# Patient Record
Sex: FEMALE | Race: WHITE
[De-identification: names, ages, dates, MRNs, and addresses within clinical notes are randomized per-mention and may not be internally consistent; named-entity substitution may affect disease eponyms.]

---

## 2020-01-08 ENCOUNTER — HOSPITAL ENCOUNTER (EMERGENCY)
Dept: HOSPITAL 46 - ED | Age: 45
Discharge: HOME | End: 2020-01-08
Payer: MEDICARE

## 2020-01-08 VITALS — WEIGHT: 230.01 LBS | BODY MASS INDEX: 38.32 KG/M2 | HEIGHT: 65 IN

## 2020-01-08 DIAGNOSIS — S29.011A: Primary | ICD-10-CM
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
## 2020-01-08 DIAGNOSIS — Z79.899: ICD-10-CM

## 2020-01-09 NOTE — EKG
[facility]
                                    [address]
                                  [place], [place]  [address]
_________________________________________________________________________________________
                                                                 Signed   
 
 
Normal sinus rhythm
Nonspecific ST and T wave abnormality
Abnormal ECG
No previous ECGs available
Confirmed by DEJUAN LLANES MD ([id number]) on 1/9/2020 7:12:01 AM
 
 
 
 
 
 
 
 
 
 
 
 
 
 
 
 
 
 
 
 
 
 
 
 
 
 
 
 
 
 
 
 
 
 
 
 
 
 
 
 
    Electronically Signed By: DEJUAN LLANES MD  01/09/20 0712
_________________________________________________________________________________________
PATIENT NAME:     RASHI PEREZ                     
MEDICAL RECORD #: [id number]                     Electrocardiogram             
          ACCT #: [id number]  
DATE OF BIRTH:   [birth year]                                       
PHYSICIAN:   DEJUAN LLANES MD                   REPORT #: [id number]
REPORT IS CONFIDENTIAL AND NOT TO BE RELEASED WITHOUT AUTHORIZATION

## 2020-09-28 ENCOUNTER — HOSPITAL ENCOUNTER (EMERGENCY)
Dept: HOSPITAL 46 - ED | Age: 45
Discharge: HOME | End: 2020-09-28
Payer: MEDICARE

## 2020-09-28 VITALS — HEIGHT: 65 IN | BODY MASS INDEX: 38.32 KG/M2 | WEIGHT: 230.01 LBS

## 2020-09-28 DIAGNOSIS — F17.200: ICD-10-CM

## 2020-09-28 DIAGNOSIS — E66.9: ICD-10-CM

## 2020-09-28 DIAGNOSIS — Z79.899: ICD-10-CM

## 2020-09-28 DIAGNOSIS — R00.2: Primary | ICD-10-CM

## 2020-09-28 NOTE — XMS
Encounter Summary
  Created on: 2020
 
 Marie Fermin
 External Reference #: [id number]
 : [birth year]
 Sex: Female
 
 Demographics
 
 
+-----------------------+------------------------+
| Address               | [address]          |
|                       | [place]  [address]      |
+-----------------------+------------------------+
| Home Phone            | [phone]        |
+-----------------------+------------------------+
| Preferred Language    | Unknown                |
+-----------------------+------------------------+
| Marital Status        |                 |
+-----------------------+------------------------+
| Judaism Affiliation | Unknown                |
+-----------------------+------------------------+
| Race                  | White                  |
+-----------------------+------------------------+
| Ethnic Group          | Not  or  |
+-----------------------+------------------------+
 
 
 Author
 
 
+--------------+--------------------------------------------+
| Author       | [facility]  |
|              | and [place]                                |
+--------------+--------------------------------------------+
| Organization | [facility]  |
|              | and [place]                                |
+--------------+--------------------------------------------+
| Address      | Unknown                                    |
+--------------+--------------------------------------------+
| Phone        | Unavailable                                |
+--------------+--------------------------------------------+
 
 
 
 Support
 
 
+-----------------+--------------+---------+-----------------+
| Name            | Relationship | Address | Phone           |
+-----------------+--------------+---------+-----------------+
| Rodríguez Fermin | ECON         | Unknown | [phone] |
+-----------------+--------------+---------+-----------------+
 
 
 
 Care Team Providers
 
 
 
+-----------------------+------+-------------+
| Care Team Member Name | Role | Phone       |
+-----------------------+------+-------------+
| No, Physician         | PCP  | Unavailable |
+-----------------------+------+-------------+
 
 
 
 Reason for Referral
 Evaluate & Treat (Routine)
 
+----------+--------------+-------------+--------------+--------------+---------------+
| Status   | Reason       | Specialty   | Diagnoses /  | Referred By  | Referred To   |
|          |              |             | Procedures   | Contact      | Contact       |
+----------+--------------+-------------+--------------+--------------+---------------+
| Canceled |   Specialty  | Obstetrics  |   Diagnoses  |              |   Celi,   |
|          | Services     | and         |  Uterine     | [facility] | Emmanuel ESPINOSA MD  |
|          | Required     | Gynecology  | leiomyoma,   | , Braxton DURAN MD |  [address]  |
|          |              |             | unspecified  |   [address]  |   [address]     |
|          |              |             | location     | [address]  [place]   | [place]     |
|          |              |             |              | [place]    | [address]    |
|          |              |             |              | [address]        | Phone:        |
|          |              |             |              | Phone:       | [phone]  |
|          |              |             |              | [phone] |  Fax:         |
|          |              |             |              |   Fax:       | [phone]  |
|          |              |             |              | [phone] |               |
+----------+--------------+-------------+--------------+--------------+---------------+
 
 
 
 
 Reason for Visit
 
 
+---------+--------+----------+
| Reason  | Onset  | Comments |
|         | Date   |          |
+---------+--------+----------+
| Results | / |          |
|         |    |          |
+---------+--------+----------+
 
 
 
 Encounter Details
 
 
+--------+-----------+----------------------+----------------------+-------------+
| Date   | Type      | Department           | Care Team            | Description |
+--------+-----------+----------------------+----------------------+-------------+
| / | Telephone |   [facility]   |   Braxton Trujillo | Results     |
|    |           | CARE  [address] |  MD [address]  [address]   |             |
|        |           |   [address] [place]    | [address]  [place] |             |
|        |           | [address]           |  [address]  [phone] |             |
|        |           | [phone]         |   [phone] (Fax) |             |
+--------+-----------+----------------------+----------------------+-------------+
 
 
 
 
 Social History
 
 
+--------------------+-------+-----------+--------+------+
| Tobacco Use        | Types | Packs/Day | Years  | Date |
|                    |       |           | Used   |      |
+--------------------+-------+-----------+--------+------+
| Current Every Day  |       | 1         |        |      |
| Smoker             |       |           |        |      |
+--------------------+-------+-----------+--------+------+
 
 
 
+---------------------+---+---+---+
| Smokeless Tobacco:  |   |   |   |
| Never Used          |   |   |   |
+---------------------+---+---+---+
 
 
 
+-------------+-------------+---------+----------+
| Alcohol Use | Drinks/Week | oz/Week | Comments |
+-------------+-------------+---------+----------+
| Not Asked   |             |         |          |
+-------------+-------------+---------+----------+
 
 
 
+------------------+---------------+
| Sex Assigned at  | Date Recorded |
| Birth            |               |
+------------------+---------------+
| Not on file      |               |
+------------------+---------------+
 documented as of this encounter
 
 Miscellaneous Notes
 Addendum Note - Karina Verdugo Cert MA - 2016 11:12 AM PDT Addended by: KARINA VERDUGO
 on: 2016 11:12
 
   Modules accepted: Orders
 
   Electronically signed by Lakeisha Keith MA at 2016 11:12 AM PDTTelephone Karina Domínguez Cert MA - 2016 10:58 AM PDTPatient called back and was notified o
f note and understood the results and does accept the referral to [facility] and [facility] wait for them to call her.
 Referral will be placed.
 Electronically signed by Lakeisha Keith MA at 2016 11:00 AM PDTTelephone Karina Streeter Cert MA - 2016 10:54 AM PDTCalled patient and left message for her 
to call back.
 Electronically signed by Lakeisha Keith MA at 2016 10:55 AM PDTTelephone Braxton Hansen MD - 2016  8:38 PM PDTNotify Marie,
 Recent MRI of her back reveals deterioration of a disc in the small of her back with associ
ated arthritic changes.  This is the likely source of her discomfort.  An incidental finding
 was a large fibroid tumor on her uterus which may be causing some of her symptoms as well. 
 If she agrees, referral to the women's clinic will be made unless she has an established gy
necologist who she prefers to see elsewhere.
 She should proceed with physical therapy for her back pain.
 ERSElectronically signed by Braxton Trujillo MD at 2016  8:41 PM PDTTelephone Braxton Johnson MD - 2016  8:38 PM PDT----- Message from Gasper Escobar
 [facility] sent at 2016 14:25 PDT -----
 This has not been addressed by Dr. Jeff yet. Please adviseElectronically signed by Braxton salas MD at 2016  8:38 PM PDTdocumented in this encounter
 
 Plan of Treatment
 
 
+----------------------+-------------+--------+----------------------+---------------------+
| Name                 | Type        | Priori | Associated Diagnoses | Order Schedule      |
|                      |             | ty     |                      |                     |
+----------------------+-------------+--------+----------------------+---------------------+
| ** [facility]       | Outpatient  | Routin |   Uterine leiomyoma, | Ordered: 2016 |
| Clinic [facility]      | Referral    | e      |  unspecified         |                     |
| [facility] -  |             |        | location             |                     |
| AMB Referral         |             |        |                      |                     |
+----------------------+-------------+--------+----------------------+---------------------+
 documented as of this encounter
 
 Visit Diagnoses
 
 
+-----------------------------------------------------+
| Diagnosis                                           |
+-----------------------------------------------------+
|   Uterine leiomyoma, unspecified location - Primary |
+-----------------------------------------------------+
 documented in this encounter

## 2020-09-28 NOTE — XMS
Encounter Summary
  Created on: 2020
 
 Marie Fermin
 External Reference #: [id number]
 : [birth year]
 Sex: Female
 
 Demographics
 
 
+-----------------------+------------------------+
| Address               | [address]          |
|                       | [place]  [address]      |
+-----------------------+------------------------+
| Home Phone            | [phone]        |
+-----------------------+------------------------+
| Preferred Language    | Unknown                |
+-----------------------+------------------------+
| Marital Status        |                 |
+-----------------------+------------------------+
| Pentecostalism Affiliation | Unknown                |
+-----------------------+------------------------+
| Race                  | White                  |
+-----------------------+------------------------+
| Ethnic Group          | Not  or  |
+-----------------------+------------------------+
 
 
 Author
 
 
+--------------+--------------------------------------------+
| Author       | [facility]  |
|              | and [place]                                |
+--------------+--------------------------------------------+
| Organization | [facility]  |
|              | and [place]                                |
+--------------+--------------------------------------------+
| Address      | Unknown                                    |
+--------------+--------------------------------------------+
| Phone        | Unavailable                                |
+--------------+--------------------------------------------+
 
 
 
 Support
 
 
+-----------------+--------------+---------+-----------------+
| Name            | Relationship | Address | Phone           |
+-----------------+--------------+---------+-----------------+
| Rodríguez Fermin | ECON         | Unknown | [phone] |
+-----------------+--------------+---------+-----------------+
 
 
 
 Care Team Providers
 
 
 
+-----------------------+------+-------------+
| Care Team Member Name | Role | Phone       |
+-----------------------+------+-------------+
| No, Physician         | PCP  | Unavailable |
+-----------------------+------+-------------+
 
 
 
 Reason for Visit
 
 
+--------------+--------+----------+
| Reason       | Onset  | Comments |
|              | Date   |          |
+--------------+--------+----------+
| Imaging Only | / |          |
|              |    |          |
+--------------+--------+----------+
 
 
 
 Encounter Details
 
 
+--------+-----------+----------------------+---------------------+--------------+
| Date   | Type      | Department           | Care Team           | Description  |
+--------+-----------+----------------------+---------------------+--------------+
| / | Telephone |   [facility]   |   Yary Jeff,  | Imaging Only |
|    |           | CARE  [address] | MD  Need updated    |              |
|        |           |   [place]    | address             |              |
|        |           | [address]           |                     |              |
|        |           | [phone]         |                     |              |
+--------+-----------+----------------------+---------------------+--------------+
 
 
 
 Social History
 
 
+--------------------+-------+-----------+--------+------+
| Tobacco Use        | Types | Packs/Day | Years  | Date |
|                    |       |           | Used   |      |
+--------------------+-------+-----------+--------+------+
| Current Every Day  |       | 1         |        |      |
| Smoker             |       |           |        |      |
+--------------------+-------+-----------+--------+------+
 
 
 
+---------------------+---+---+---+
| Smokeless Tobacco:  |   |   |   |
| Never Used          |   |   |   |
+---------------------+---+---+---+
 
 
 
+-------------+-------------+---------+----------+
| Alcohol Use | Drinks/Week | oz/Week | Comments |
 
+-------------+-------------+---------+----------+
| Not Asked   |             |         |          |
+-------------+-------------+---------+----------+
 
 
 
+------------------+---------------+
| Sex Assigned at  | Date Recorded |
| Birth            |               |
+------------------+---------------+
| Not on file      |               |
+------------------+---------------+
 documented as of this encounter
 
 Miscellaneous Notes
 Telephone Encounter - Karina Enriquez Cert MA - 2016  3:12 PM PDTPatient called back a
nd did  medication and will have imaging next week.
 Electronically signed by Lakeisha Keith MA at 2016  3:13 PM PDTTeleKarina Humphreys Cert MA - 2016  1:42 PM PDTCalled patient and left message for her 
to call back to let us know if she received message about medication ready for .
 Electronically signed by Lakeisha Keith MA at 2016  1:44 PM PDTTelephone Karina Streeter Cert MA - 2016 11:29 AM PDTCALLED PATIENT TO LET HER KNOW THAT WE H
AVE HER SCRIPIT HERE TO  SO SHE CAN COMPLETE HER MRI. CALLED PATIENT AND LEFT MESSAGE
 FOR HER TO CALL BACK.
  DID LEAVE MESSAGE FOR PATIENT THAT HER SCRIPT IS READY FOR .
 Electronically signed by Lakeisha Keith MA at 2016 11:35 AM PDTdocumented in this
 encounter
 
 Plan of Treatment
 Not on filedocumented as of this encounter
 
 Visit Diagnoses
 Not on filedocumented in this encounter

## 2020-09-28 NOTE — XMS
Encounter Summary
  Created on: 2020
 
 Marie Fermin
 External Reference #: [id number]
 : [birth year]
 Sex: Female
 
 Demographics
 
 
+-----------------------+------------------------+
| Address               | [address]          |
|                       | [place]  [address]      |
+-----------------------+------------------------+
| Home Phone            | [phone]        |
+-----------------------+------------------------+
| Preferred Language    | Unknown                |
+-----------------------+------------------------+
| Marital Status        |                 |
+-----------------------+------------------------+
| Yarsani Affiliation | Unknown                |
+-----------------------+------------------------+
| Race                  | White                  |
+-----------------------+------------------------+
| Ethnic Group          | Not  or  |
+-----------------------+------------------------+
 
 
 Author
 
 
+--------------+--------------------------------------------+
| Author       | [facility]  |
|              | and [place]                                |
+--------------+--------------------------------------------+
| Organization | [facility]  |
|              | and [place]                                |
+--------------+--------------------------------------------+
| Address      | Unknown                                    |
+--------------+--------------------------------------------+
| Phone        | Unavailable                                |
+--------------+--------------------------------------------+
 
 
 
 Support
 
 
+-----------------+--------------+---------+-----------------+
| Name            | Relationship | Address | Phone           |
+-----------------+--------------+---------+-----------------+
| Rodríguez Fermin | ECON         | Unknown | [phone] |
+-----------------+--------------+---------+-----------------+
 
 
 
 Care Team Providers
 
 
 
+-----------------------+------+-----------------+
| Care Team Member Name | Role | Phone           |
+-----------------------+------+-----------------+
| ROLDAN Horta NP   | PCP  | [phone] |
+-----------------------+------+-----------------+
 
 
 
 Encounter Details
 
 
+--------+----------+---------------------+----------------------+-------------+
| Date   | Type     | Department          | Care Team            | Description |
+--------+----------+---------------------+----------------------+-------------+
| 10/18/ | Abstract |   [facility]         |   Provider,          |             |
| 2019   |          | PHYSIATRY  [address]    | [address] |             |
|        |          | [address]   |  [address]        |             |
|        |          | [place]     | [place] [address]     |             |
|        |          | [address]          |                      |             |
|        |          | [phone]        |                      |             |
+--------+----------+---------------------+----------------------+-------------+
 
 
 
 Social History
 
 
+--------------------+-------+-----------+--------+------+
| Tobacco Use        | Types | Packs/Day | Years  | Date |
|                    |       |           | Used   |      |
+--------------------+-------+-----------+--------+------+
| Current Every Day  |       | 1         |        |      |
| Smoker             |       |           |        |      |
+--------------------+-------+-----------+--------+------+
 
 
 
+---------------------+---+---+---+
| Smokeless Tobacco:  |   |   |   |
| Never Used          |   |   |   |
+---------------------+---+---+---+
 
 
 
+-------------+-------------+---------+----------+
| Alcohol Use | Drinks/Week | oz/Week | Comments |
+-------------+-------------+---------+----------+
| Not Asked   |             |         |          |
+-------------+-------------+---------+----------+
 
 
 
+------------------+---------------+
| Sex Assigned at  | Date Recorded |
| Birth            |               |
+------------------+---------------+
| Not on file      |               |
+------------------+---------------+
 
 documented as of this encounter
 
 Plan of Treatment
 Not on filedocumented as of this encounter
 
 Visit Diagnoses
 Not on filedocumented in this encounter

## 2020-09-28 NOTE — XMS
Encounter Summary
  Created on: 2020
 
 Marie Fermin
 External Reference #: [id number]
 : [birth year]
 Sex: Female
 
 Demographics
 
 
+-----------------------+------------------------+
| Address               | [address]          |
|                       | [place]  [address]      |
+-----------------------+------------------------+
| Home Phone            | [phone]        |
+-----------------------+------------------------+
| Preferred Language    | Unknown                |
+-----------------------+------------------------+
| Marital Status        |                 |
+-----------------------+------------------------+
| Oriental orthodox Affiliation | Unknown                |
+-----------------------+------------------------+
| Race                  | White                  |
+-----------------------+------------------------+
| Ethnic Group          | Not  or  |
+-----------------------+------------------------+
 
 
 Author
 
 
+--------------+--------------------------------------------+
| Author       | [facility]  |
|              | and [place]                                |
+--------------+--------------------------------------------+
| Organization | [facility]  |
|              | and [place]                                |
+--------------+--------------------------------------------+
| Address      | Unknown                                    |
+--------------+--------------------------------------------+
| Phone        | Unavailable                                |
+--------------+--------------------------------------------+
 
 
 
 Support
 
 
+-----------------+--------------+---------+-----------------+
| Name            | Relationship | Address | Phone           |
+-----------------+--------------+---------+-----------------+
| Rodríguez Fermin | ECON         | Unknown | [phone] |
+-----------------+--------------+---------+-----------------+
 
 
 
 Care Team Providers
 
 
 
+-----------------------+------+-----------------+
| Care Team Member Name | Role | Phone           |
+-----------------------+------+-----------------+
| ROLDAN Horta NP   | PCP  | [phone] |
+-----------------------+------+-----------------+
 
 
 
 Reason for Visit
 
 
+-------------------+----------+
| Reason            | Comments |
+-------------------+----------+
| Medication Refill |          |
+-------------------+----------+
 
 
 
 Encounter Details
 
 
+--------+--------+----------------------+----------------------+-------------------+
| Date   | Type   | Department           | Care Team            | Description       |
+--------+--------+----------------------+----------------------+-------------------+
| / | Refill |   [facility]   |   Segundo Botello    | Medication Refill |
| 2016   |        | CARE  [address] | Godwin Samayoa MD      |                   |
|        |        |   [place]    | [address]       |                   |
|        |        | [address]           | [place]      |                   |
|        |        | [phone]         | [address]  [phone]  |                   |
|        |        |                      |  [phone] (Fax)  |                   |
+--------+--------+----------------------+----------------------+-------------------+
 
 
 
 Social History
 
 
+--------------------+-------+-----------+--------+------+
| Tobacco Use        | Types | Packs/Day | Years  | Date |
|                    |       |           | Used   |      |
+--------------------+-------+-----------+--------+------+
| Current Every Day  |       | 1         |        |      |
| Smoker             |       |           |        |      |
+--------------------+-------+-----------+--------+------+
 
 
 
+---------------------+---+---+---+
| Smokeless Tobacco:  |   |   |   |
| Never Used          |   |   |   |
+---------------------+---+---+---+
 
 
 
+-------------+-------------+---------+----------+
| Alcohol Use | Drinks/Week | oz/Week | Comments |
+-------------+-------------+---------+----------+
 
| Not Asked   |             |         |          |
+-------------+-------------+---------+----------+
 
 
 
+------------------+---------------+
| Sex Assigned at  | Date Recorded |
| Birth            |               |
+------------------+---------------+
| Not on file      |               |
+------------------+---------------+
 documented as of this encounter
 
 Plan of Treatment
 Not on filedocumented as of this encounter
 
 Visit Diagnoses
 Not on filedocumented in this encounter

## 2020-09-28 NOTE — XMS
Encounter Summary
  Created on: 2020
 
 Marie Fermin
 External Reference #: [id number]
 : [birth year]
 Sex: Female
 
 Demographics
 
 
+-----------------------+------------------------+
| Address               | [address]          |
|                       | [place]  [address]      |
+-----------------------+------------------------+
| Home Phone            | [phone]        |
+-----------------------+------------------------+
| Preferred Language    | Unknown                |
+-----------------------+------------------------+
| Marital Status        |                 |
+-----------------------+------------------------+
| Jew Affiliation | Unknown                |
+-----------------------+------------------------+
| Race                  | White                  |
+-----------------------+------------------------+
| Ethnic Group          | Not  or  |
+-----------------------+------------------------+
 
 
 Author
 
 
+--------------+--------------------------------------------+
| Author       | [facility]  |
|              | and [place]                                |
+--------------+--------------------------------------------+
| Organization | [facility]  |
|              | and [place]                                |
+--------------+--------------------------------------------+
| Address      | Unknown                                    |
+--------------+--------------------------------------------+
| Phone        | Unavailable                                |
+--------------+--------------------------------------------+
 
 
 
 Support
 
 
+-----------------+--------------+---------+-----------------+
| Name            | Relationship | Address | Phone           |
+-----------------+--------------+---------+-----------------+
| Rodríguez Fermin | ECON         | Unknown | [phone] |
+-----------------+--------------+---------+-----------------+
 
 
 
 Care Team Providers
 
 
 
+-----------------------+------+-------------+
| Care Team Member Name | Role | Phone       |
+-----------------------+------+-------------+
| No, Physician         | PCP  | Unavailable |
+-----------------------+------+-------------+
 
 
 
 Reason for Visit
 
 
+-------------+--------------------------------+
| Reason      | Comments                       |
+-------------+--------------------------------+
| Pharyngitis | Feels like lump in throat. RM4 |
+-------------+--------------------------------+
 
 
 
 Encounter Details
 
 
+--------+---------+----------------------+----------------------+----------------------+
| Date   | Type    | Department           | Care Team            | Description          |
+--------+---------+----------------------+----------------------+----------------------+
| / | Office  |   [facility]   |   Johnathan Belle MD | Influenza-like       |
|    | Visit   | CARE  [address] |   [address]     | illness (Primary Dx) |
|        |         |   [place]    | [facility] [place]      |                      |
|        |         | [address]           | [address]  [phone]  |                      |
|        |         | [phone]         |  [phone] (Fax)  |                      |
+--------+---------+----------------------+----------------------+----------------------+
 
 
 
 Social History
 
 
+--------------------+-------+-----------+--------+------+
| Tobacco Use        | Types | Packs/Day | Years  | Date |
|                    |       |           | Used   |      |
+--------------------+-------+-----------+--------+------+
| Current Every Day  |       |           |        |      |
| Smoker             |       |           |        |      |
+--------------------+-------+-----------+--------+------+
 
 
 
+---------------------+---+---+---+
| Smokeless Tobacco:  |   |   |   |
| Never Used          |   |   |   |
+---------------------+---+---+---+
 
 
 
+-------------+-------------+---------+----------+
| Alcohol Use | Drinks/Week | oz/Week | Comments |
+-------------+-------------+---------+----------+
| Not Asked   |             |         |          |
 
+-------------+-------------+---------+----------+
 
 
 
+------------------+---------------+
| Sex Assigned at  | Date Recorded |
| Birth            |               |
+------------------+---------------+
| Not on file      |               |
+------------------+---------------+
 documented as of this encounter
 
 Last Filed Vital Signs
 
 
+-------------------+---------------------+----------------------+----------+
| Vital Sign        | Reading             | Time Taken           | Comments |
+-------------------+---------------------+----------------------+----------+
| Blood Pressure    | 108/70              | 2014  9:10 AM  |          |
|                   |                     | PST                  |          |
+-------------------+---------------------+----------------------+----------+
| Pulse             | 100                 | 2014  9:10 AM  |          |
|                   |                     | PST                  |          |
+-------------------+---------------------+----------------------+----------+
| Temperature       | 37.4   C (99.3   F) | 2014  9:10 AM  |          |
|                   |                     | PST                  |          |
+-------------------+---------------------+----------------------+----------+
| Respiratory Rate  | 16                  | 2014  9:10 AM  |          |
|                   |                     | PST                  |          |
+-------------------+---------------------+----------------------+----------+
| Oxygen Saturation | 99%                 | 2014  9:10 AM  |          |
|                   |                     | PST                  |          |
+-------------------+---------------------+----------------------+----------+
| Inhaled Oxygen    | -                   | -                    |          |
| Concentration     |                     |                      |          |
+-------------------+---------------------+----------------------+----------+
| Weight            | 103.9 kg (229 lb)   | 2014  9:10 AM  |          |
|                   |                     | PST                  |          |
+-------------------+---------------------+----------------------+----------+
| Height            | 165.1 cm (5' 5")    | 2014  9:10 AM  |          |
|                   |                     | PST                  |          |
+-------------------+---------------------+----------------------+----------+
| Body Mass Index   | 38.11               | 2014  9:10 AM  |          |
|                   |                     | PST                  |          |
+-------------------+---------------------+----------------------+----------+
 documented in this encounter
 
 Patient Instructions
 Patient Instructions Johnathan Belle MD - 2014 10:09 AM PST Take acetaminophen or i
buprofen for fever and discomfort
  Drink plenty of fluids, warm liquids
  Take decongestant for congestion
 . Antihistamines may be helpful for congestion and sneezing
  Take expectorant or cough
  Use saline nasal drops as needed for nasal congestion
  Use cool mist vaporizer to keep air moist, especially at night.
  If throat is sore, gargle several times a day with warm salt water.  Use frozen cough dr
ops for additional relief
  Avoid smoking and exposure to second-hand smoke
 . Take medications as prescribed 
 
 F/u with office if no improvement or if symptoms worsen 
 Electronically signed by Johnathan Belle MD at 2014  7:04 PM PST
 documented in this encounter
 
 Progress Notes
 Johnathan Belle MD - 2014  9:25 AM PSTFormatting of this note might be different from
 the original.
 Subjective: 
  
 Patient ID: Marie Fermin is a 38 y.o. female.  Past 24 hours has had rapid onset of sore 
throat, pain at the base of her neck anteriorly, and mild exertional dyspnea.  She has some 
headache.  Mild myalgia.  She does have a cough productive of scant clear mucus, with modera
te frequency.  No flu shot this year.
 
 HPI
 Patient's medications, allergies, past medical, surgical, social and family histories were 
reviewed and updated as appropriate.
 
 Review of Systems 
 Constitutional: Positive for fever (subjective), appetite change and fatigue. 
 Respiratory: Positive for cough. Negative for wheezing.  
 Musculoskeletal: Positive for myalgias. 
 All other systems reviewed and are negative.
 
 Objective: 
 Physical Exam 
 Constitutional: She appears well-developed and well-nourished. No distress. 
 HENT: 
 Right Ear: External ear normal. 
 Left Ear: External ear normal. 
 Nose: Mucosal edema and rhinorrhea present. Right sinus exhibits no maxillary sinus tendern
ess and no frontal sinus tenderness. Left sinus exhibits no maxillary sinus tenderness and n
o frontal sinus tenderness. 
 Mouth/Throat: Posterior oropharyngeal erythema present. 
 Cardiovascular: Normal rate and regular rhythm.  
 Pulmonary/Chest: Effort normal and breath sounds normal. 
 Lymphadenopathy: 
   She has no cervical adenopathy. 
 
 Rapid flu test is negative
 Assessment: 
 
 Influenza like illness
 
 Plan: 
 
 She prefers no antiviral medication.  Counseled RE management of respiratory symptoms of co
ugh and no specific Rx is given.
 
 Electronically signed by Johnathan Belle MD at 2014  7:05 PM PSTdocumented in this enc
ounter
 
 Plan of Treatment
 Not on filedocumented as of this encounter
 
 Procedures
 
 
+--------------------+--------+------------+----------------------+----------------------+
| Procedure Name     | Priori | Date/Time  | Associated Diagnosis | Comments             |
 
|                    | ty     |            |                      |                      |
+--------------------+--------+------------+----------------------+----------------------+
| POCT INFLUENZA A/B | Routin | 2014 |   Influenza-like     |   Results for this   |
|                    | e      |            | illness              | procedure are in the |
|                    |        |            |                      |  results section.    |
+--------------------+--------+------------+----------------------+----------------------+
 documented in this encounter
 
 Results
 POCT Influenza A/B (2014)
 
+-------------+------------+-----------+------------+--------------+
| Component   | Value      | Ref Range | Performed  | Pathologist  |
|             |            |           | At         | Signature    |
+-------------+------------+-----------+------------+--------------+
| Rapid       | Negative   | Negative  |            |              |
| Influenza A |            |           |            |              |
|  Ag         |            |           |            |              |
+-------------+------------+-----------+------------+--------------+
| Rapid       | Negative   | Negative  |            |              |
| Influenza B |            |           |            |              |
|  Ag         |            |           |            |              |
+-------------+------------+-----------+------------+--------------+
| Internal QC | Acceptable |           |            |              |
+-------------+------------+-----------+------------+--------------+
 
 
 
+----------+
| Specimen |
+----------+
|          |
+----------+
 documented in this encounter
 
 Visit Diagnoses
 
 
+-------------------------------------------------------------------------------------+
| Diagnosis                                                                           |
+-------------------------------------------------------------------------------------+
|   Influenza-like illness - Primary  Influenza with other respiratory manifestations |
+-------------------------------------------------------------------------------------+
 documented in this encounter

## 2020-09-28 NOTE — XMS
Encounter Summary
  Created on: 2020
 
 Rashi Fermin
 External Reference #: [id number]
 : [birth year]
 Sex: Female
 
 Demographics
 
 
+-----------------------+------------------------+
| Address               | [address]          |
|                       | [place]  [address]      |
+-----------------------+------------------------+
| Home Phone            | [phone]        |
+-----------------------+------------------------+
| Preferred Language    | Unknown                |
+-----------------------+------------------------+
| Marital Status        |                 |
+-----------------------+------------------------+
| Buddhism Affiliation | Unknown                |
+-----------------------+------------------------+
| Race                  | White                  |
+-----------------------+------------------------+
| Ethnic Group          | Not  or  |
+-----------------------+------------------------+
 
 
 Author
 
 
+--------------+--------------------------------------------+
| Author       | [facility]  |
|              | and [place]                                |
+--------------+--------------------------------------------+
| Organization | [facility]  |
|              | and [place]                                |
+--------------+--------------------------------------------+
| Address      | Unknown                                    |
+--------------+--------------------------------------------+
| Phone        | Unavailable                                |
+--------------+--------------------------------------------+
 
 
 
 Support
 
 
+-----------------+--------------+---------+-----------------+
| Name            | Relationship | Address | Phone           |
+-----------------+--------------+---------+-----------------+
| Rodríguez Fermin | ECON         | Unknown | [phone] |
+-----------------+--------------+---------+-----------------+
 
 
 
 Care Team Providers
 
 
 
+-----------------------+------+-------------+
| Care Team Member Name | Role | Phone       |
+-----------------------+------+-------------+
| No, Physician         | PCP  | Unavailable |
+-----------------------+------+-------------+
 
 
 
 Encounter Details
 
 
+--------+-----------+---------------------+--------------------+-------------+
| Date   | Type      | Department          | Care Team          | Description |
+--------+-----------+---------------------+--------------------+-------------+
| 08/15/ | Hospital  |   [facility]    |   Conversion       |             |
| 2016   | Encounter | [facility]     | Transaction,       |             |
|        |           | CLINIC  [address]   | Provider Unknown   |             |
|        |           | [place]  [place], [place]  |        |             |
|        |           | [phone]          |  (Fax) |             |
|        |           | [phone]        |                    |             |
+--------+-----------+---------------------+--------------------+-------------+
 
 
 
 Social History
 
 
+--------------------+-------+-----------+--------+------+
| Tobacco Use        | Types | Packs/Day | Years  | Date |
|                    |       |           | Used   |      |
+--------------------+-------+-----------+--------+------+
| Current Every Day  |       | 1         |        |      |
| Smoker             |       |           |        |      |
+--------------------+-------+-----------+--------+------+
 
 
 
+---------------------+---+---+---+
| Smokeless Tobacco:  |   |   |   |
| Never Used          |   |   |   |
+---------------------+---+---+---+
 
 
 
+-------------+-------------+---------+----------+
| Alcohol Use | Drinks/Week | oz/Week | Comments |
+-------------+-------------+---------+----------+
| Not Asked   |             |         |          |
+-------------+-------------+---------+----------+
 
 
 
+------------------+---------------+
| Sex Assigned at  | Date Recorded |
| Birth            |               |
+------------------+---------------+
| Not on file      |               |
+------------------+---------------+
 
 documented as of this encounter
 
 Last Filed Vital Signs
 
 
+-------------------+----------------------+----------------------+----------+
| Vital Sign        | Reading              | Time Taken           | Comments |
+-------------------+----------------------+----------------------+----------+
| Blood Pressure    | -                    | -                    |          |
+-------------------+----------------------+----------------------+----------+
| Pulse             | 80                   | 08/15/2016  2:57 PM  |          |
|                   |                      | PDT                  |          |
+-------------------+----------------------+----------------------+----------+
| Temperature       | -                    | -                    |          |
+-------------------+----------------------+----------------------+----------+
| Respiratory Rate  | -                    | -                    |          |
+-------------------+----------------------+----------------------+----------+
| Oxygen Saturation | -                    | -                    |          |
+-------------------+----------------------+----------------------+----------+
| Inhaled Oxygen    | -                    | -                    |          |
| Concentration     |                      |                      |          |
+-------------------+----------------------+----------------------+----------+
| Weight            | 104.6 kg (230 lb 9.6 | 08/15/2016  2:57 PM  |          |
|                   |  oz)                 | PDT                  |          |
+-------------------+----------------------+----------------------+----------+
| Height            | 166.4 cm (5' 5.5")   | 08/15/2016  2:57 PM  |          |
|                   |                      | PDT                  |          |
+-------------------+----------------------+----------------------+----------+
| Body Mass Index   | 37.79                | 08/15/2016  2:57 PM  |          |
|                   |                      | PDT                  |          |
+-------------------+----------------------+----------------------+----------+
 documented in this encounter
 
 Medications at Time of Discharge
 
 
+----------------------+----------------------+-----------+---------+----------+-----------+
| Medication           | Sig                  | Dispensed | Refills | Start    | End Date  |
|                      |                      |           |         | Date     |           |
+----------------------+----------------------+-----------+---------+----------+-----------+
|   ibuprofen (ADVIL,  | Take 400 mg by mouth |           | 0       |          |           |
| MOTRIN) 200 mg       |  every 6 hours as    |           |         |          |           |
| tablet               | needed for Pain.     |           |         |          |           |
+----------------------+----------------------+-----------+---------+----------+-----------+
|   albuterol 2.5 mg/3 | Use one vial in your |   60 vial | 0       | 20 | 10/08/201 |
|  mL nebulizer        |  nebulizer every 4   |           |         | 15       | 6         |
| solutionIndications: | hours as needed for  |           |         |          |           |
|  Asthma, mild        | wheezing             |           |         |          |           |
| intermittent,        |                      |           |         |          |           |
| uncomplicated        |                      |           |         |          |           |
+----------------------+----------------------+-----------+---------+----------+-----------+
|   diazepam (VALIUM)  | Take 1 tablet by     |   1       | 0       | 20 | 10/08/201 |
| 10 MG tablet         | mouth as needed (1   | tablet    |         | 16       | 6         |
|                      | hour before MRI) for |           |         |          |           |
|                      |  up to 1 dose.       |           |         |          |           |
+----------------------+----------------------+-----------+---------+----------+-----------+
|   meloxicam (MOBIC)  | Take 1 tablet by     |   30      | 0       | 20 | 10/08/201 |
| 15 mg                | mouth Daily as       | tablet    |         | 16       | 6         |
| tabletIndications:   | needed for Pain.     |           |         |          |           |
| Right-sided low back |                      |           |         |          |           |
 
|  pain with           |                      |           |         |          |           |
| right-sided          |                      |           |         |          |           |
| sciatica,            |                      |           |         |          |           |
| unspecified          |                      |           |         |          |           |
| chronicity           |                      |           |         |          |           |
+----------------------+----------------------+-----------+---------+----------+-----------+
|   methylPREDNISolone | follow package       |   21      | 0       | 20 | 10/08/201 |
|  (MEDROL DOSEPAK) 4  | directions           | tablet    |         | 16       | 6         |
| mg tablet            |                      |           |         |          |           |
+----------------------+----------------------+-----------+---------+----------+-----------+
|                      | Take  by mouth.      |           | 0       |          | 10/08/201 |
| Hxmmryexd-BND-MD-APA |                      |           |         |          | 6         |
| P (DIMETAPP          |                      |           |         |          |           |
| MULTISYMPTOM         |                      |           |         |          |           |
| COLD/FLU PO)         |                      |           |         |          |           |
+----------------------+----------------------+-----------+---------+----------+-----------+
|   PROAIR  (90 | inhale 2 puffs by    |   8.5 g   | 3       | 20 | 10/08/201 |
|  BASE) MCG/ACT       | mouth every 4 hours  |           |         | 16       | 6         |
| inhaler              | if needed for        |           |         |          |           |
|                      | wheezing             |           |         |          |           |
+----------------------+----------------------+-----------+---------+----------+-----------+
|   traMADol (ULTRAM)  | One tablet orally    |   30      | 0       | 20 | 10/08/201 |
| 50 mg                | every 4 hours as     | tablet    |         | 16       | 6         |
| tabletIndications:   | needed for pain      |           |         |          |           |
| Low back pain,       |                      |           |         |          |           |
| unspecified back     |                      |           |         |          |           |
| pain laterality,     |                      |           |         |          |           |
| unspecified          |                      |           |         |          |           |
| chronicity, with     |                      |           |         |          |           |
| sciatica presence    |                      |           |         |          |           |
| unspecified          |                      |           |         |          |           |
+----------------------+----------------------+-----------+---------+----------+-----------+
 documented as of this encounter
 
 Plan of Treatment
 Not on filedocumented as of this encounter
 
 Procedures
 
 
+--------------------+--------+-------------+----------------------+----------------------+
| Procedure Name     | Priori | Date/Time   | Associated Diagnosis | Comments             |
|                    | ty     |             |                      |                      |
+--------------------+--------+-------------+----------------------+----------------------+
| URINALYSIS WITH    | Routin | 08/15/2016  |                      |   Results for this   |
| MICROSCOPIC IF     | e      |  3:58 PM    |                      | procedure are in the |
| INDICATED          |        | PDT         |                      |  results section.    |
+--------------------+--------+-------------+----------------------+----------------------+
| XR CHEST 2 VIEWS   | Routin | 08/15/2016  |                      |   Results for this   |
|                    | e      |  3:40 PM    |                      | procedure are in the |
|                    |        | PDT         |                      |  results section.    |
+--------------------+--------+-------------+----------------------+----------------------+
| TYPE AND SCREEN    | Routin | 08/15/2016  |                      |   Results for this   |
|                    | e      |  3:14 PM    |                      | procedure are in the |
|                    |        | PDT         |                      |  results section.    |
+--------------------+--------+-------------+----------------------+----------------------+
| EXTERNAL LAB: CBC  | Routin | 08/15/2016  |                      |   Results for this   |
|                    | e      |  3:12 PM    |                      | procedure are in the |
|                    |        | PDT         |                      |  results section.    |
+--------------------+--------+-------------+----------------------+----------------------+
 
| PREGNANCY, SERUM,  | Routin | 08/15/2016  |                      |   Results for this   |
| QUAL               | e      |  3:12 PM    |                      | procedure are in the |
|                    |        | PDT         |                      |  results section.    |
+--------------------+--------+-------------+----------------------+----------------------+
 documented in this encounter
 
 Results
 Urinalysis with Microscopic if Indicated (08/15/2016  3:58 PM PDT)
 
+-------------+--------------------------+---------------+------------+--------------+
| Component   | Value                    | Ref Range     | Performed  | Pathologist  |
|             |                          |               | At         | Signature    |
+-------------+--------------------------+---------------+------------+--------------+
| Color       | YELLOWComment: Testing   |               | EXTERNAL   |              |
|             | performed at [facility], [address] |               | LAB        |              |
|             |  [address],        |               |            |              |
|             | [place]  [id number]     |               |            |              |
+-------------+--------------------------+---------------+------------+--------------+
| Clarity,    | CLEARComment: Testing    |               | EXTERNAL   |              |
| Urine       | performed at [facility], [address] |               | LAB        |              |
|             |  [address],        |               |            |              |
|             | [place]  [id number]     |               |            |              |
+-------------+--------------------------+---------------+------------+--------------+
| Specific    | 1.009Comment: Testing    | 1.002 - 1.030 | EXTERNAL   |              |
| [place],    | performed at [facility], [address] |               | LAB        |              |
| Urine       |  [address],        |               |            |              |
|             | [place]  [id number]     |               |            |              |
+-------------+--------------------------+---------------+------------+--------------+
| Leukocyte   | NEGATIVEComment:         |               | EXTERNAL   |              |
| Esterase,   | Testing performed at     |               | LAB        |              |
| Urine       | [facility], [address]   |               |            |              |
|             | [place]      |               |            |              |
|             |   [id number]                  |               |            |              |
+-------------+--------------------------+---------------+------------+--------------+
| Nitrite,    | NEGATIVEComment: Testing |               | EXTERNAL   |              |
| Urine       |  performed at [facility], [address]  |               | LAB        |              |
|             | [address],       |               |            |              |
|             | [place]  [id number]     |               |            |              |
+-------------+--------------------------+---------------+------------+--------------+
| Urobilinoge | 0.2Comment: Testing      | mg/dL         | EXTERNAL   |              |
| n, Urine    | performed at [facility], [address] |               | LAB        |              |
|             |  [address],        |               |            |              |
|             | [place]  [id number]     |               |            |              |
+-------------+--------------------------+---------------+------------+--------------+
| Protein,    | NEGATIVEComment: Testing | mg/dL         | EXTERNAL   |              |
| Urine       |  performed at [facility], [address]  |               | LAB        |              |
|             | [address],       |               |            |              |
|             | [place]  [address]     |               |            |              |
+-------------+--------------------------+---------------+------------+--------------+
| pH, Urine   | 6.0Comment: Testing      | 5.0 - 8.0     | EXTERNAL   |              |
|             | performed at [facility], [address] |               | LAB        |              |
|             |  [address],        |               |            |              |
|             | [place]  [id number]     |               |            |              |
+-------------+--------------------------+---------------+------------+--------------+
| Blood,      | NEGATIVEComment: Testing |               | EXTERNAL   |              |
| Urine       |  performed at [facility], [address]  |               | LAB        |              |
|             | [address],       |               |            |              |
|             | [place]  [address]     |               |            |              |
+-------------+--------------------------+---------------+------------+--------------+
| Ketones     | NEGATIVEComment: Testing | mg/dL         | EXTERNAL   |              |
 
|             |  performed at [facility], [address]  |               | LAB        |              |
|             | [address],       |               |            |              |
|             | [place]  [id number]     |               |            |              |
+-------------+--------------------------+---------------+------------+--------------+
| Bilirubin,  | NEGATIVEComment: Testing |               | EXTERNAL   |              |
| Urine       |  performed at [facility], [address]  |               | LAB        |              |
|             | [address],       |               |            |              |
|             | [place]  [address]     |               |            |              |
+-------------+--------------------------+---------------+------------+--------------+
| Glucose,    | NEGATIVEComment: Testing | mg/dL         | EXTERNAL   |              |
| Urine       |  performed at [facility], [address]  |               | LAB        |              |
|             | [address],       |               |            |              |
|             | [place]  [address]     |               |            |              |
+-------------+--------------------------+---------------+------------+--------------+
 
 
 
+-----------------+
| Specimen        |
+-----------------+
| Urine specimen  |
| (specimen)      |
+-----------------+
 
 
 
+----------------+---------+--------------------+--------------+
| Performing     | Address | City/State/Zipcode | Phone Number |
| Organization   |         |                    |              |
+----------------+---------+--------------------+--------------+
|   EXTERNAL LAB |         |                    |              |
+----------------+---------+--------------------+--------------+
 XR Chest 2 Vws (08/15/2016  3:40 PM PDT)
 
+----------+
| Specimen |
+----------+
|          |
+----------+
 
 
 
+--------------------------------------------------------------------+--------------+
| Impressions                                                        | Performed At |
+--------------------------------------------------------------------+--------------+
|   1.   No acute cardiopulmonary process noted.     Electronically  |              |
| signed by Lon Montez MD on 8/15/2016 3:54 PM                      |              |
+--------------------------------------------------------------------+--------------+
 
 
 
+----------------------------------------------------------------------+--------------+
| Narrative                                                            | Performed At |
+----------------------------------------------------------------------+--------------+
|   RASHI FERMIN  1975  41 years  XR CHEST 2 VIEW FRONTAL AND   |              |
| LATERAL  8/15/2016 3:40 PM     INDICATION: Preadmission. History of  |              |
| smoking.     TECHNIQUE: 2 views     COMPARISON: None     FINDINGS:   |              |
|   Lungs appear clear. Cardiomediastinal silhouette appears           |              |
| unremarkable. Osseous structures appear unremarkable. No pleural     |              |
| effusion seen.                                                       |              |
 
+----------------------------------------------------------------------+--------------+
 
 
 
+-------------------------------------------------------------------------------------------
---------------------------------------------------------+
| Procedure Note                                                                            
                                                         |
+-------------------------------------------------------------------------------------------
---------------------------------------------------------+
|   Charlie Briceño Conversion - 08/15/2019  7:12 PM PDT  RASHI FERMIN5[birth year][id number][birth year][id number] yearsXR CHEST  
                                                         |
|  2 VIEW FRONTAL AND LATERAL8/15/2016 3:40 PM INDICATION: Preadmission. History of         
                                                         |
| smoking. TECHNIQUE: 2 views COMPARISON: None FINDINGS:  Lungs appear clear.               
                                                         |
| Cardiomediastinal silhouette appears unremarkable. Osseous structures appear              
                                                         |
| unremarkable. No pleural effusion seen. IMPRESSION: 1.  No acute cardiopulmonary process  
                                                         |
|  noted. Electronically signed by Lon Montez MD on 8/15/2016 3:54 PM                      
                                                         |
|INDICATION: Preadmission. History of smoking.                                              
                                                         |
|TECHNIQUE: 2 views                                                                         
                                                         |
|COMPARISON: None                                                                           
                                                         |
|FINDINGS:  Lungs appear clear. Cardiomediastinal silhouette appears unremarkable. Osseous s
tructures appear unremarkable. No pleural effusion seen. |
|                                                                                           
                                                         |
|IMPRESSION:                                                                                
                                                         |
|1.  No acute cardiopulmonary process noted.                                                
                                                         |
|Electronically signed by Lon Montez MD on 8/15/2016 3:54 PM                               
                                                         |
+-------------------------------------------------------------------------------------------
---------------------------------------------------------+
 Type and Screen (08/15/2016  3:14 PM PDT)
 
+-----------+------------------------+-----------+------------+--------------+
| Component | Value                  | Ref Range | Performed  | Pathologist  |
|           |                        |           | At         | Signature    |
+-----------+------------------------+-----------+------------+--------------+
| ABO Rh    | O POSITIVE             |           | EXTERNAL   |              |
|           |                        |           | LAB        |              |
+-----------+------------------------+-----------+------------+--------------+
| ABO Rh    | Testing performed at   |           | EXTERNAL   |              |
|           | [facility];[address]          |           | LAB        |              |
|           | Blvd;[place] [address] |           |            |              |
 
+-----------+------------------------+-----------+------------+--------------+
| Antibody  | NEGATIVE               |           | EXTERNAL   |              |
| Screen    |                        |           | LAB        |              |
+-----------+------------------------+-----------+------------+--------------+
| Antibody  | Testing performed at   |           | EXTERNAL   |              |
| Screen    | [facility];[address]          |           | LAB        |              |
|           | Blvd;[place] [address] |           |            |              |
+-----------+------------------------+-----------+------------+--------------+
 
 
 
+-----------------+
| Specimen        |
+-----------------+
| Blood specimen  |
| (specimen)      |
+-----------------+
 
 
 
+----------------+---------+--------------------+--------------+
| Performing     | Address | City/State/Zipcode | Phone Number |
| Organization   |         |                    |              |
+----------------+---------+--------------------+--------------+
|   EXTERNAL LAB |         |                    |              |
+----------------+---------+--------------------+--------------+
 External Lab: CBC (08/15/2016  3:12 PM PDT)
 
+-------------+--------------------------+-----------------+------------+--------------+
| Component   | Value                    | Ref Range       | Performed  | Pathologist  |
|             |                          |                 | At         | Signature    |
+-------------+--------------------------+-----------------+------------+--------------+
| WBC         | 8.81Comment: Testing     | 3.80 - 11.00    | EXTERNAL   |              |
|             | performed at [facility], [address] | K/uL            | LAB        |              |
|             |  [address],        |                 |            |              |
|             | [place]  [address]     |                 |            |              |
+-------------+--------------------------+-----------------+------------+--------------+
| Non-Fetal   | 4.76Comment: Testing     | 3.70 - 5.10     | EXTERNAL   |              |
| Red Blood   | performed at [facility], [address] | M/uL            | LAB        |              |
| Cells       |  [address],        |                 |            |              |
| Counted     | [place]  [id number]     |                 |            |              |
+-------------+--------------------------+-----------------+------------+--------------+
| Hemoglobin  | 14.3Comment: Testing     | 11.3 - 15.5     | EXTERNAL   |              |
|             | performed at [facility], [address] | g/dL            | LAB        |              |
|             |  [address],        |                 |            |              |
|             | [place]  [address]     |                 |            |              |
+-------------+--------------------------+-----------------+------------+--------------+
| Hematocrit, | 42.8Comment: Testing     | 34.0 - 46.0 %   | EXTERNAL   |              |
|  POC        | performed at [facility], [address] |                 | LAB        |              |
|             |  [address],        |                 |            |              |
|             | [place]  [address]     |                 |            |              |
+-------------+--------------------------+-----------------+------------+--------------+
| MCV         | 89.9Comment: Testing     | 80.0 - 100.0 fl | EXTERNAL   |              |
|             | performed at [facility], [address] |                 | LAB        |              |
|             |  [address],        |                 |            |              |
|             | [place]  [address]     |                 |            |              |
+-------------+--------------------------+-----------------+------------+--------------+
| MCH         | 30.0Comment: Testing     | 27.0 - 34.0 pg  | EXTERNAL   |              |
|             | performed at [facility], [address] |                 | LAB        |              |
|             |  [address],        |                 |            |              |
 
|             | [place]  [address]     |                 |            |              |
+-------------+--------------------------+-----------------+------------+--------------+
| MCHC        | 33.3Comment: Testing     | 32.0 - 35.5     | EXTERNAL   |              |
|             | performed at [facility], [address] | g/dL            | LAB        |              |
|             |  [address],        |                 |            |              |
|             | [place]  [address]     |                 |            |              |
+-------------+--------------------------+-----------------+------------+--------------+
| RDW-CV      | 41.1Comment: Testing     | 37 - 53 fl      | EXTERNAL   |              |
|             | performed at [facility], [address] |                 | LAB        |              |
|             |  [address],        |                 |            |              |
|             | [place]  [address]     |                 |            |              |
+-------------+--------------------------+-----------------+------------+--------------+
| Platelet    | 280Comment: Testing      | 150 - 400 K/uL  | EXTERNAL   |              |
| Count       | performed at [facility], [address] |                 | LAB        |              |
| Plasma      |  [address],        |                 |            |              |
|             | [place]  [address]     |                 |            |              |
+-------------+--------------------------+-----------------+------------+--------------+
| MPV         | 8.3Comment: Testing      | fl              | EXTERNAL   |              |
|             | performed at [facility], [address] |                 | LAB        |              |
|             |  [address],        |                 |            |              |
|             | [place]  [address]     |                 |            |              |
+-------------+--------------------------+-----------------+------------+--------------+
| Differentia | AUTOMATEDComment:        |                 | EXTERNAL   |              |
| l Type      | Testing performed at     |                 | LAB        |              |
|             | [facility], [address] [facility]   |                 |            |              |
|             | [place]      |                 |            |              |
|             | [address]                    |                 |            |              |
+-------------+--------------------------+-----------------+------------+--------------+
| % Segmented | 60.11Comment: Testing    | %               | EXTERNAL   |              |
|             | performed at [facility], [address] |                 | LAB        |              |
| Neutrophils |  [place] [place],        |                 |            |              |
|             | [place]  [address]     |                 |            |              |
+-------------+--------------------------+-----------------+------------+--------------+
| %           | 30.55Comment: Testing    | %               | EXTERNAL   |              |
| Lymphocytes | performed at [facility], [address] |                 | LAB        |              |
|             |  [address],        |                 |            |              |
|             | [place]  [address]     |                 |            |              |
+-------------+--------------------------+-----------------+------------+--------------+
| % Monocytes | 7.96Comment: Testing     | %               | EXTERNAL   |              |
|             | performed at [facility], [address] |                 | LAB        |              |
|             |  [address],        |                 |            |              |
|             | [place]  [address]     |                 |            |              |
+-------------+--------------------------+-----------------+------------+--------------+
| %           | 0.99Comment: Testing     | %               | EXTERNAL   |              |
| Eosinophils | performed at [facility], [address] |                 | LAB        |              |
|             |  [address],        |                 |            |              |
|             | [place]  [address]     |                 |            |              |
+-------------+--------------------------+-----------------+------------+--------------+
| % Basophils | 0.39Comment: Testing     | %               | EXTERNAL   |              |
|             | performed at [facility], [address] |                 | LAB        |              |
|             |  [address],        |                 |            |              |
|             | [place]  [address]     |                 |            |              |
+-------------+--------------------------+-----------------+------------+--------------+
| Absolute    | 5.30Comment: Testing     | 1.90 - 7.40     | EXTERNAL   |              |
| Segmented   | performed at [facility], [address] | K/uL            | LAB        |              |
| Neutrophils |  [address],        |                 |            |              |
|             | [place]  [address]     |                 |            |              |
+-------------+--------------------------+-----------------+------------+--------------+
| Absolute    | 2.69Comment: Testing     | 1.00 - 3.90     | EXTERNAL   |              |
| Lymphocytes | performed at [facility], [address] | K/uL            | LAB        |              |
 
|             |  [address],        |                 |            |              |
|             | [place]  [address]     |                 |            |              |
+-------------+--------------------------+-----------------+------------+--------------+
| Absolute    | 0.70Comment: Testing     | 0.00 - 0.80     | EXTERNAL   |              |
| Monocytes   | performed at [facility], [address] | K/uL            | LAB        |              |
|             |  [address],        |                 |            |              |
|             | [place]  [address]     |                 |            |              |
+-------------+--------------------------+-----------------+------------+--------------+
| Absolute    | 0.09Comment: Testing     | 0.00 - 0.50     | EXTERNAL   |              |
| Eosinophils | performed at [facility], [address] | K/uL            | LAB        |              |
|             |  [address],        |                 |            |              |
|             | [place]  [address]     |                 |            |              |
+-------------+--------------------------+-----------------+------------+--------------+
| Absolute    | 0.03Comment: Testing     | 0.00 - 0.10     | EXTERNAL   |              |
| Basophils   | performed at [facility], [address] | K/uL            | LAB        |              |
|             |  [address],        |                 |            |              |
|             | [place]  [address]     |                 |            |              |
+-------------+--------------------------+-----------------+------------+--------------+
 
 
 
+-----------------+
| Specimen        |
+-----------------+
| Blood specimen  |
| (specimen)      |
+-----------------+
 
 
 
+----------------+---------+--------------------+--------------+
| Performing     | Address | City/State/Zipcode | Phone Number |
| Organization   |         |                    |              |
+----------------+---------+--------------------+--------------+
|   EXTERNAL LAB |         |                    |              |
+----------------+---------+--------------------+--------------+
 Pregnancy, Serum, Qual (08/15/2016  3:12 PM PDT)
 
+-------------+--------------------------+-----------+------------+--------------+
| Component   | Value                    | Ref Range | Performed  | Pathologist  |
|             |                          |           | At         | Signature    |
+-------------+--------------------------+-----------+------------+--------------+
| HCG         | NEGATIVEComment: Testing |           | EXTERNAL   |              |
| QUALITATIVE |  performed at [facility], [address]  |           | LAB        |              |
|             | [address],       |           |            |              |
|             | [place]  [address]     |           |            |              |
+-------------+--------------------------+-----------+------------+--------------+
 
 
 
+-----------------+
| Specimen        |
+-----------------+
| Blood specimen  |
| (specimen)      |
+-----------------+
 
 
 
+----------------+---------+--------------------+--------------+
 
| Performing     | Address | City/State/Zipcode | Phone Number |
| Organization   |         |                    |              |
+----------------+---------+--------------------+--------------+
|   EXTERNAL LAB |         |                    |              |
+----------------+---------+--------------------+--------------+
 documented in this encounter
 
 Visit Diagnoses
 Not on filedocumented in this encounter

## 2020-09-28 NOTE — XMS
Encounter Summary
  Created on: 2020
 
 Marie Fermin
 External Reference #: [id number]
 : [birth year]
 Sex: Female
 
 Demographics
 
 
+-----------------------+------------------------+
| Address               | [address]          |
|                       | [place]  [address]      |
+-----------------------+------------------------+
| Home Phone            | [phone]        |
+-----------------------+------------------------+
| Preferred Language    | Unknown                |
+-----------------------+------------------------+
| Marital Status        |                 |
+-----------------------+------------------------+
| Gnosticism Affiliation | Unknown                |
+-----------------------+------------------------+
| Race                  | White                  |
+-----------------------+------------------------+
| Ethnic Group          | Not  or  |
+-----------------------+------------------------+
 
 
 Author
 
 
+--------------+--------------------------------------------+
| Author       | [facility]  |
|              | and [place]                                |
+--------------+--------------------------------------------+
| Organization | [facility]  |
|              | and [place]                                |
+--------------+--------------------------------------------+
| Address      | Unknown                                    |
+--------------+--------------------------------------------+
| Phone        | Unavailable                                |
+--------------+--------------------------------------------+
 
 
 
 Support
 
 
+-----------------+--------------+---------+-----------------+
| Name            | Relationship | Address | Phone           |
+-----------------+--------------+---------+-----------------+
| Rodríguez Fermin | ECON         | Unknown | [phone] |
+-----------------+--------------+---------+-----------------+
 
 
 
 Care Team Providers
 
 
 
+-----------------------+------+-------------+
| Care Team Member Name | Role | Phone       |
+-----------------------+------+-------------+
 PCP  | Unavailable |
+-----------------------+------+-------------+
 
 
 
 Encounter Details
 
 
+--------+-----------+----------------------+-----------+-------------+
| Date   | Type      | Department           | Care Team | Description |
+--------+-----------+----------------------+-----------+-------------+
| / | Hospital  |   [facility] |           |             |
|    | Encounter |  [facility] EMER[facility]Y   |           |             |
|        |           | [address] |           |             |
|        |           |   [place]    |           |             |
|        |           | [id number]           |           |             |
|        |           | [phone]         |           |             |
+--------+-----------+----------------------+-----------+-------------+
 
 
 
 Social History
 
 
+----------------+-------+-----------+--------+------+
| Tobacco Use    | Types | Packs/Day | Years  | Date |
|                |       |           | Used   |      |
+----------------+-------+-----------+--------+------+
| Never Assessed |       |           |        |      |
+----------------+-------+-----------+--------+------+
 
 
 
+------------------+---------------+
| Sex Assigned at  | Date Recorded |
| Birth            |               |
+------------------+---------------+
| Not on file      |               |
+------------------+---------------+
 documented as of this encounter
 
 Plan of Treatment
 Not on filedocumented as of this encounter
 
 Visit Diagnoses
 Not on filedocumented in this encounter

## 2020-09-28 NOTE — XMS
Encounter Summary
  Created on: 2020
 
 Marie Fermin
 External Reference #: [id number]
 : [birth year]
 Sex: Female
 
 Demographics
 
 
+-----------------------+------------------------+
| Address               | [address]          |
|                       | [place]  [address]      |
+-----------------------+------------------------+
| Home Phone            | [phone]        |
+-----------------------+------------------------+
| Preferred Language    | Unknown                |
+-----------------------+------------------------+
| Marital Status        |                 |
+-----------------------+------------------------+
| Restorationism Affiliation | Unknown                |
+-----------------------+------------------------+
| Race                  | White                  |
+-----------------------+------------------------+
| Ethnic Group          | Not  or  |
+-----------------------+------------------------+
 
 
 Author
 
 
+--------------+--------------------------------------------+
| Author       | [facility]  |
|              | and [place]                                |
+--------------+--------------------------------------------+
| Organization | [facility]  |
|              | and [place]                                |
+--------------+--------------------------------------------+
| Address      | Unknown                                    |
+--------------+--------------------------------------------+
| Phone        | Unavailable                                |
+--------------+--------------------------------------------+
 
 
 
 Support
 
 
+-----------------+--------------+---------+-----------------+
| Name            | Relationship | Address | Phone           |
+-----------------+--------------+---------+-----------------+
| Rodríguez Fermin | ECON         | Unknown | [phone] |
+-----------------+--------------+---------+-----------------+
 
 
 
 Care Team Providers
 
 
 
+-----------------------+------+-------------+
| Care Team Member Name | Role | Phone       |
+-----------------------+------+-------------+
 PCP  | Unavailable |
+-----------------------+------+-------------+
 
 
 
 Encounter Details
 
 
+--------+-----------+----------------------+----------------------+-------------+
| Date   | Type      | Department           | Care Team            | Description |
+--------+-----------+----------------------+----------------------+-------------+
| / | [facility]  |   [facility] |   [place],       |             |
|    | Encounter |  [facility] EMERGENCY   | Geraldo VILLEGAS MD  [address]  |             |
|        |           | CENTER  [address] | [facility]     |             |
|        |           |   [place]    | [place] [address] |             |
|        |           | [address]           |   [phone]       |             |
|        |           | [phone]         | [phone] (Fax)   |             |
+--------+-----------+----------------------+----------------------+-------------+
 
 
 
 Social History
 
 
+----------------+-------+-----------+--------+------+
| Tobacco Use    | Types | Packs/Day | Years  | Date |
|                |       |           | Used   |      |
+----------------+-------+-----------+--------+------+
| Never Assessed |       |           |        |      |
+----------------+-------+-----------+--------+------+
 
 
 
+------------------+---------------+
| Sex Assigned at  | Date Recorded |
| Birth            |               |
+------------------+---------------+
| Not on file      |               |
+------------------+---------------+
 documented as of this encounter
 
 Plan of Treatment
 Not on filedocumented as of this encounter
 
 Visit Diagnoses
 Not on filedocumented in this encounter

## 2020-09-28 NOTE — XMS
Encounter Summary
  Created on: 2020
 
 Marie Fermin
 External Reference #: [id number]
 : [birth year]
 Sex: Female
 
 Demographics
 
 
+-----------------------+------------------------+
| Address               | [address]          |
|                       | [place]  [address]      |
+-----------------------+------------------------+
| Home Phone            | [phone]        |
+-----------------------+------------------------+
| Preferred Language    | Unknown                |
+-----------------------+------------------------+
| Marital Status        |                 |
+-----------------------+------------------------+
| Alevism Affiliation | Unknown                |
+-----------------------+------------------------+
| Race                  | White                  |
+-----------------------+------------------------+
| Ethnic Group          | Not  or  |
+-----------------------+------------------------+
 
 
 Author
 
 
+--------------+--------------------------------------------+
| Author       | [facility]  |
|              | and [place]                                |
+--------------+--------------------------------------------+
| Organization | [facility]  |
|              | and [place]                                |
+--------------+--------------------------------------------+
| Address      | Unknown                                    |
+--------------+--------------------------------------------+
| Phone        | Unavailable                                |
+--------------+--------------------------------------------+
 
 
 
 Support
 
 
+-----------------+--------------+---------+-----------------+
| Name            | Relationship | Address | Phone           |
+-----------------+--------------+---------+-----------------+
| Rodríguez Fermin | ECON         | Unknown | [phone] |
+-----------------+--------------+---------+-----------------+
 
 
 
 Care Team Providers
 
 
 
+-----------------------+------+-------------+
| Care Team Member Name | Role | Phone       |
+-----------------------+------+-------------+
| No, Physician         | PCP  | Unavailable |
+-----------------------+------+-------------+
 
 
 
 Reason for Visit
 
 
+-----------+-------------------------+
| Reason    | Comments                |
+-----------+-------------------------+
| Back Pain | Rm5 back/shoulder/neck  |
+-----------+-------------------------+
 
 
 
 Encounter Details
 
 
+--------+---------+----------------------+----------------------+----------------------+
| Date   | Type    | Department           | Care Team            | Description          |
+--------+---------+----------------------+----------------------+----------------------+
| / | Office  |   [facility]   |   Johnathan Belle MD | Muscle strain of     |
|    | Visit   | CARE  [address] |   [address]     | chest wall, initial  |
|        |         |   [place], [place]    | [place]      | encounter (Primary   |
|        |         | [address]           | [address]  [phone]  | Dx); Civilian        |
|        |         | [phone]         |  [phone] (Fax)  | activity done for    |
|        |         |                      |                      | income or pay; Place |
|        |         |                      |                      |  of occurrence,      |
|        |         |                      |                      | industrial places    |
|        |         |                      |                      | and premises;        |
|        |         |                      |                      | Overexertion from    |
|        |         |                      |                      | sudden strenuous     |
|        |         |                      |                      | movement, initial    |
|        |         |                      |                      | encounter            |
+--------+---------+----------------------+----------------------+----------------------+
 
 
 
 Social History
 
 
+--------------------+-------+-----------+--------+------+
| Tobacco Use        | Types | Packs/Day | Years  | Date |
|                    |       |           | Used   |      |
+--------------------+-------+-----------+--------+------+
| Current Every Day  |       | 1         |        |      |
| Smoker             |       |           |        |      |
+--------------------+-------+-----------+--------+------+
 
 
 
+---------------------+---+---+---+
| Smokeless Tobacco:  |   |   |   |
| Never Used          |   |   |   |
 
+---------------------+---+---+---+
 
 
 
+-------------+-------------+---------+----------+
| Alcohol Use | Drinks/Week | oz/Week | Comments |
+-------------+-------------+---------+----------+
| Not Asked   |             |         |          |
+-------------+-------------+---------+----------+
 
 
 
+------------------+---------------+
| Sex Assigned at  | Date Recorded |
| Birth            |               |
+------------------+---------------+
| Not on file      |               |
+------------------+---------------+
 documented as of this encounter
 
 Last Filed Vital Signs
 
 
+-------------------+---------------------+----------------------+----------+
| Vital Sign        | Reading             | Time Taken           | Comments |
+-------------------+---------------------+----------------------+----------+
| Blood Pressure    | 108/62              | 2014 12:19 PM  |          |
|                   |                     | PDT                  |          |
+-------------------+---------------------+----------------------+----------+
| Pulse             | 75                  | 2014 12:19 PM  |          |
|                   |                     | PDT                  |          |
+-------------------+---------------------+----------------------+----------+
| Temperature       | 36.9   C (98.4   F) | 2014 12:19 PM  |          |
|                   |                     | PDT                  |          |
+-------------------+---------------------+----------------------+----------+
| Respiratory Rate  | 18                  | 2014 12:19 PM  |          |
|                   |                     | PDT                  |          |
+-------------------+---------------------+----------------------+----------+
| Oxygen Saturation | 98%                 | 2014 12:19 PM  |          |
|                   |                     | PDT                  |          |
+-------------------+---------------------+----------------------+----------+
| Inhaled Oxygen    | -                   | -                    |          |
| Concentration     |                     |                      |          |
+-------------------+---------------------+----------------------+----------+
| Weight            | 104.8 kg (231 lb)   | 2014 12:19 PM  |          |
|                   |                     | PDT                  |          |
+-------------------+---------------------+----------------------+----------+
| Height            | 165.1 cm (5' 5")    | 2014 12:19 PM  |          |
|                   |                     | PDT                  |          |
+-------------------+---------------------+----------------------+----------+
| Body Mass Index   | 38.44               | 2014 12:19 PM  |          |
|                   |                     | PDT                  |          |
+-------------------+---------------------+----------------------+----------+
 documented in this encounter
 
 Patient Instructions
 Patient Instructions Johnathan Belle MD - 2014 12:40 PM PDT
 Chest Strain
 
 A strain of the chest is due to stretching and tearing of the muscle fibers between the rib
 
s. This may occur as a result of severe coughing, strenuous lifting or twisting injuries of 
the upper back.
 This usually causes increased pain with movement or deep breathing. This may take a few day
s to a few weeks to heal.
 Home Care:
  Rest. Avoid heavy lifting or strenuous exertion. Avoid any activity that causes pain.
  If you have a severe cough, use a cough syrup such as Robitussin DM (containing dextrome
thorphan) unless another cough medicine was prescribed.
  You may use acetaminophen (Tylenol) or ibuprofen (Motrin, Advil) to control pain, unless
 another medicine was prescribed. [ NOTE: If you have chronic liver or kidney disease or facundo
r had a stomach ulcer or GI bleeding, talk with your doctor before using these medicines.]
 Follow Up
 with your doctor as directed.
 Get Prompt Medical Attention
 if any of the following occur:
  A change in the type of pain: if it feels different, becomes more severe, lasts longer, 
or begins to spread into your shoulder, arm, neck, jaw or back
  Shortness of breath or increased pain with breathing
  Cough with dark colored sputum (phlegm) or blood
  Weakness, dizziness, or fainting
  Fever of 100.4F (38C) or higher, or as directed by your healthcare provider
  0220-4104 [facility], [address] [place] [address]. All rights reserve
d. This information is not intended as a substitute for professional medical care. Always fo
llow your healthcare professional's instructions.
 Electronically signed by Johnathan Belle MD at 2014 12:40 PM PDT
 documented in this encounter
 
 Progress Notes
 Johnathan Belle MD - 2014 12:32 PM PDTFormatting of this note might be different from
 the original.
 Subjective: 
  
 Patient ID: Marie Fermin is a 39 y.o. female.  48 hours ago, while at work, she walked
 into a freezer and slipped on some ice on the floor.  She slipped backwards but did not act
ually fall, and did not actually strike anything.  Rather, she rapidly extended her arms and
 raised her neck.  Later in the day, she began to have pain high in the back, posterior and 
anterior shoulders.  Her pain was worse yesterday which included the posterior aspect of her
 neck bilaterally.  There is no radiation of pain to her arms, no paresthesia or weakness.  
There was no actual impact.  She complains of pain and stiffness over the upper pectoral reg
ions, trapezius regions bilaterally, and the posterior neck, where rotation of the neck is p
ainful.
  HPI
 Patient's medications, allergies, past medical, surgical, social and family histories were 
reviewed and updated as appropriate.
 
 Review of Systems  Negative otherwise
 
 Objective: 
 Physical Exam 
 Constitutional: She appears well-developed and well-nourished. No distress. 
 Neck: 
 
      Posterior cervical muscles are tender and tight.  Rotation of the neck is full if done
 slowly, and there is some reduction in flexion of the neck due to pain and spasm. 
 Musculoskeletal: 
      Arms:
      Areas of muscle pain and tenderness to palpation as illustrated.   strength is nor
mal and symmetrical.  Upper extremity deep tendon reflexes are brisk and symmetrical.  No we
akness. 
 
 
 Assessment: 
 
 Chest wall strain, anterior and posterior
 Posterior cervical strain
 
 Plan: 
 
 Patient is returned to modified duty, with reduction in lifting to less than 5 pounds, no o
verhead work, no response to emergency.
 Nabumetone 500 mg twice a day.  Methocarbamol 500 mg at bedtime x7 days.  Local ice.  Iesha hairston in occupational medicine in 5 days.
 
 Electronically signed by Johnathan Belle MD at 2014  4:31 PM PDTdocumented in this enc
ounter
 
 Miscellaneous Notes
 Plan of Care - ONBASE SCAN [facility] - 2014 12:00 AM PDTElectronically signed by ROLDAN Rabago at 10/01/2014  2:44 PM PDTPlan of Care - ONBASE SCAN [facility] - 2014 12:00 AM PDTElect
ronically signed by Anoop Rabago at 10/01/2014  2:44 PM PDTPlan of Care - ONBASE SCAN [facility] -
 2014 12:00 AM PDTElectronically signed by Anoop Rabago at 10/01/2014  2:44 PM PDTdocu
mented in this encounter
 
 Plan of Treatment
 Not on filedocumented as of this encounter
 
 Visit Diagnoses
 
 
+------------------------------------------------------------------+
| Diagnosis                                                        |
+------------------------------------------------------------------+
|   Muscle strain of chest wall, initial encounter - Primary       |
+------------------------------------------------------------------+
|   Civilian activity done for income or pay                       |
+------------------------------------------------------------------+
|   Place of occurrence, industrial places and premises            |
+------------------------------------------------------------------+
|   Overexertion from sudden strenuous movement, initial encounter |
+------------------------------------------------------------------+
 documented in this encounter

## 2020-09-28 NOTE — XMS
Clinical Summary
  Created on: 2020
 
 Marie Fermin
 External Reference #: [id number]
 : [birth year]
 Sex: Female
 
 Demographics
 
 
+-----------------------+------------------------+
| Address               | [address]          |
|                       | [place]  [address]      |
+-----------------------+------------------------+
| Home Phone            | [phone]        |
+-----------------------+------------------------+
| Preferred Language    | Unknown                |
+-----------------------+------------------------+
| Marital Status        |                 |
+-----------------------+------------------------+
| Congregation Affiliation | Unknown                |
+-----------------------+------------------------+
| Race                  | White                  |
+-----------------------+------------------------+
| Ethnic Group          | Not  or  |
+-----------------------+------------------------+
 
 
 Author
 
 
+--------------+--------------------------------------------+
| Author       | [facility]  |
|              | and [place]                                |
+--------------+--------------------------------------------+
| Organization | [facility]  |
|              | and [place]                                |
+--------------+--------------------------------------------+
| Address      | Unknown                                    |
+--------------+--------------------------------------------+
| Phone        | Unavailable                                |
+--------------+--------------------------------------------+
 
 
 
 Support
 
 
+-----------------+--------------+---------+-----------------+
| Name            | Relationship | Address | Phone           |
+-----------------+--------------+---------+-----------------+
| Rodríguez Fermin | ECON         | Unknown | [phone] |
+-----------------+--------------+---------+-----------------+
 
 
 
 Care Team Providers
 
 
 
+-----------------------+------+-----------------+
| Care Team Member Name | Role | Phone           |
+-----------------------+------+-----------------+
| ROLDAN Horta NP   | PCP  | [phone] |
+-----------------------+------+-----------------+
 
 
 
 Allergies
 
 
+----------------+----------------------+----------+----------+----------------------+
| Active Allergy | Reactions            | Severity | Noted    | Comments             |
|                |                      |          | Date     |                      |
+----------------+----------------------+----------+----------+----------------------+
| Aspirin        | Other (See Comments) | Medium   | 20 |   Patient reports    |
|                |                      |          | 16       | history of bleeding  |
|                |                      |          |          | ulcer at age 5       |
+----------------+----------------------+----------+----------+----------------------+
| Food           | Swelling             | Medium   | 11/12/20 |   Mangos cause       |
|                |                      |          | 14       | swelling  Walnuts    |
|                |                      |          |          | causeTongue swelling |
|                |                      |          |          |    Mangos cause      |
|                |                      |          |          | swelling             |
+----------------+----------------------+----------+----------+----------------------+
| Peanuts        | Swelling             | Medium   | //20 |   Walnuts            |
|                |                      |          | 14       | causeTongue swelling |
|                |                      |          |          |                      |
+----------------+----------------------+----------+----------+----------------------+
 
 
 
 Medications
 
 
+----------------------+----------------------+-----------+---------+------+------+-------+
| Medication           | Sig                  | Dispensed | Refills | Star | End  | Statu |
|                      |                      |           |         | t    | Date | s     |
|                      |                      |           |         | Date |      |       |
+----------------------+----------------------+-----------+---------+------+------+-------+
|   ibuprofen (ADVIL,  | Take 400 mg by mouth |           | 0       |      |      | Activ |
| MOTRIN) 200 mg       |  every 6 hours as    |           |         |      |      | e     |
| tablet               | needed for Pain.     |           |         |      |      |       |
+----------------------+----------------------+-----------+---------+------+------+-------+
|   fenofibrate 160 mg | Take 160 mg by mouth |           | 0       |      |      | Activ |
|  tablet              |  Daily.              |           |         |      |      | e     |
+----------------------+----------------------+-----------+---------+------+------+-------+
 
 
 
 Active Problems
 
 
+-------------------+------------+
| Problem           | Noted Date |
+-------------------+------------+
| Backache          | 2016 |
+-------------------+------------+
 
| Pelvic pain       | 2016 |
+-------------------+------------+
| Uterine leiomyoma | 2016 |
+-------------------+------------+
 
 
 
+--------------------------+
|   Overview:   Overview:  |
| 17 cm fundal             |
+--------------------------+
 
 
 
+-----------+------------+
| Knee pain | 1998 |
+-----------+------------+
 
 
 
 Family History
 
 
+---------------------+-----------+----------+----------+
| Medical History     | Relation  | Name     | Comments |
+---------------------+-----------+----------+----------+
| Asthma              | Daughter  | Sheyanne |          |
+---------------------+-----------+----------+----------+
| Arthritis           | Father    | Maykel      |          |
+---------------------+-----------+----------+----------+
| COPD                | Father    | Maykel      |          |
+---------------------+-----------+----------+----------+
| Depression          | Father    | Maykel      |          |
+---------------------+-----------+----------+----------+
| Diabetes            | Father    | Maykel      |          |
+---------------------+-----------+----------+----------+
| High blood pressure | Father    | Maykel      |          |
+---------------------+-----------+----------+----------+
| High cholesterol    | Father    | Maykel      |          |
+---------------------+-----------+----------+----------+
| Cancer              | Maternal  | Mayra     |          |
|                     | Aunt      |          |          |
+---------------------+-----------+----------+----------+
| Cancer              | Maternal  | Amber     |          |
|                     | Aunt      |          |          |
+---------------------+-----------+----------+----------+
| Stroke              | Maternal  | Damion   |          |
|                     | Grandfath |          |          |
|                     | er        |          |          |
+---------------------+-----------+----------+----------+
| Diabetes            | Maternal  | Nancy    |          |
|                     | Grandmoth |          |          |
|                     | er        |          |          |
+---------------------+-----------+----------+----------+
| Arthritis           | Mother    | Jeovany    |          |
+---------------------+-----------+----------+----------+
| Asthma              | Mother    | Jeovany    |          |
+---------------------+-----------+----------+----------+
| COPD                | Mother    | Jeovany    |          |
+---------------------+-----------+----------+----------+
 
| Depression          | Mother    | Jeovany    |          |
+---------------------+-----------+----------+----------+
| Diabetes            | Mother    | Jeovany    |          |
+---------------------+-----------+----------+----------+
| High blood pressure | Mother    | Jeovany    |          |
+---------------------+-----------+----------+----------+
| High cholesterol    | Mother    | Jeovany    |          |
+---------------------+-----------+----------+----------+
| Stroke              | Mother    | Jeovany    | CVA      |
+---------------------+-----------+----------+----------+
| Diabetes            | Paternal  | Balaji   |          |
|                     | Grandfath |          |          |
|                     | er        |          |          |
+---------------------+-----------+----------+----------+
| Stroke              | Paternal  | DeWillow |          |
|                     | Grandmoth |          |          |
|                     | er        |          |          |
+---------------------+-----------+----------+----------+
| Asthma              | Sister    | Yary  |          |
+---------------------+-----------+----------+----------+
| Depression          | Sister    | Valerie     |          |
+---------------------+-----------+----------+----------+
| High blood pressure | Sister    | Valerie     |          |
+---------------------+-----------+----------+----------+
| High blood pressure | Sister    | Serena    |          |
+---------------------+-----------+----------+----------+
 
 
 
+----------------------+----------+--------+----------+
| Relation             | Name     | Status | Comments |
+----------------------+----------+--------+----------+
| Daughter             | Sheyanne | Alive  |          |
+----------------------+----------+--------+----------+
| Father               | Maykel      |        |          |
+----------------------+----------+--------+----------+
| Maternal Aunt        | Amyra     |        |          |
+----------------------+----------+--------+----------+
| Maternal Aunt        | Amber     |        |          |
+----------------------+----------+--------+----------+
| Maternal Grandfather | Damion   |        |          |
+----------------------+----------+--------+----------+
| Maternal Grandmother | Nancy    |        |          |
+----------------------+----------+--------+----------+
| Mother               | Jeovany    |        |          |
+----------------------+----------+--------+----------+
| Paternal Grandfather | Balaji   |        |          |
+----------------------+----------+--------+----------+
| Paternal Grandmother | DeWillow |        |          |
+----------------------+----------+--------+----------+
| Sister               | Yary  |        |          |
+----------------------+----------+--------+----------+
| Sister               | Valerie     |        |          |
+----------------------+----------+--------+----------+
| Sister               | Serena    |        |          |
+----------------------+----------+--------+----------+
 
 
 
 Social History
 
 
 
+--------------------+------------+-----------+--------+---------------+
| Tobacco Use        | Types      | Packs/Day | Years  | Date          |
|                    |            |           | Used   |               |
+--------------------+------------+-----------+--------+---------------+
| Current Every Day  | Cigarettes | 1         | 15     | Started:  |
| Smoker             |            |           |        |               |
+--------------------+------------+-----------+--------+---------------+
 
 
 
+---------------------+---+---+---+
| Smokeless Tobacco:  |   |   |   |
| Never Used          |   |   |   |
+---------------------+---+---+---+
 
 
 
+-------------+-------------+---------+----------+
| Alcohol Use | Drinks/Week | oz/Week | Comments |
+-------------+-------------+---------+----------+
| Never       |             |         |          |
+-------------+-------------+---------+----------+
 
 
 
+---------------------------------------------+-----------+---------------+
| Alcohol Habits                              | Answer    | Date Recorded |
+---------------------------------------------+-----------+---------------+
| How often do you have a drink containing    | Never     | 10/24/2019    |
| alcohol?                                    |           |               |
+---------------------------------------------+-----------+---------------+
| How many drinks containing alcohol do you   | Not asked |               |
| have on a typical day when you are          |           |               |
| drinking?                                   |           |               |
+---------------------------------------------+-----------+---------------+
| How often do you have six or more drinks on | Not asked |               |
|  one occasion?                              |           |               |
+---------------------------------------------+-----------+---------------+
 
 
 
+------------------+---------------+
| Sex Assigned at  | Date Recorded |
| Birth            |               |
+------------------+---------------+
| Not on file      |               |
+------------------+---------------+
 
 
 
 Last Filed Vital Signs
 
 
+-------------------+---------------------+----------------------+----------+
| Vital Sign        | Reading             | Time Taken           | Comments |
+-------------------+---------------------+----------------------+----------+
| Blood Pressure    | 116/64              | 10/22/2019  3:25 PM  |          |
|                   |                     | PDT                  |          |
 
+-------------------+---------------------+----------------------+----------+
| Pulse             | 104                 | 2019  8:29 AM  |          |
|                   |                     | PST                  |          |
+-------------------+---------------------+----------------------+----------+
| Temperature       | 36.8   C (98.2   F) | 2019  8:29 AM  |          |
|                   |                     | PST                  |          |
+-------------------+---------------------+----------------------+----------+
| Respiratory Rate  | 14                  | 10/22/2019  3:25 PM  |          |
|                   |                     | PDT                  |          |
+-------------------+---------------------+----------------------+----------+
| Oxygen Saturation | 97%                 | 2019  8:29 AM  |          |
|                   |                     | PST                  |          |
+-------------------+---------------------+----------------------+----------+
| Inhaled Oxygen    | -                   | -                    |          |
| Concentration     |                     |                      |          |
+-------------------+---------------------+----------------------+----------+
| Weight            | 105.6 kg (232 lb    | 10/22/2019  3:25 PM  |          |
|                   | 12.9 oz)            | PDT                  |          |
+-------------------+---------------------+----------------------+----------+
| Height            | 165.1 cm (5' 5")    | 10/22/2019  3:25 PM  |          |
|                   |                     | PDT                  |          |
+-------------------+---------------------+----------------------+----------+
| Body Mass Index   | 38.74               | 10/22/2019  3:25 PM  |          |
|                   |                     | PDT                  |          |
+-------------------+---------------------+----------------------+----------+
 
 
 
 Plan of Treatment
 
 
+---------------------+-----------+----------+----------+
| Health Maintenance  | Due Date  | Last     | Comments |
|                     |           | Done     |          |
+---------------------+-----------+----------+----------+
| Hepatitis C         | 05/15/197 |          |          |
| Screening           | 5         |          |          |
+---------------------+-----------+----------+----------+
| Medication          | 05/15/197 |          |          |
| Management          | 5         |          |          |
+---------------------+-----------+----------+----------+
| Vaccine:            | 05/15/198 |          |          |
| Pneumococcal 19-64  | 1         |          |          |
| (1 of 1 - PPSV23)   |           |          |          |
+---------------------+-----------+----------+----------+
| Vaccine:            | 05/15/199 | 19 |          |
| Dtap/Tdap/Td (2 -   | 4         | 75       |          |
| Tdap)               |           |          |          |
+---------------------+-----------+----------+----------+
| Med Mgmt: BUN       |  | 20 |          |
|                     | 7         | 16,      |          |
|                     |           | 20 |          |
|                     |           | 16,      |          |
|                     |           | 20 |          |
|                     |           | 15       |          |
+---------------------+-----------+----------+----------+
| Med Mgmt: Cr        |  | 20 |          |
|                     | 7         | 16,      |          |
|                     |           | 20 |          |
|                     |           | 16,      |          |
 
|                     |           | 20 |          |
|                     |           | 15       |          |
+---------------------+-----------+----------+----------+
| Med Mgmt: eGFR      |  | 20 |          |
|                     | 7         | 16,      |          |
|                     |           | 20 |          |
|                     |           | 16,      |          |
|                     |           | 20 |          |
|                     |           | 15       |          |
+---------------------+-----------+----------+----------+
| Breast Cancer       | 05/15/202 |          |          |
| Screening           | 0         |          |          |
+---------------------+-----------+----------+----------+
| Vaccine: Influenza  |  | 10/22/20 |          |
| (#1)                | 0         | 19       |          |
+---------------------+-----------+----------+----------+
 
 
 
 Results
 Not on filefrom Last 3 Months
 
 Insurance
 
 
+------------------+--------+-------------+--------+-------------+---------+------+
| Payer            | Benefi | Subscriber  | Effect | Phone       | Address | Type |
|                  | t Plan | ID          | yulissa    |             |         |      |
|                  |  /     |             | Dates  |             |         |      |
|                  | Group  |             |        |             |         |      |
+------------------+--------+-------------+--------+-------------+---------+------+
| [facility]  | [place] | [id number]    |  | [phone] |         | PPO  |
| HEALTH           |  [place]    |             | [id number] | 0           |         |      |
|                  | GROUP  |             | esent  |             |         |      |
|                  | HEALTH |             |        |             |         |      |
|                  |  FIRST |             |        |             |         |      |
|                  |        |             |        |             |         |      |
|                  | CHOICE |             |        |             |         |      |
+------------------+--------+-------------+--------+-------------+---------+------+
 
 
 
+-------------------+--------+-------------+--------+-------------+--------------------+
| Guarantor Name    | Accoun | Relation to | Date   | Phone       | Billing Address    |
|                   | t Type |  Patient    | of     |             |                    |
|                   |        |             | Birth  |             |                    |
+-------------------+--------+-------------+--------+-------------+--------------------+
| Marie Fermin | Person | Self        | [birth year]             |   [address]    |
|                   | al/Fam |             |    |  | [place] [address]   |
|                   | dwight    |             |        | 6 (Home)    |                    |
+-------------------+--------+-------------+--------+-------------+--------------------+
| Marie Fermin | Worker | Self        | [birth year] |             |   [address]    |
|                   | s Comp |             |    |  | [place]    |
|                   |        |             |        | 6 (Home)    | [id number]              |
+-------------------+--------+-------------+--------+-------------+--------------------+
| Marie Fermin | Third  | Self        | [birth year] |             |   [address]    |
|                   | Party  |             | 1975   | [phone] | [place]    |
|                   | [place] |             |        | [address] (Home)    | [id number]              |
|                   | [place]    |             |        |             |                    |
+-------------------+--------+-------------+--------+-------------+--------------------+
 
 
 
 
 Advance Directives
 
 
+-----------+---------------+----------------+-------------+
| Type      | Date Recorded | Patient        | Explanation |
|           |               | Representative |             |
+-----------+---------------+----------------+-------------+
| Power of  |               |                |             |
|   |               |                |             |
+-----------+---------------+----------------+-------------+
| Advance   |               |                |             |
| Directive |               |                |             |
+-----------+---------------+----------------+-------------+

## 2020-09-28 NOTE — XMS
Encounter Summary
  Created on: 2020
 
 Marie Fermin
 External Reference #: [id number]
 : [birth year]
 Sex: Female
 
 Demographics
 
 
+-----------------------+------------------------+
| Address               | [address]          |
|                       | [place]  [address]      |
+-----------------------+------------------------+
| Home Phone            | [phone]        |
+-----------------------+------------------------+
| Preferred Language    | Unknown                |
+-----------------------+------------------------+
| Marital Status        |                 |
+-----------------------+------------------------+
| Pentecostalism Affiliation | Unknown                |
+-----------------------+------------------------+
| Race                  | White                  |
+-----------------------+------------------------+
| Ethnic Group          | Not  or  |
+-----------------------+------------------------+
 
 
 Author
 
 
+--------------+--------------------------------------------+
| Author       | [facility]  |
|              | [facility] [place]                                |
+--------------+--------------------------------------------+
| Organization | [facility]  |
|              | [facility] [place]                                |
+--------------+--------------------------------------------+
| Address      | Unknown                                    |
+--------------+--------------------------------------------+
| Phone        | Unavailable                                |
+--------------+--------------------------------------------+
 
 
 
 Support
 
 
+-----------------+--------------+---------+-----------------+
| Name            | Relationship | Address | Phone           |
+-----------------+--------------+---------+-----------------+
| Rodríguez Fermin | ECON         | Unknown | [phone] |
+-----------------+--------------+---------+-----------------+
 
 
 
 Care Team Providers
 
 
 
+-----------------------+------+-------------+
| Care Team Member Name | Role | Phone       |
+-----------------------+------+-------------+
| No, Physician         | PCP  | Unavailable |
+-----------------------+------+-------------+
 
 
 
 Reason for Referral
 Evaluate & Treat (Routine)
 
+--------+--------------+------------+--------------+--------------+---------------+
| Status | Reason       | Specialty  | Diagnoses /  | Referred By  | Referred To   |
|        |              |            | Procedures   | Contact      | Contact       |
+--------+--------------+------------+--------------+--------------+---------------+
| Closed |   Specialty  | Gynecology |   Diagnoses  |   Umang,  |   Angel,      |
|        | Services     |            |  Uterine     | Josselyn      | RAE Ross  |
|        | Required     |            | mass         | DO Jeovany    |  [address] |
|        |              |            |              | [address] |    [address]  |
|        |              |            |              |   [address]      |  [place], [place] |
|        |              |            |              | [place]    |  [address]        |
|        |              |            |              | [address]        | Phone:        |
|        |              |            |              | Phone:       | [phone]  |
|        |              |            |              | [phone] |  Fax:         |
|        |              |            |              |   Fax:       | [phone]  |
|        |              |            |              | [phone] |               |
+--------+--------------+------------+--------------+--------------+---------------+
 
 
 
 
 Reason for Visit
 
 
+---------+-------------------------------------------------------------------+
| Reason  | Comments                                                          |
+---------+-------------------------------------------------------------------+
| Consult | Room 2  Had an abnormal result on MRI recently that is upsetting  |
|         | to her. Wants to get more information to help with decision       |
|         | making..                                                          |
+---------+-------------------------------------------------------------------+
 
 
 
 Encounter Details
 
 
+--------+---------+----------------------+----------------------+---------------+
| Date   | Type    | Department           | Care Team            | Description   |
+--------+---------+----------------------+----------------------+---------------+
| / | Office  |   [facility]   |   Josselyn Heck  | Uterine mass  |
| 2016   | Visit   | CARE  [address] | DO Jeovany  [address] | (Primary Dx)  |
|        |         |   [place]    |  ST  [place], [place] |               |
|        |         | [address]           |  [address] [phone] |               |
|        |         | [phone]         |   [phone] (Fax) |               |
+--------+---------+----------------------+----------------------+---------------+
 
 
 
 
 Social History
 
 
+--------------------+-------+-----------+--------+------+
| Tobacco Use        | Types | Packs/Day | Years  | Date |
|                    |       |           | Used   |      |
+--------------------+-------+-----------+--------+------+
| Current Every Day  |       | 1         |        |      |
| Smoker             |       |           |        |      |
+--------------------+-------+-----------+--------+------+
 
 
 
+---------------------+---+---+---+
| Smokeless Tobacco:  |   |   |   |
| Never Used          |   |   |   |
+---------------------+---+---+---+
 
 
 
+-------------+-------------+---------+----------+
| Alcohol Use | Drinks/Week | oz/Week | Comments |
+-------------+-------------+---------+----------+
| Not Asked   |             |         |          |
+-------------+-------------+---------+----------+
 
 
 
+------------------+---------------+
| Sex Assigned at  | Date Recorded |
| Birth            |               |
+------------------+---------------+
| Not on file      |               |
+------------------+---------------+
 documented as of this encounter
 
 Last Filed Vital Signs
 
 
+-------------------+-------------------+----------------------+----------+
| Vital Sign        | Reading           | Time Taken           | Comments |
+-------------------+-------------------+----------------------+----------+
| Blood Pressure    | 114/55            | 2016  3:22 PM  |          |
|                   |                   | PDT                  |          |
+-------------------+-------------------+----------------------+----------+
| Pulse             | 98                | 2016  3:22 PM  |          |
|                   |                   | PDT                  |          |
+-------------------+-------------------+----------------------+----------+
| Temperature       | 37   C (98.6   F) | 2016  3:22 PM  |          |
|                   |                   | PDT                  |          |
+-------------------+-------------------+----------------------+----------+
| Respiratory Rate  | 16                | 2016  3:22 PM  |          |
|                   |                   | PDT                  |          |
+-------------------+-------------------+----------------------+----------+
| Oxygen Saturation | 95%               | 2016  3:22 PM  |          |
|                   |                   | PDT                  |          |
+-------------------+-------------------+----------------------+----------+
| Inhaled Oxygen    | -                 | -                    |          |
| Concentration     |                   |                      |          |
 
+-------------------+-------------------+----------------------+----------+
| Weight            | 104.3 kg (230 lb) | 2016  3:22 PM  |          |
|                   |                   | PDT                  |          |
+-------------------+-------------------+----------------------+----------+
| Height            | 165.1 cm (5' 5")  | 2016  3:22 PM  |          |
|                   |                   | PDT                  |          |
+-------------------+-------------------+----------------------+----------+
| Body Mass Index   | 38.27             | 2016  3:22 PM  |          |
|                   |                   | PDT                  |          |
+-------------------+-------------------+----------------------+----------+
 documented in this encounter
 
 Patient Instructions
 Patient Instructions Josselyn Heck DO - 2016  4:14 PM PDTPrescription for 
Medrol Dosepak has been sent to pharmacy.  Take one row each morning after food
 A referral has been made to gynecology
 Keep your follow-up appointment with Dr. Salazar
 Consider inversion boots or traction as this may help with your pain
 Return if symptoms worsening or failing to improveElectronically signed by Josselyn butterifeld DO at 2016  4:15 PM PDT
 documented in this encounter
 
 Progress Notes
 Josselyn Heck DO - 2016  5:32 PM PDTPatient is here with chief complaint o
f wanting somebody to go over her MRI results with her.  Apparently the patient has been hav
ing some lower back pain with radiation into her right groin and right leg.  Patient states 
that she had an MRI and when she got back the results she was uncertain as to what they mean
t.  She states that there was an incidental finding of a fibroid in her uterus and she wonde
rs if this relates to her back pain or not.  I had a lengthy discussion with the patient.  W
mono went over her incidental finding of a fibroid which is likely benign but does need follow 
up to make sure. She was unable to get in to [place] [facility] because of an old billing th
at has already been paid but they still want see her.  She did get a referral from me to the
 women's clinic in [facility].  She will consult with them with regards to the fibroid and w
hat to do about it.  In the meantime the patient also wants to discuss her lumbar spine find
ings.  We went over the findings and there is a small right disc protrusion at L5-S1 which w
ould explain her pain radiating into her right lower extremity.  Patient has had no neurosur
gical emergent symptoms of loss of bowel or bladder control and no saddle anesthesia and no 
weakness to the leg.  She has a follow-up appointment with Dr. Tineo who will be addres
sing this issue.  She states that her abdominal pain and heavy periods have been bothering h
er a lot lately and that she would be interested in a hysterectomy which can be done at the 
clinic she is going to be going to. She wonders if the fibroid could be affecting her back p
ain and I explained to her that i didn't think so showing her the MRI images and how the fib
roid couldn't be impacting the nerve causing her radicular pain.  She has two different prob
lems that need to be dealt with and that they just happened to be found at the same time.  S
he felt relieved and understood and i advised that she keep both her followups and not miss 
any appointments.  Pt comfortable with plan.  Spent about 20 minutes with this.  SHe has not
 had any new or worsening symptoms since her last visit.  SHe has never tried a medrol dose 
back for her back pain and one was given to see if it would help. Pt advised to return if ne
w or worsening symptoms prior to her other appointments. Referral to the [facility] was placed and imaging report was sent as well.
 Electronically signed by Josselyn Heck DO at 2016  5:45 PM PDTdocumented in
 this encounter
 
 Plan of Treatment
 
 
+----------------------+-------------+--------+----------------------+---------------------+
| Name                 | Type        | Priori | Associated Diagnoses | Order Schedule      |
|                      |             | ty     |                      |                     |
+----------------------+-------------+--------+----------------------+---------------------+
 
| Gynecology, External | Outpatient  | Routin |   Uterine mass       | Ordered: 2016 |
|  - AMB Referral      | Referral    | e      |                      |                     |
+----------------------+-------------+--------+----------------------+---------------------+
 documented as of this encounter
 
 Visit Diagnoses
 
 
+-----------------------------------------------------------------------------------------+
| Diagnosis                                                                               |
+-----------------------------------------------------------------------------------------+
|   Uterine mass - Primary  Other specified symptom associated with female genital organs |
+-----------------------------------------------------------------------------------------+
 documented in this encounter

## 2020-09-28 NOTE — XMS
Encounter Summary
  Created on: 2020
 
 Marie Fermin
 External Reference #: [id number]
 : [birth year]
 Sex: Female
 
 Demographics
 
 
+-----------------------+------------------------+
| Address               | [address]          |
|                       | [place]  [address]      |
+-----------------------+------------------------+
| Home Phone            | [phone]        |
+-----------------------+------------------------+
| Preferred Language    | Unknown                |
+-----------------------+------------------------+
| Marital Status        |                 |
+-----------------------+------------------------+
| Nondenominational Affiliation | Unknown                |
+-----------------------+------------------------+
| Race                  | White                  |
+-----------------------+------------------------+
| Ethnic Group          | Not  or  |
+-----------------------+------------------------+
 
 
 Author
 
 
+--------------+--------------------------------------------+
| Author       | [facility]  |
|              | and [place]                                |
+--------------+--------------------------------------------+
| Organization | [facility]  |
|              | and [place]                                |
+--------------+--------------------------------------------+
| Address      | Unknown                                    |
+--------------+--------------------------------------------+
| Phone        | Unavailable                                |
+--------------+--------------------------------------------+
 
 
 
 Support
 
 
+-----------------+--------------+---------+-----------------+
| Name            | Relationship | Address | Phone           |
+-----------------+--------------+---------+-----------------+
| Rodríguez Fermin | ECON         | Unknown | [phone] |
+-----------------+--------------+---------+-----------------+
 
 
 
 Care Team Providers
 
 
 
+-----------------------+------+-------------+
| Care Team Member Name | Role | Phone       |
+-----------------------+------+-------------+
| No, Physician         | PCP  | Unavailable |
+-----------------------+------+-------------+
 
 
 
 Encounter Details
 
 
+--------+-----------+----------------------+----------------------+-------------+
| Date   | Type      | Department           | Care Team            | Description |
+--------+-----------+----------------------+----------------------+-------------+
| 04/15/ | [facility]  |   [facility] |   Segundo Botello    |             |
|    | Encounter |  MED CTR ULTRASOUND  | Godwin Samayoa MD      |             |
|        |           |  [address]  [address] | [address]       |             |
|        |           |  [place]           | [place]      |             |
|        |           | [address]           | [address]  [phone]  |             |
|        |           | [phone]         |  [phone] (Fax)  |             |
|        |           |                      |  Tung Malave, |             |
|        |           |                      |  Technologist        |             |
+--------+-----------+----------------------+----------------------+-------------+
 
 
 
 Social History
 
 
+--------------------+-------+-----------+--------+------+
| Tobacco Use        | Types | Packs/Day | Years  | Date |
|                    |       |           | Used   |      |
+--------------------+-------+-----------+--------+------+
| Current Every Day  |       | 1         |        |      |
| Smoker             |       |           |        |      |
+--------------------+-------+-----------+--------+------+
 
 
 
+---------------------+---+---+---+
| Smokeless Tobacco:  |   |   |   |
| Never Used          |   |   |   |
+---------------------+---+---+---+
 
 
 
+-------------+-------------+---------+----------+
| Alcohol Use | Drinks/Week | oz/Week | Comments |
+-------------+-------------+---------+----------+
| Not Asked   |             |         |          |
+-------------+-------------+---------+----------+
 
 
 
+------------------+---------------+
| Sex Assigned at  | Date Recorded |
| Birth            |               |
+------------------+---------------+
 
| Not on file      |               |
+------------------+---------------+
 documented as of this encounter
 
 Medications at Time of Discharge
 
 
+----------------------+----------------------+-----------+---------+----------+-----------+
| Medication           | Sig                  | Dispensed | Refills | Start    | End Date  |
|                      |                      |           |         | Date     |           |
+----------------------+----------------------+-----------+---------+----------+-----------+
|   albuterol          | Inhale 2 puffs into  |   1       | 3       | 20 |  |
| (VENTOLIN HFA) 90    | the lungs every 4    | Inhaler   |         | 15       | 6         |
| mcg/puff             | hours as needed for  |           |         |          |           |
| inhalerIndications:  | Wheezing.            |           |         |          |           |
| Asthma, mild         |                      |           |         |          |           |
| intermittent,        |                      |           |         |          |           |
| uncomplicated        |                      |           |         |          |           |
+----------------------+----------------------+-----------+---------+----------+-----------+
|   albuterol 2.5 mg/3 | Use one vial in your |   60 vial | 0       | 20 | 10/08/201 |
|  mL nebulizer        |  nebulizer every 4   |           |         | 15       | 6         |
| solutionIndications: | hours as needed for  |           |         |          |           |
|  Asthma, mild        | wheezing             |           |         |          |           |
| intermittent,        |                      |           |         |          |           |
| uncomplicated        |                      |           |         |          |           |
+----------------------+----------------------+-----------+---------+----------+-----------+
|   Cyclobenzaprine    | Take  by mouth.      |           | 0       |          |  |
| HCl (FLEXERIL PO)    |                      |           |         |          | 6         |
+----------------------+----------------------+-----------+---------+----------+-----------+
|   ibuprofen (ADVIL,  | Take 400 mg by mouth |           | 0       |          |  |
| MOTRIN) 200 mg       |  every 6 hours as    |           |         |          | 6         |
| tablet               | needed.              |           |         |          |           |
+----------------------+----------------------+-----------+---------+----------+-----------+
|   methocarbamol      | Take 1 tablet by     |   60      | 0       | 20 |  |
| (ROBAXIN) 750 mg     | mouth 4 times daily  | tablet    |         | 16       | 6         |
| tabletIndications:   | for 15 days.         |           |         |          |           |
| Low back pain,       |                      |           |         |          |           |
| unspecified back     |                      |           |         |          |           |
| pain laterality,     |                      |           |         |          |           |
| unspecified          |                      |           |         |          |           |
| chronicity, with     |                      |           |         |          |           |
| sciatica presence    |                      |           |         |          |           |
| unspecified          |                      |           |         |          |           |
+----------------------+----------------------+-----------+---------+----------+-----------+
|                      | Take  by mouth.      |           | 0       |          | 10/08/201 |
| Jgwpmhgxi-TZF-AV-APA |                      |           |         |          | 6         |
| P (DIMETAPP          |                      |           |         |          |           |
| MULTISYMPTOM         |                      |           |         |          |           |
| COLD/FLU PO)         |                      |           |         |          |           |
+----------------------+----------------------+-----------+---------+----------+-----------+
|   traMADol (ULTRAM)  | One tablet orally    |   30      | 0       | 20 | 10/08/201 |
| 50 mg                | every 4 hours as     | tablet    |         | 16       | 6         |
| tabletIndications:   | needed for pain      |           |         |          |           |
| Low back pain,       |                      |           |         |          |           |
| unspecified back     |                      |           |         |          |           |
| pain laterality,     |                      |           |         |          |           |
| unspecified          |                      |           |         |          |           |
| chronicity, with     |                      |           |         |          |           |
| sciatica presence    |                      |           |         |          |           |
| unspecified          |                      |           |         |          |           |
 
+----------------------+----------------------+-----------+---------+----------+-----------+
 documented as of this encounter
 
 Plan of Treatment
 Not on filedocumented as of this encounter
 
 Procedures
 
 
+--------------------+--------+-------------+----------------------+----------------------+
| Procedure Name     | Priori | Date/Time   | Associated Diagnosis | Comments             |
|                    | ty     |             |                      |                      |
+--------------------+--------+-------------+----------------------+----------------------+
| US ABDOMEN LIMITED | Routin | 04/15/2016  |   Right flank pain   |   Results for this   |
|                    | e      |  8:37 AM    |                      | procedure are in the |
|                    |        | PDT         |                      |  results section.    |
+--------------------+--------+-------------+----------------------+----------------------+
 documented in this encounter
 
 Results
 US Abdomen Limited (04/15/2016  8:37 AM PDT)
 
+----------+
| Specimen |
+----------+
|          |
+----------+
 
 
 
+------------------------------------------------------------------------+-----------------+
| Narrative                                                              | Performed At    |
+------------------------------------------------------------------------+-----------------+
|   EXAM: US ABDOMEN LIMITED dated 4/15/2016 8:06 AM     HISTORY: right  |   [facility]    |
| upper abdominal and flank pain     COMPARISON: None     FINDINGS:      | [facility]        |
| Liver: The liver is normal in size contour and echogenicity.   There   | [facility]  |
| is no  intrahepatic biliary ductal dilatation.        Gallbladder and  | - IMAGING       |
| bile ducts: The gallbladder is not distended.   No  cholelithiasis.    |                 |
|   No pericholecystic fluid.   The sonographic Hutchinson's reportedly      |                 |
| negative.   The gallbladder wall measures 1.8 mm.   The common bile    |                 |
| duct measures  4.4 mm.   The common hepatic duct measures 2.8 mm.      |                 |
| Pancreas: The pancreas as visualized is unremarkable.     Right        |                 |
| kidney: The right kidney as visualized is unremarkable.   There is no  |                 |
|  hydronephrosis.   The right kidney measures 13.1 cm longitudinally.   |                 |
|    Vascular: There is flow demonstrated in the sampled portions of the |                 |
|  inferior  vena cava, hepatic veins, portal veins, and the splenic     |                 |
| vein.   Portal venous  flow is toward the liver.     No visible        |                 |
| ascites.     IMPRESSION -      Unremarkable right upper quadrant       |                 |
| ultrasound.      Dictated and Signed by: Homer Vazquez MD           |                 |
| Electronically signed: 4/15/2016 9:12 AM                               |                 |
+------------------------------------------------------------------------+-----------------+
 
 
 
+------------------------------------------------------------------------------------------+
| Procedure Note                                                                           |
+------------------------------------------------------------------------------------------+
|   [facility], Rad Results In - 04/15/2016  9:15 AM PDT  EXAM: US ABDOMEN LIMITED dated          |
| 4/15/2016 8:06 AMHISTORY: right upper abdominal and flank painCOMPARISON:                |
| NoneFINDINGS:Liver: The liver is normal in size contour and echogenicity.  There is      |
 
| nointrahepatic biliary ductal dilatation.  Gallbladder and bile ducts: The gallbladder   |
| is not distended.  Nocholelithiasis.  No pericholecystic fluid.  The sonographic         |
| Hutchinson's reportedlynegative.  The gallbladder wall measures 1.8 mm.  The common bile     |
| duct measures4.4 mm.  The common hepatic duct measures 2.8 mm.Pancreas: The pancreas as  |
| visualized is unremarkable.Right kidney: The right kidney as visualized is unremarkable. |
|   There is nohydronephrosis.  The right kidney measures 13.1 cm longitudinally.Vascular: |
|  There is flow demonstrated in the sampled portions of the inferiorvena cava, hepatic    |
| veins, portal veins, and the splenic vein.  Portal venousflow is toward the liver.No     |
| visible ascites.IMPRESSION - Unremarkable right upper quadrant ultrasound. Dictated and  |
| Signed by: Homer Vazquez MD  Electronically signed: 4/15/2016 9:12 AM                 |
|negative.  The gallbladder wall measures 1.8 mm.  The common bile duct measures          |
|4.4 mm.  The common hepatic duct measures 2.8 mm.                                         |
|                                                                                          |
|Pancreas: The pancreas as visualized is unremarkable.                                     |
|                                                                                          |
|Right kidney: The right kidney as visualized is unremarkable.  There is no                |
|hydronephrosis.  The right kidney measures 13.1 cm longitudinally.                       |
|                                                                                          |
|Vascular: There is flow demonstrated in the sampled portions of the inferior              |
|vena cava, hepatic veins, portal veins, and the splenic vein.  Portal venous              |
|flow is toward the liver.                                                                 |
|                                                                                          |
|No visible ascites.                                                                       |
|                                                                                          |
|IMPRESSION -                                                                              |
|                                                                                          |
|Unremarkable right upper quadrant ultrasound.                                             |
|                                                                                          |
|Dictated and Signed by: Homer Vazquez MD                                               |
| Electronically signed: 4/15/2016 9:12 AM                                                 |
+------------------------------------------------------------------------------------------+
 
 
 
+----------------------+---------------------+--------------------+----------------+
| Performing           | Address             | City/State/Zipcode | Phone Number   |
| Organization         |                     |                    |                |
+----------------------+---------------------+--------------------+----------------+
|   [facility]     |   [address] |   [place]  |   [phone] |
| [facility]  |                     | [address]              |                |
| - IMAGING            |                     |                    |                |
+----------------------+---------------------+--------------------+----------------+
 documented in this encounter
 
 Visit Diagnoses
 Not on filedocumented in this encounter

## 2020-09-28 NOTE — XMS
Encounter Summary
  Created on: 2020
 
 Marie Fermin
 External Reference #: [id number]
 : [birth year]
 Sex: Female
 
 Demographics
 
 
+-----------------------+------------------------+
| Address               | [address]          |
|                       | [place]  [address]      |
+-----------------------+------------------------+
| Home Phone            | [phone]        |
+-----------------------+------------------------+
| Preferred Language    | Unknown                |
+-----------------------+------------------------+
| Marital Status        |                 |
+-----------------------+------------------------+
| Christianity Affiliation | Unknown                |
+-----------------------+------------------------+
| Race                  | White                  |
+-----------------------+------------------------+
| Ethnic Group          | Not  or  |
+-----------------------+------------------------+
 
 
 Author
 
 
+--------------+--------------------------------------------+
| Author       | [facility]  |
|              | and [place]                                |
+--------------+--------------------------------------------+
| Organization | [facility]  |
|              | and [place]                                |
+--------------+--------------------------------------------+
| Address      | Unknown                                    |
+--------------+--------------------------------------------+
| Phone        | Unavailable                                |
+--------------+--------------------------------------------+
 
 
 
 Support
 
 
+-----------------+--------------+---------+-----------------+
| Name            | Relationship | Address | Phone           |
+-----------------+--------------+---------+-----------------+
| Rodríguez Fermin | ECON         | Unknown | [phone] |
+-----------------+--------------+---------+-----------------+
 
 
 
 Care Team Providers
 
 
 
+-----------------------+------+-------------+
| Care Team Member Name | Role | Phone       |
+-----------------------+------+-------------+
 PCP  | Unavailable |
+-----------------------+------+-------------+
 
 
 
 Encounter Details
 
 
+--------+-----------+----------------------+--------------------+-------------+
| Date   | Type      | Department           | Care Team          | Description |
+--------+-----------+----------------------+--------------------+-------------+
| / | Hospital  |   [facility] |   Emmanuel Schilling   |             |
|    | Encounter |  MED CTR EMERGENCY   | MD [place]  [address]   |             |
|        |           | CENTER  [address] | [address]  [facility]   |             |
|        |           |   [place]    | [place] [address]    |             |
|        |           | [id number]           | [phone]       |             |
|        |           | [phone]         | [phone] (Fax) |             |
+--------+-----------+----------------------+--------------------+-------------+
 
 
 
 Social History
 
 
+----------------+-------+-----------+--------+------+
| Tobacco Use    | Types | Packs/Day | Years  | Date |
|                |       |           | Used   |      |
+----------------+-------+-----------+--------+------+
| Never Assessed |       |           |        |      |
+----------------+-------+-----------+--------+------+
 
 
 
+------------------+---------------+
| Sex Assigned at  | Date Recorded |
| Birth            |               |
+------------------+---------------+
| Not on file      |               |
+------------------+---------------+
 documented as of this encounter
 
 Plan of Treatment
 Not on filedocumented as of this encounter
 
 Visit Diagnoses
 Not on filedocumented in this encounter

## 2020-09-28 NOTE — XMS
Encounter Summary
  Created on: 2020
 
 Marie Fermin
 External Reference #: [id number]
 : [birth year]
 Sex: Female
 
 Demographics
 
 
+-----------------------+------------------------+
| Address               | [address]          |
|                       | [place]  [address]      |
+-----------------------+------------------------+
| Home Phone            | [phone]        |
+-----------------------+------------------------+
| Preferred Language    | Unknown                |
+-----------------------+------------------------+
| Marital Status        |                 |
+-----------------------+------------------------+
| Congregational Affiliation | Unknown                |
+-----------------------+------------------------+
| Race                  | White                  |
+-----------------------+------------------------+
| Ethnic Group          | Not  or  |
+-----------------------+------------------------+
 
 
 Author
 
 
+--------------+--------------------------------------------+
| Author       | [facility]  |
|              | and [place]                                |
+--------------+--------------------------------------------+
| Organization | [facility]  |
|              | and [place]                                |
+--------------+--------------------------------------------+
| Address      | Unknown                                    |
+--------------+--------------------------------------------+
| Phone        | Unavailable                                |
+--------------+--------------------------------------------+
 
 
 
 Support
 
 
+-----------------+--------------+---------+-----------------+
| Name            | Relationship | Address | Phone           |
+-----------------+--------------+---------+-----------------+
| Rodríguez Fermin | ECON         | Unknown | [phone] |
+-----------------+--------------+---------+-----------------+
 
 
 
 Care Team Providers
 
 
 
+-----------------------+------+-----------------+
| Care Team Member Name | Role | Phone           |
+-----------------------+------+-----------------+
| ROLDAN Horta NP   | PCP  | [phone] |
+-----------------------+------+-----------------+
 
 
 
 Encounter Details
 
 
+--------+-------------+---------------------+----------------------+-------------+
| Date   | Type        | Department          | Care Team            | Description |
+--------+-------------+---------------------+----------------------+-------------+
| / | Orders Only |   [facility]   |   Cody Ortiz,     |             |
| 2016   |             | [address]      | [place]  [address]   |             |
|        |             | [place]        | [address]          |             |
|        |             | [address]          | [place] [address]   |             |
|        |             | [phone]        | [phone]         |             |
|        |             |                     | [phone] (Fax)   |             |
+--------+-------------+---------------------+----------------------+-------------+
 
 
 
 Social History
 
 
+--------------------+-------+-----------+--------+------+
| Tobacco Use        | Types | Packs/Day | Years  | Date |
|                    |       |           | Used   |      |
+--------------------+-------+-----------+--------+------+
| Current Every Day  |       | 1         |        |      |
| Smoker             |       |           |        |      |
+--------------------+-------+-----------+--------+------+
 
 
 
+---------------------+---+---+---+
| Smokeless Tobacco:  |   |   |   |
| Never Used          |   |   |   |
+---------------------+---+---+---+
 
 
 
+-------------+-------------+---------+----------+
| Alcohol Use | Drinks/Week | oz/Week | Comments |
+-------------+-------------+---------+----------+
| Not Asked   |             |         |          |
+-------------+-------------+---------+----------+
 
 
 
+------------------+---------------+
| Sex Assigned at  | Date Recorded |
| Birth            |               |
+------------------+---------------+
| Not on file      |               |
+------------------+---------------+
 
 documented as of this encounter
 
 Plan of Treatment
 Not on filedocumented as of this encounter
 
 Procedures
 
 
+-------------------+--------+-------------+----------------------+----------------------+
| Procedure Name    | Priori | Date/Time   | Associated Diagnosis | Comments             |
|                   | ty     |             |                      |                      |
+-------------------+--------+-------------+----------------------+----------------------+
| EXTERNAL LAB: CBC | Routin | 2016  |                      |   Results for this   |
|                   | e      |  2:11 PM    |                      | procedure are in the |
|                   |        | PDT         |                      |  results section.    |
+-------------------+--------+-------------+----------------------+----------------------+
| COMPREHENSIVE     | Routin | 2016  |                      |   Results for this   |
| METABOLIC PANEL   | e      |  2:11 PM    |                      | procedure are in the |
|                   |        | PDT         |                      |  results section.    |
+-------------------+--------+-------------+----------------------+----------------------+
 documented in this encounter
 
 Results
 External Lab: CBC (2016  2:11 PM PDT)
 
+-------------+-----------+-----------------+------------+--------------+
| Component   | Value     | Ref Range       | Performed  | Pathologist  |
|             |           |                 | At         | Signature    |
+-------------+-----------+-----------------+------------+--------------+
| WBC         | 7.92      | 3.80 - 11.00    | EXTERNAL   |              |
|             |           | 10*3/uL         | LAB        |              |
+-------------+-----------+-----------------+------------+--------------+
| Non-Fetal   | 4.52      | 3.70 - 5.10     | EXTERNAL   |              |
| Red Blood   |           | 10*6/uL         | LAB        |              |
| Cells       |           |                 |            |              |
| Counted     |           |                 |            |              |
+-------------+-----------+-----------------+------------+--------------+
| Hemoglobin  | 13.9      | 11.3 - 15.5     | EXTERNAL   |              |
|             |           | g/dL            | LAB        |              |
+-------------+-----------+-----------------+------------+--------------+
| Hematocrit, | 40.7      | 34.0 - 46.0 %   | EXTERNAL   |              |
|  POC        |           |                 | LAB        |              |
+-------------+-----------+-----------------+------------+--------------+
| MCV         | 90.0      | 80.0 - 100.0 fL | EXTERNAL   |              |
|             |           |                 | LAB        |              |
+-------------+-----------+-----------------+------------+--------------+
| MCH         | 30.7      | 27.0 - 34.0 pg  | EXTERNAL   |              |
|             |           |                 | LAB        |              |
+-------------+-----------+-----------------+------------+--------------+
| MCHC        | 34.2      | 32.0 - 35.5     | EXTERNAL   |              |
|             |           | g/dL            | LAB        |              |
+-------------+-----------+-----------------+------------+--------------+
| RDW-CV      | 41.6      | 37 - 53 fL      | EXTERNAL   |              |
|             |           |                 | LAB        |              |
+-------------+-----------+-----------------+------------+--------------+
| Platelet    | 301       | 150 - 400       | EXTERNAL   |              |
| Count       |           | 10*3/uL         | LAB        |              |
| Plasma      |           |                 |            |              |
+-------------+-----------+-----------------+------------+--------------+
| MPV         | 7.9       | fL              | EXTERNAL   |              |
 
|             |           |                 | LAB        |              |
+-------------+-----------+-----------------+------------+--------------+
| Differentia | AUTOMATED |                 | EXTERNAL   |              |
| l Type      |           |                 | LAB        |              |
+-------------+-----------+-----------------+------------+--------------+
| % Segmented | 60.79     | %               | EXTERNAL   |              |
|             |           |                 | LAB        |              |
| Neutrophils |           |                 |            |              |
+-------------+-----------+-----------------+------------+--------------+
| %           | 31.94     | %               | EXTERNAL   |              |
| Lymphocytes |           |                 | LAB        |              |
+-------------+-----------+-----------------+------------+--------------+
| % Monocytes | 5.53      | %               | EXTERNAL   |              |
|             |           |                 | LAB        |              |
+-------------+-----------+-----------------+------------+--------------+
| %           | 0.96      | %               | EXTERNAL   |              |
| Eosinophils |           |                 | LAB        |              |
+-------------+-----------+-----------------+------------+--------------+
| % Basophils | 0.78      | %               | EXTERNAL   |              |
|             |           |                 | LAB        |              |
+-------------+-----------+-----------------+------------+--------------+
| Absolute    | 4.81      | 1.90 - 7.40     | EXTERNAL   |              |
| Segmented   |           | 10*3/uL         | LAB        |              |
| Neutrophils |           |                 |            |              |
+-------------+-----------+-----------------+------------+--------------+
| Absolute    | 2.53      | 1.00 - 3.90     | EXTERNAL   |              |
| Lymphocytes |           | 10*3/uL         | LAB        |              |
+-------------+-----------+-----------------+------------+--------------+
| Absolute    | 0.44      | 0.00 - 0.80     | EXTERNAL   |              |
| Monocytes   |           | 10*3/uL         | LAB        |              |
+-------------+-----------+-----------------+------------+--------------+
| Absolute    | 0.08      | 0.00 - 0.50     | EXTERNAL   |              |
| Eosinophils |           | 10*3/uL         | LAB        |              |
+-------------+-----------+-----------------+------------+--------------+
| Absolute    | 0.06      | 0.00 - 0.10     | EXTERNAL   |              |
| Basophils   |           | 10*3/uL         | LAB        |              |
+-------------+-----------+-----------------+------------+--------------+
 
 
 
+-----------------+
| Specimen        |
+-----------------+
| Blood specimen  |
| (specimen)      |
+-----------------+
 
 
 
+----------------+---------+--------------------+--------------+
| Performing     | Address | City/State/Zipcode | Phone Number |
| Organization   |         |                    |              |
+----------------+---------+--------------------+--------------+
|   EXTERNAL LAB |         |                    |              |
+----------------+---------+--------------------+--------------+
 Comprehensive Metabolic Panel (2016  2:11 PM PDT)
 
+-------------+--------------------------+-----------------+------------+--------------+
| Component   | Value                    | Ref Range       | Performed  | Pathologist  |
|             |                          |                 | At         | Signature    |
 
+-------------+--------------------------+-----------------+------------+--------------+
| Na          | 141                      | 135 - 145       | EXTERNAL   |              |
|             |                          | mmol/L          | LAB        |              |
+-------------+--------------------------+-----------------+------------+--------------+
| K           | 4.0                      | 3.5 - 4.9       | EXTERNAL   |              |
|             |                          | mmol/L          | LAB        |              |
+-------------+--------------------------+-----------------+------------+--------------+
| Cl          | 106                      | 99 - 109 mmol/L | EXTERNAL   |              |
|             |                          |                 | LAB        |              |
+-------------+--------------------------+-----------------+------------+--------------+
| CO2         | 28                       | 23 - 32 mmol/L  | EXTERNAL   |              |
|             |                          |                 | LAB        |              |
+-------------+--------------------------+-----------------+------------+--------------+
| Anion Gap   | 11                       | 5 - 20 mmol/L   | EXTERNAL   |              |
|             |                          |                 | LAB        |              |
+-------------+--------------------------+-----------------+------------+--------------+
| Glucose,    | 98                       | 65 - 99 mg/dL   | EXTERNAL   |              |
| Fasting     |                          |                 | LAB        |              |
+-------------+--------------------------+-----------------+------------+--------------+
| BUN         | 6 (L)                    | 8 - 25 mg/dL    | EXTERNAL   |              |
|             |                          |                 | LAB        |              |
+-------------+--------------------------+-----------------+------------+--------------+
| Creatinine  | 0.6                      | 0.50 - 1.00     | EXTERNAL   |              |
|             |                          | mg/dL           | LAB        |              |
+-------------+--------------------------+-----------------+------------+--------------+
| BUN/Creatin | 10                       |                 | EXTERNAL   |              |
| ine Ratio   |                          |                 | LAB        |              |
+-------------+--------------------------+-----------------+------------+--------------+
| Calcium     | 8.7                      | 8.5 - 10.5      | EXTERNAL   |              |
|             |                          | mg/dL           | LAB        |              |
+-------------+--------------------------+-----------------+------------+--------------+
| Protein,    | 6.7                      | 6.3 - 8.2 g/dL  | EXTERNAL   |              |
| Total       |                          |                 | LAB        |              |
+-------------+--------------------------+-----------------+------------+--------------+
| Albumin     | 3.8                      | 3.6 - 5.0 g/dL  | EXTERNAL   |              |
|             |                          |                 | LAB        |              |
+-------------+--------------------------+-----------------+------------+--------------+
| Globulin    | 2.9                      | 1.3 - 4.9 g/dL  | EXTERNAL   |              |
|             |                          |                 | LAB        |              |
+-------------+--------------------------+-----------------+------------+--------------+
| A/G Ratio   | 1.3                      | 1.0 - 2.4       | EXTERNAL   |              |
|             |                          |                 | LAB        |              |
+-------------+--------------------------+-----------------+------------+--------------+
| Bilirubin   | 0.3                      | 0.1 - 1.5 mg/dL | EXTERNAL   |              |
| Total       |                          |                 | LAB        |              |
+-------------+--------------------------+-----------------+------------+--------------+
| ALP,        | 97                       | 35 - 115 U/L    | EXTERNAL   |              |
| External    |                          |                 | LAB        |              |
+-------------+--------------------------+-----------------+------------+--------------+
| AST         | 23                       | 10 - 45 U/L     | EXTERNAL   |              |
|             |                          |                 | LAB        |              |
+-------------+--------------------------+-----------------+------------+--------------+
| ALT         | 33                       | 10 - 65 U/L     | EXTERNAL   |              |
|             |                          |                 | LAB        |              |
+-------------+--------------------------+-----------------+------------+--------------+
| Estimated   | >60Comment: GFR <60:     | mL/min/{1.73_m2 | EXTERNAL   |              |
| GFR         | CHRONIC KIDNEY DISEASE,  | }               | LAB        |              |
|             | IF FOUND OVER A 3 MONTH  |                 |            |              |
|             | PERIOD. GFR <15: KIDNEY  |                 |            |              |
|             | FAILURE. FOR      |                 |            |              |
 
|             | AMERICANS, MULTIPLY THE  |                 |            |              |
|             | CALCULATED GFR BY 1.210. |                 |            |              |
+-------------+--------------------------+-----------------+------------+--------------+
 
 
 
+-----------------+
| Specimen        |
+-----------------+
| Blood specimen  |
| (specimen)      |
+-----------------+
 
 
 
+----------------+---------+--------------------+--------------+
| Performing     | Address | City/State/Zipcode | Phone Number |
| Organization   |         |                    |              |
+----------------+---------+--------------------+--------------+
|   EXTERNAL LAB |         |                    |              |
+----------------+---------+--------------------+--------------+
 documented in this encounter
 
 Visit Diagnoses
 Not on filedocumented in this encounter

## 2020-09-28 NOTE — XMS
Encounter Summary
  Created on: 2020
 
 Marie Fermin
 External Reference #: [id number]
 : [birth year]
 Sex: Female
 
 Demographics
 
 
+-----------------------+------------------------+
| Address               | [address]          |
|                       | [place]  [address]      |
+-----------------------+------------------------+
| Home Phone            | [phone]        |
+-----------------------+------------------------+
| Preferred Language    | Unknown                |
+-----------------------+------------------------+
| Marital Status        |                 |
+-----------------------+------------------------+
| [employer] Affiliation | Unknown                |
+-----------------------+------------------------+
| Race                  | White                  |
+-----------------------+------------------------+
| Ethnic Group          | Not  or  |
+-----------------------+------------------------+
 
 
 Author
 
 
+--------------+--------------------------------------------+
| Author       | [facility]  |
|              | and [place]                                |
+--------------+--------------------------------------------+
| Organization | [facility]  |
|              | and [place]                                |
+--------------+--------------------------------------------+
| Address      | Unknown                                    |
+--------------+--------------------------------------------+
| Phone        | Unavailable                                |
+--------------+--------------------------------------------+
 
 
 
 Support
 
 
+-----------------+--------------+---------+-----------------+
| Name            | Relationship | Address | Phone           |
+-----------------+--------------+---------+-----------------+
| Rodríguez Fermin | ECON         | Unknown | [phone] |
+-----------------+--------------+---------+-----------------+
 
 
 
 Care Team Providers
 
 
 
+-----------------------+------+-------------+
| Care Team Member Name | Role | Phone       |
+-----------------------+------+-------------+
| No, Physician         | PCP  | Unavailable |
+-----------------------+------+-------------+
 
 
 
 Reason for Visit
 
 
+------------------+--------+----------+
| Reason           | Onset  | Comments |
|                  | Date   |          |
+------------------+--------+----------+
| Results, Imaging | / |          |
|                  |    |          |
+------------------+--------+----------+
 
 
 
 Encounter Details
 
 
+--------+-----------+----------------------+----------------------+------------------+
| Date   | Type      | Department           | Care Team            | Description      |
+--------+-----------+----------------------+----------------------+------------------+
| / | Telephone |   [facility]   |   Braxton Trujillo | Results, Imaging |
| 2016   |           | CARE  [address] |  [facility]  [address]   |                  |
|        |           |   [place]    | [address]  [place] |                  |
|        |           | [address]           |  [address]  [phone] |                  |
|        |           | [phone]         |   [phone] (Fax) |                  |
+--------+-----------+----------------------+----------------------+------------------+
 
 
 
 Social History
 
 
+--------------------+-------+-----------+--------+------+
| Tobacco Use        | Types | Packs/Day | Years  | Date |
|                    |       |           | Used   |      |
+--------------------+-------+-----------+--------+------+
| Current Every Day  |       | 1         |        |      |
| Smoker             |       |           |        |      |
+--------------------+-------+-----------+--------+------+
 
 
 
+---------------------+---+---+---+
| Smokeless Tobacco:  |   |   |   |
| Never Used          |   |   |   |
+---------------------+---+---+---+
 
 
 
+-------------+-------------+---------+----------+
| Alcohol Use | Drinks/Week | oz/Week | Comments |
 
+-------------+-------------+---------+----------+
| Not Asked   |             |         |          |
+-------------+-------------+---------+----------+
 
 
 
+------------------+---------------+
| Sex Assigned at  | Date Recorded |
| Birth            |               |
+------------------+---------------+
| Not on file      |               |
+------------------+---------------+
 documented as of this encounter
 
 Miscellaneous Notes
 Telephone Encounter - Gasper Escobar CMA - 2016  3:11 PM PDTPatient came to clinic t
o get clarifications on what Karina had told her over the phone per Dr. Trujillo. So I diane
nted out the message from Dr. Trujillo and took patient to triage room and had her read 
it so it would make better sense to her. She still had lots of questions and asked for her I
ramin report. I had her sign a consent form to release that report to her and I gave it to 
her. She still has questions so I suggested that she would be seen by one of our Doctors tod
ay that can  her and possibly give her an excuse from work if appropriate. She agreed
 with the plan and is going to take her mom home and come back to the clinic and check in. ELAINA clemente was very thankful for my help. Electronically signed by Gasper Escobar CMA at 
016  3:16 PM PDTdocumented in this encounter
 
 Plan of Treatment
 Not on filedocumented as of this encounter
 
 Visit Diagnoses
 Not on filedocumented in this encounter

## 2020-09-28 NOTE — XMS
Encounter Summary
  Created on: 2020
 
 Marie Fermin
 External Reference #: [id number]
 : [birth year]
 Sex: Female
 
 Demographics
 
 
+-----------------------+------------------------+
| Address               | [address]          |
|                       | [place]  [address]      |
+-----------------------+------------------------+
| Home Phone            | [phone]        |
+-----------------------+------------------------+
| Preferred Language    | Unknown                |
+-----------------------+------------------------+
| Marital Status        |                 |
+-----------------------+------------------------+
| Hindu Affiliation | Unknown                |
+-----------------------+------------------------+
| Race                  | White                  |
+-----------------------+------------------------+
| Ethnic Group          | Not  or  |
+-----------------------+------------------------+
 
 
 Author
 
 
+--------------+--------------------------------------------+
| Author       | [facility]  |
|              | and [place]                                |
+--------------+--------------------------------------------+
| Organization | [facility]  |
|              | and [place]                                |
+--------------+--------------------------------------------+
| Address      | Unknown                                    |
+--------------+--------------------------------------------+
| Phone        | Unavailable                                |
+--------------+--------------------------------------------+
 
 
 
 Support
 
 
+-----------------+--------------+---------+-----------------+
| Name            | Relationship | Address | Phone           |
+-----------------+--------------+---------+-----------------+
| Rodríguez Fermin | ECON         | Unknown | [phone] |
+-----------------+--------------+---------+-----------------+
 
 
 
 Care Team Providers
 
 
 
+-----------------------+------+-------------+
| Care Team Member Name | Role | Phone       |
+-----------------------+------+-------------+
 PCP  | Unavailable |
+-----------------------+------+-------------+
 
 
 
 Encounter Details
 
 
+--------+-----------+----------------------+-----------+-------------+
| Date   | Type      | Department           | Care Team | Description |
+--------+-----------+----------------------+-----------+-------------+
| / | Hospital  |   [facility] |           |             |
|    | Encounter |  MED CTR XRAY  [address] |           |             |
|        |           |  [address]  [place]       |           |             |
|        |           | [place] [address] |           |             |
|        |           |   [phone]       |           |             |
+--------+-----------+----------------------+-----------+-------------+
 
 
 
 Social History
 
 
+----------------+-------+-----------+--------+------+
| Tobacco Use    | Types | Packs/Day | Years  | Date |
|                |       |           | Used   |      |
+----------------+-------+-----------+--------+------+
| Never Assessed |       |           |        |      |
+----------------+-------+-----------+--------+------+
 
 
 
+------------------+---------------+
| Sex Assigned at  | Date Recorded |
| Birth            |               |
+------------------+---------------+
| Not on file      |               |
+------------------+---------------+
 documented as of this encounter
 
 Plan of Treatment
 Not on filedocumented as of this encounter
 
 Visit Diagnoses
 Not on filedocumented in this encounter

## 2020-09-28 NOTE — XMS
Encounter Summary
  Created on: 2020
 
 Marie Fermin
 External Reference #: [id number]
 : [birth year]
 Sex: Female
 
 Demographics
 
 
+-----------------------+------------------------+
| Address               | [address]          |
|                       | [place]  [address]      |
+-----------------------+------------------------+
| Home Phone            | [phone]        |
+-----------------------+------------------------+
| Preferred Language    | Unknown                |
+-----------------------+------------------------+
| Marital Status        |                 |
+-----------------------+------------------------+
| Latter-day Affiliation | Unknown                |
+-----------------------+------------------------+
| Race                  | White                  |
+-----------------------+------------------------+
| Ethnic Group          | Not  or  |
+-----------------------+------------------------+
 
 
 Author
 
 
+--------------+--------------------------------------------+
| Author       | [facility]  |
|              | and [place]                                |
+--------------+--------------------------------------------+
| Organization | [facility]  |
|              | and [place]                                |
+--------------+--------------------------------------------+
| Address      | Unknown                                    |
+--------------+--------------------------------------------+
| Phone        | Unavailable                                |
+--------------+--------------------------------------------+
 
 
 
 Support
 
 
+-----------------+--------------+---------+-----------------+
| Name            | Relationship | Address | Phone           |
+-----------------+--------------+---------+-----------------+
| Rodríguez Fermin | ECON         | Unknown | [phone] |
+-----------------+--------------+---------+-----------------+
 
 
 
 Care Team Providers
 
 
 
+-----------------------+------+-------------+
| Care Team Member Name | Role | Phone       |
+-----------------------+------+-------------+
| No, Physician         | PCP  | Unavailable |
+-----------------------+------+-------------+
 
 
 
 Reason for Visit
 
 
+-----------------+----------+
| Reason          | Comments |
+-----------------+----------+
| Neck Injury     |          |
+-----------------+----------+
| Shoulder Injury |          |
+-----------------+----------+
 
 
 
 Encounter Details
 
 
+--------+---------+----------------------+---------------------+----------------------+
| Date   | Type    | Department           | Care Team           | Description          |
+--------+---------+----------------------+---------------------+----------------------+
| 12/10/ | Office  |   [facility]          |   Cathi Osorio  | Cervical strain,     |
|    | Visit   | [facility]  | MD Becky  [address]     | subsequent encounter |
|        |         | [address]  [place]   |  (Primary Dx); Place |
|        |         | [address]  [place] | [place] [address]     |  of occurrence,      |
|        |         |  [place]           | [phone]        | industrial places    |
|        |         | [address]           | [phone] (Fax)  | and premises;        |
|        |         | [phone]         |                     | Civilian activity    |
|        |         |                      |                     | done for income or   |
|        |         |                      |                     | pay                  |
+--------+---------+----------------------+---------------------+----------------------+
 
 
 
 Social History
 
 
+--------------------+-------+-----------+--------+------+
| Tobacco Use        | Types | Packs/Day | Years  | Date |
|                    |       |           | Used   |      |
+--------------------+-------+-----------+--------+------+
| Current Every Day  |       | 1         |        |      |
| Smoker             |       |           |        |      |
+--------------------+-------+-----------+--------+------+
 
 
 
+---------------------+---+---+---+
| Smokeless Tobacco:  |   |   |   |
| Never Used          |   |   |   |
+---------------------+---+---+---+
 
 
 
 
+-------------+-------------+---------+----------+
| Alcohol Use | Drinks/Week | oz/Week | Comments |
+-------------+-------------+---------+----------+
| Not Asked   |             |         |          |
+-------------+-------------+---------+----------+
 
 
 
+------------------+---------------+
| Sex Assigned at  | Date Recorded |
| Birth            |               |
+------------------+---------------+
| Not on file      |               |
+------------------+---------------+
 documented as of this encounter
 
 Last Filed Vital Signs
 
 
+-------------------+-------------------+----------------------+----------+
| Vital Sign        | Reading           | Time Taken           | Comments |
+-------------------+-------------------+----------------------+----------+
| Blood Pressure    | 120/82            | 12/10/2014  3:10 PM  |          |
|                   |                   | PST                  |          |
+-------------------+-------------------+----------------------+----------+
| Pulse             | 70                | 12/10/2014  3:10 PM  |          |
|                   |                   | PST                  |          |
+-------------------+-------------------+----------------------+----------+
| Temperature       | 36.7   C (98   F) | 12/10/2014  3:10 PM  |          |
|                   |                   | PST                  |          |
+-------------------+-------------------+----------------------+----------+
| Respiratory Rate  | 16                | 12/10/2014  3:10 PM  |          |
|                   |                   | PST                  |          |
+-------------------+-------------------+----------------------+----------+
| Oxygen Saturation | -                 | -                    |          |
+-------------------+-------------------+----------------------+----------+
| Inhaled Oxygen    | -                 | -                    |          |
| Concentration     |                   |                      |          |
+-------------------+-------------------+----------------------+----------+
| Weight            | 103.4 kg (228 lb) | 12/10/2014  3:10 PM  |          |
|                   |                   | PST                  |          |
+-------------------+-------------------+----------------------+----------+
| Height            | 165.1 cm (5' 5")  | 12/10/2014  3:10 PM  |          |
|                   |                   | PST                  |          |
+-------------------+-------------------+----------------------+----------+
| Body Mass Index   | 37.94             | 12/10/2014  3:10 PM  |          |
|                   |                   | PST                  |          |
+-------------------+-------------------+----------------------+----------+
 documented in this encounter
 
 Progress Notes
 Cathi Osorio MD - 12/10/2014  3:32 PM PSTEmployer: [employer]
 Guarantor: [employer]
 Date of injury: 14
 Claim number: [id number]
 
 Chief complaint: Followup neck strain
 
 
 Subjective:
 Patient is a 39-year-old female who presents today for scheduled followup.  Since her last 
visit she has been to the car [place] had several sessions.  With the initial manipulation s
he felt spontaneous complete resolve of her headaches.  About one to 2 hours after the initi
al adjustment she felt increased tightness in the muscles of the posterior neck but had no r
eturn of headache.  After the last few visits she has had complete resolution of her headach
es, no neck pain or pain in the back.  She has a few remaining sessions.  No numbness, tingl
ing, or weakness of the extremities.  Overall feels quite well and is without complaints at 
this time.  She reports no new symptoms, complaints, or injuries.
 
 Past medical history, medications, allergies reviewed
 
 Review of systems:
 As per HPI
 
 Objective:
 Vital signs as noted, nursing notes reviewed.
 General-well-developed, well-nourished, in no apparent distress, pleasant cooperative.
 Neck-normal to inspection.  No paraspinous muscle spasticity.  No vertebral point tendernes
s, crepitus, or step-off.  No tender points elicited with palpation.  Full flexion, extensio
n, and lateral rotation without pain or limitation.  Negative Spurling testing bilaterally.
 Neuro--Motor strength 5/5 bilat upper extremities
 DTR's biceps,triceps, brachioradialis 2+/4 bilat
 Sensation grossly intact to light touch bilat upper extremities
 
 Imaging/diagnostics:
 None indicated this visit
 
 Pending interventions:
 Continue conservative management.
 
 Assessment:
 1.  Cervical strain
 
 Plan:
 Continue with approved to chiropractic sessions.  Followup in 2-3 weeks to recheck, after s
he has completed her sessions to reassess and for probable claims closure.  She will return 
sooner for acute worsening or other concerns.
 
 E: Regular duty
 
 R: None
 
 Impairment undetermined at this point in time, based on current objective findings it is no
t an anticipated consequence of this injury however patient has not yet reached MMI status.
 
 This note was dictated using dragon voice recognition software.  Occasional wrong- word or 
sound-alike substitutions may have occurred due to the inherent limitations of voice recogni
tion software.  Please read the chart carefully and recognize, using context, where these scott
bstitutions have occurred. 
 
 Electronically signed by Cathi Osorio MD at 12/10/2014  3:35 PM PSTdocumented in th
is encounter
 
 Miscellaneous Notes
 Plan of Care - ONBASE SCAN WAMT - 12/10/2014 12:00 AM PSTElectronically signed by ROLDAN Rabago at 2014  2:40 PM PSTdocumented in this encounter
 
 Plan of Treatment
 Not on filedocumented as of this encounter
 
 
 Visit Diagnoses
 
 
+-------------------------------------------------------+
| Diagnosis                                             |
+-------------------------------------------------------+
|   Cervical strain, subsequent encounter - Primary     |
+-------------------------------------------------------+
|   Place of occurrence, industrial places and premises |
+-------------------------------------------------------+
|   Civilian activity done for income or pay            |
+-------------------------------------------------------+
 documented in this encounter

## 2020-09-28 NOTE — XMS
Encounter Summary
  Created on: 2020
 
 Marie Fermin
 External Reference #: [id number]
 : [birth year]
 Sex: Female
 
 Demographics
 
 
+-----------------------+------------------------+
| Address               | [address]          |
|                       | [place]  [address]      |
+-----------------------+------------------------+
| Home Phone            | [phone]        |
+-----------------------+------------------------+
| Preferred Language    | Unknown                |
+-----------------------+------------------------+
| Marital Status        |                 |
+-----------------------+------------------------+
| Roman Catholic Affiliation | Unknown                |
+-----------------------+------------------------+
| Race                  | White                  |
+-----------------------+------------------------+
| Ethnic Group          | Not  or  |
+-----------------------+------------------------+
 
 
 Author
 
 
+--------------+--------------------------------------------+
| Author       | [facility]  |
|              | and [place]                                |
+--------------+--------------------------------------------+
| Organization | [facility]  |
|              | and [place]                                |
+--------------+--------------------------------------------+
| Address      | Unknown                                    |
+--------------+--------------------------------------------+
| Phone        | Unavailable                                |
+--------------+--------------------------------------------+
 
 
 
 Support
 
 
+-----------------+--------------+---------+-----------------+
| Name            | Relationship | Address | Phone           |
+-----------------+--------------+---------+-----------------+
| Rodríguez Fermin | ECON         | Unknown | [phone] |
+-----------------+--------------+---------+-----------------+
 
 
 
 Care Team Providers
 
 
 
+-----------------------+------+-------------+
| Care Team Member Name | Role | Phone       |
+-----------------------+------+-------------+
 PCP  | Unavailable |
+-----------------------+------+-------------+
 
 
 
 Encounter Details
 
 
+--------+-----------+----------------------+-----------+-------------+
| Date   | Type      | Department           | Care Team | Description |
+--------+-----------+----------------------+-----------+-------------+
| / | Hospital  |   [facility] |           |             |
|    | Encounter |  MED CTR XRAY  [address] |           |             |
|        |           |  [address]  [place]       |           |             |
|        |           | [place] [address] |           |             |
|        |           |   [phone]       |           |             |
+--------+-----------+----------------------+-----------+-------------+
 
 
 
 Social History
 
 
+----------------+-------+-----------+--------+------+
| Tobacco Use    | Types | Packs/Day | Years  | Date |
|                |       |           | Used   |      |
+----------------+-------+-----------+--------+------+
| Never Assessed |       |           |        |      |
+----------------+-------+-----------+--------+------+
 
 
 
+------------------+---------------+
| Sex Assigned at  | Date Recorded |
| Birth            |               |
+------------------+---------------+
| Not on file      |               |
+------------------+---------------+
 documented as of this encounter
 
 Plan of Treatment
 Not on filedocumented as of this encounter
 
 Visit Diagnoses
 Not on filedocumented in this encounter

## 2020-09-28 NOTE — XMS
Encounter Summary
  Created on: 2020
 
 Marie Fermin
 External Reference #: [id number]
 : [birth year]
 Sex: Female
 
 Demographics
 
 
+-----------------------+------------------------+
| Address               | [address]          |
|                       | [place]  [address]      |
+-----------------------+------------------------+
| Home Phone            | [phone]        |
+-----------------------+------------------------+
| Preferred Language    | Unknown                |
+-----------------------+------------------------+
| Marital Status        |                 |
+-----------------------+------------------------+
| Bahai Affiliation | Unknown                |
+-----------------------+------------------------+
| Race                  | White                  |
+-----------------------+------------------------+
| Ethnic Group          | Not  or  |
+-----------------------+------------------------+
 
 
 Author
 
 
+--------------+--------------------------------------------+
| Author       | [facility]  |
|              | [facility] [place]                                |
+--------------+--------------------------------------------+
| Organization | [facility]  |
|              | [facility] [place]                                |
+--------------+--------------------------------------------+
| Address      | Unknown                                    |
+--------------+--------------------------------------------+
| Phone        | Unavailable                                |
+--------------+--------------------------------------------+
 
 
 
 Support
 
 
+-----------------+--------------+---------+-----------------+
| Name            | Relationship | Address | Phone           |
+-----------------+--------------+---------+-----------------+
| Rodríguez Fermin | ECON         | Unknown | [phone] |
+-----------------+--------------+---------+-----------------+
 
 
 
 Care Team Providers
 
 
 
+-----------------------+------+-------------+
| Care Team Member Name | Role | Phone       |
+-----------------------+------+-------------+
| No, Physician         | PCP  | Unavailable |
+-----------------------+------+-------------+
 
 
 
 Reason for Visit
 
 
+---------------+----------------------------------------------------------------+
| Reason        | Comments                                                       |
+---------------+----------------------------------------------------------------+
| Tailbone Pain | Room 3   A pilonidal cyst on tailbone opened up spontaneously  |
|               | this am. Draining.                                             |
+---------------+----------------------------------------------------------------+
 
 
 
 Encounter Details
 
 
+--------+---------+----------------------+----------------------+--------------------+
| Date   | Type    | Department           | Care Team            | Description        |
+--------+---------+----------------------+----------------------+--------------------+
| / | Office  |   [facility]   |   Braxton Trujillo | Pilonidal abscess  |
| 2016   | Visit   | CARE  [address] |  MD ROGER  [address]   | (Primary Dx)       |
|        |         |   [place]    | [address]  [place], [place] |                    |
|        |         | [id number]           |  [address] [phone] |                    |
|        |         | [phone]         |   [phone] (Fax) |                    |
+--------+---------+----------------------+----------------------+--------------------+
 
 
 
 Social History
 
 
+--------------------+-------+-----------+--------+------+
| Tobacco Use        | Types | Packs/Day | Years  | Date |
|                    |       |           | Used   |      |
+--------------------+-------+-----------+--------+------+
| Current Every Day  |       | 1         |        |      |
| Smoker             |       |           |        |      |
+--------------------+-------+-----------+--------+------+
 
 
 
+---------------------+---+---+---+
| Smokeless Tobacco:  |   |   |   |
| Never Used          |   |   |   |
+---------------------+---+---+---+
 
 
 
+-------------+-------------+---------+----------+
| Alcohol Use | Drinks/Week | oz/Week | Comments |
+-------------+-------------+---------+----------+
 
| Not Asked   |             |         |          |
+-------------+-------------+---------+----------+
 
 
 
+------------------+---------------+
| Sex Assigned at  | Date Recorded |
| Birth            |               |
+------------------+---------------+
| Not on file      |               |
+------------------+---------------+
 documented as of this encounter
 
 Last Filed Vital Signs
 
 
+-------------------+---------------------+----------------------+----------+
| Vital Sign        | Reading             | Time Taken           | Comments |
+-------------------+---------------------+----------------------+----------+
| Blood Pressure    | 116/59              | 2016  1:42 PM  |          |
|                   |                     | PST                  |          |
+-------------------+---------------------+----------------------+----------+
| Pulse             | 106                 | 2016  1:42 PM  |          |
|                   |                     | PST                  |          |
+-------------------+---------------------+----------------------+----------+
| Temperature       | 36.8   C (98.3   F) | 2016  1:42 PM  |          |
|                   |                     | PST                  |          |
+-------------------+---------------------+----------------------+----------+
| Respiratory Rate  | 16                  | 2016  1:42 PM  |          |
|                   |                     | PST                  |          |
+-------------------+---------------------+----------------------+----------+
| Oxygen Saturation | 95%                 | 2016  1:42 PM  |          |
|                   |                     | PST                  |          |
+-------------------+---------------------+----------------------+----------+
| Inhaled Oxygen    | -                   | -                    |          |
| Concentration     |                     |                      |          |
+-------------------+---------------------+----------------------+----------+
| Weight            | 102.1 kg (225 lb)   | 2016  1:42 PM  |          |
|                   |                     | PST                  |          |
+-------------------+---------------------+----------------------+----------+
| Height            | 165.1 cm (5' 5")    | 2016  1:42 PM  |          |
|                   |                     | PST                  |          |
+-------------------+---------------------+----------------------+----------+
| Body Mass Index   | 37.44               | 2016  1:42 PM  |          |
|                   |                     | PST                  |          |
+-------------------+---------------------+----------------------+----------+
 documented in this encounter
 
 Patient Instructions
 Patient Instructions Braxton Trujillo MD - 2016  2:13 PM PSTKeep your wound demetrius
n and dressed.
 Clean wound with soap and water, pat dry and change dressing daily.
 Leave the gauze wick in place if possible for the next 3 days before removing at home with 
dressing change.
 Use Epsom salts sitz baths twice daily to enhance drainage until healed.
 Take Bactrim DS one tablet with tall glass of water twice daily for 10 days.
 We will contact you if wound culture reveals bacteria unresponsive to Bactrim.
 Use Tylenol, Advil or Aleve with food as needed for discomfort.
 Return if any signs of progressive infection develop such as increased redness, pain, warmt
h or swelling or should you have any other concerns.
 
 
 Pilonidal Cyst, Infected (Incision & Drainage)
 A pilonidal cyst is a swelling that starts under the skin on the sacrum near the tail bone.
 It is present at birth and may look like a small dimple. It can fill with skin oils, hair, 
and dead skin cells,and may stay small or grow larger. Because it often has an opening to 
the surface, it may become infected with normal skin bacteria. A pilonidal cyst is different
 than a hemorrhoid.
 Cause
 Thecause of pilonidal cysts has been debated since they were first recognized. It may be 
present at birth and go unnoticed. It may appear like a small dimple. Injury, rubbing, or sk
in irritation may also cause pilonidal cysts. it can also be caused by an ingrown hair. Most
 likely, the cause is a combination of these things. Becausesome injury or irritation can 
lead to pilonidal cysts, it can be more common in peoplewho sit or drive a lot for work.
 Symptoms
 A pilonidal cyst may be small and painless. If symptoms occur, they may include:
  Swelling
  Irritation or redness
  Pain
  Drainage
 The cyst can swell and drain on its own. The swelling and drainage can come and go.
 Treatment
 If the infection is limited, it may be treated with antibiotics alone. If the infection is 
more severe or gets worse, it needs to be drained. Often, this can be done with a small inci
sionusing local anesthesia. In some cases, surgery is needed to treat it or prevent it fro
m returning.
 After the incision and drainage,gauze packing may be inserted into the opening. If so, it
 should be removed in 1 to 2 days. Antibiotics are not required in the treatment of a simple
 abscess, unless the infection is spreading into the skin around the wound. The wound will t
galina about 1 to 2 weeks to heal depending on the size of the cyst.
 Home care
 Wound care
  Pus may drain from the wound for the first few days. Cover the wound with a clean dry ba
ndage. Change the bandage if it becomes soaked with blood or pus, or if it gets soiled with 
feces or urine.
  Sit in a tub filled with about 6 inches of hot water. Keep the water hot for 10 to15 m
inutes.
  Ifgauze packing was placed inside the cyst cavity, you may be advised to remove it you
rself. You may do this in the shower. Once the packing is removed, you should wash the area 
carefully in the shower once a day, until the skin opening has closed. It is okay to direct 
the shower spray directly into the opening if this is not too painful.
 Medications
  Take acetaminophen or ibuprofen for pain, unless you were given a different pain medicin
e to use. If you have chronic liver or kidney disease or have every had a stomach ulcer or g
astrointestinal bleeding, or are taking blood thinner medications, talk with your doctor bef
ore using these medications.
  If you were given antibiotics, take them until they are gone. To make sure the infection
 has cleared, it is important to finish the antibiotics even if the wound looks better.
  Use antibiotic cream or ointment.
 Prevention
 Once this infection has healed, the following may decrease the risk of future infections:
  Keep the area of the cyst clean by bathing or showering daily.
  Avoid tight-fitting clothing to minimize perspiration and irritation of the skin.
  Recurrent pilonidal cysts may be completely removed by surgery. But this can only be don
e at a time when there is no infection. Ask your doctor for more information.
 Follow-up care
 Follow up with your doctor as advised by our staff. If a gauze packing was inserted in your
 wound, it should be removed in 1 to 2 days. Check your wound every day for the signs listed
 below.
 When to seek medical care
 Get prompt medical attention if any of the following occur:
 
  Pus continues to come from the cyst for5 days after the incision
  Increasing redness, local pain, or swelling
  Fever over 100.4F (38.0C) for more than 2 days
  [id number] [employer]. [address] [place] [address]. All righ
ts reserved. This information is not intended as a substitute for professional medical care.
 Always follow your healthcare professional's instructions.
 
 Electronically signed by Braxton Trujillo MD at 2016  2:35 PM PST
 documented in this encounter
 
 Progress Notes
 Karina Enriquez Cert MA - 2016  2:46 PM PSTDressed wound with non stick dressing and 4
x4 followed by cover roll stretch. Pt ws given extra supplies.
 Verified name and date of birth of patient before provider orders were carried out.
 Electronically signed by Lakeisha Keith MA at 2016 11:13 PM Braxton Schultz MD - 2016  1:58 PM PSTFormatting of this note might be different from the original
.
 Subjective: 
  
 Chief Complaint: Tailbone Pain
  
 
 Marie is a 41 y.o. female who comes in complaining of painful lump on the upper buttocks ov
er the lower sacrum for the last 5 weeks.  No known injury or inciting event.  She was seen 
on  here for an early pilonidal cyst/abscess which was treated with Keflex.  No I&D
 was performed given the small size.  Pt. has treated it with a 10 day course of Keflex with
out significant benefit.  No fevers, chills or other systemic symptoms. Marie has not had th
is previously,  denies prior I&D.  No hx of MRSA.  Denies history of diabetes and immunosupp
ressive therapy.  No other complaints.
 
 Patient's medications, allergies, past medical, surgical, social and family histories were 
reviewed and updated as appropriate.
 
  
 Objective: 
 
 /59 mmHg | Pulse 106 | Temp(Src) 36.8 C (98.3 F) (Temporal) | Resp 16 | Ht 1.651 
m (5' 5") | Wt 102.059 kg (225 lb) | BMI 37.44 kg/m2 | SpO2 95% | LMP 2016 (Exact Date
)
 
 General Appearance:  Alert, cooperative, no distress, appears stated age.
 Skin: With 3 cm area of induration on upper intergluteal crease, with 4 cm diameter surroun
ding erythema, edema and tenderness.  There is some flaking of skin at the center without op
en sore or drainage.  Skin is otherwise normal.  No adenopathy.
 
 Written consent for I&D was obtained.
 
 Procedure:  Skin was cleansed with betadine scrub and alcohol and locally anesthetized with
 4 cc of lidocaine 1% with Epinephrine. Lesion incised and drained of thick, yellow pus usin
g an 11 blade scalpel.  Culture was of initial abscess and drainage material was obtained.  
6 cm of iodoform gauze wick was placed. Sterile dressing applied.  She tolerated this well w
ithout complication.
 
 Assessment and Plans: 
 
 1. Pilonidal abscess  sulfamethoxazole-trimethoprim (BACTRIM DS) 800-160 mg per tablet 
  Culture, Wound, Smear 
 
 Upper intergluteal cutaneous abscess in location of possible pilonidal cyst though appearan
ce of drained material and history do not suggest chronic pilonidal cyst.  She was given the
 
 following instructions.  Given that this is the first time this has ever happened, I did no
t refer her for surgical evaluation for cyst resection.
 
 Plan:
 
 Keep your wound clean and dressed.
 Clean wound with soap and water, pat dry and change dressing daily.
 Leave the gauze wick in place if possible for the next 3 days before removing.
 Use absence salts sitz baths twice daily to enhance drainage until healed.
 Take Bactrim DS one tablet with tall glass of water twice daily for 10 days.
 Use Tylenol, Advil or Aleve with food as needed for discomfort.
 Return if any signs of progressive infection develop such as increased redness, pain, warmt
h or swelling or should you have any other concerns.
 
 Braxton Monte
 Electronically signed by Braxton Trujillo MD at 2016 11:13 PM PSTdocumented in th
is encounter
 
 Plan of Treatment
 Not on filedocumented as of this encounter
 
 Procedures
 
 
+------------------+--------+-------------+----------------------+----------------------+
| Procedure Name   | Priori | Date/Time   | Associated Diagnosis | Comments             |
|                  | ty     |             |                      |                      |
+------------------+--------+-------------+----------------------+----------------------+
| CULTURE, WOUND,  | Routin | 2016  |   Pilonidal abscess  |   Results for this   |
| SMEAR            | e      |  2:37 PM    |                      | procedure are in the |
|                  |        | PST         |                      |  results section.    |
+------------------+--------+-------------+----------------------+----------------------+
 documented in this encounter
 
 Results
 Culture, Wound, Smear (2016  2:37 PM PST)
 
+-------------+-------------------------+-----------+-------------+--------------+
| Component   | Value                   | Ref Range | Performed   | Pathologist  |
|             |                         |           | At          | Signature    |
+-------------+-------------------------+-----------+-------------+--------------+
| Culture     | Culture in progress...  |           | [facility]  |              |
|             |                         |           | [facility]    |              |
|             |                         |           | [facility]     |              |
|             |                         |           | [facility] -    |              |
|             |                         |           | LABORATORY  |              |
+-------------+-------------------------+-----------+-------------+--------------+
| Culture     | 1+ Arcanobacterium      |           | [facility]  |              |
|             | species                 |           | [facility]    |              |
|             |                         |           | [facility]     |              |
|             |                         |           | [facility] -    |              |
|             |                         |           | LABORATORY  |              |
+-------------+-------------------------+-----------+-------------+--------------+
| Culture     | 1+ Usual FloraComment:  |           | [facility]  |              |
|             | Consistent with skin    |           | [facility]    |              |
|             | [facility]                  |           | MEDICAL     |              |
|             |                         |           | CENTER -    |              |
|             |                         |           | LABORATORY  |              |
+-------------+-------------------------+-----------+-------------+--------------+
| Gram Stain  | 3+ White Blood Cells    |           | [facility]  |              |
 
| Result      |                         |           | [facility]    |              |
|             |                         |           | [facility]     |              |
|             |                         |           | CENTER -    |              |
|             |                         |           | LABORATORY  |              |
+-------------+-------------------------+-----------+-------------+--------------+
| Gram Stain  | 3+ Gram positive cocci  |           | [facility]  |              |
| Result      |                         |           | [facility]    |              |
|             |                         |           | [facility]     |              |
|             |                         |           | C[facility] -    |              |
|             |                         |           | LABORATORY  |              |
+-------------+-------------------------+-----------+-------------+--------------+
 
 
 
+----------------------+
| Specimen             |
+----------------------+
| Wound - Pus specimen |
|  (specimen)          |
+----------------------+
 
 
 
+----------------------+--------------------+--------------------+----------------+
| Performing           | Address            | City/State/Zipcode | Phone Number   |
| Organization         |                    |                    |                |
+----------------------+--------------------+--------------------+----------------+
|   [facility]     |   [address] |   [place]  |   [phone] |
| [facility]  |                    | [address]              |                |
| - LABORATORY         |                    |                    |                |
+----------------------+--------------------+--------------------+----------------+
 documented in this encounter
 
 Visit Diagnoses
 
 
+------------------------------------------------------------+
| Diagnosis                                                  |
+------------------------------------------------------------+
|   Pilonidal abscess - Primary  Pilonidal cyst with abscess |
+------------------------------------------------------------+
 documented in this encounter

## 2020-09-28 NOTE — XMS
Encounter Summary
  Created on: 2020
 
 Marie Fermin
 External Reference #: [id number]
 : [birth year]
 Sex: Female
 
 Demographics
 
 
+-----------------------+------------------------+
| Address               | [address]          |
|                       | [place]  [address]      |
+-----------------------+------------------------+
| Home Phone            | [phone]        |
+-----------------------+------------------------+
| Preferred Language    | Unknown                |
+-----------------------+------------------------+
| Marital Status        |                 |
+-----------------------+------------------------+
| Episcopal Affiliation | Unknown                |
+-----------------------+------------------------+
| Race                  | White                  |
+-----------------------+------------------------+
| Ethnic Group          | Not  or  |
+-----------------------+------------------------+
 
 
 Author
 
 
+--------------+--------------------------------------------+
| Author       | [facility]  |
|              | and [place]                                |
+--------------+--------------------------------------------+
| Organization | [facility]  |
|              | and [place]                                |
+--------------+--------------------------------------------+
| Address      | Unknown                                    |
+--------------+--------------------------------------------+
| Phone        | Unavailable                                |
+--------------+--------------------------------------------+
 
 
 
 Support
 
 
+-----------------+--------------+---------+-----------------+
| Name            | Relationship | Address | Phone           |
+-----------------+--------------+---------+-----------------+
| Rodríguez Fermin | ECON         | Unknown | [phone] |
+-----------------+--------------+---------+-----------------+
 
 
 
 Care Team Providers
 
 
 
+-----------------------+------+-------------+
| Care Team Member Name | Role | Phone       |
+-----------------------+------+-------------+
| No, Physician         | PCP  | Unavailable |
+-----------------------+------+-------------+
 
 
 
 Reason for Visit
 
 
+-----------+-----------------+
| Reason    | Comments        |
+-----------+-----------------+
| Follow-up | cough  Room # 1 |
+-----------+-----------------+
 
 
 
 Encounter Details
 
 
+--------+---------+----------------------+----------------------+----------------------+
| Date   | Type    | Department           | Care Team            | Description          |
+--------+---------+----------------------+----------------------+----------------------+
| / | Office  |   [facility] [place] [facility]   |   Segundo Botello    | Bronchitis (Primary  |
| 2015   | Visit   | CARE  [address] | Godwin Samayoa MD      | Dx); Pharyngitis     |
|        |         |   [place]    | [address]       |                      |
|        |         | [address]           | [place]      |                      |
|        |         | [phone]         | [address]  [phone]  |                      |
|        |         |                      |  [phone] (Fax)  |                      |
+--------+---------+----------------------+----------------------+----------------------+
 
 
 
 Social History
 
 
+--------------------+-------+-----------+--------+------+
| Tobacco Use        | Types | Packs/Day | Years  | Date |
|                    |       |           | Used   |      |
+--------------------+-------+-----------+--------+------+
| Current Every Day  |       | 1         |        |      |
| Smoker             |       |           |        |      |
+--------------------+-------+-----------+--------+------+
 
 
 
+---------------------+---+---+---+
| Smokeless Tobacco:  |   |   |   |
| Never Used          |   |   |   |
+---------------------+---+---+---+
 
 
 
+-------------+-------------+---------+----------+
| Alcohol Use | Drinks/Week | oz/Week | Comments |
+-------------+-------------+---------+----------+
 
| Not Asked   |             |         |          |
+-------------+-------------+---------+----------+
 
 
 
+------------------+---------------+
| Sex Assigned at  | Date Recorded |
| Birth            |               |
+------------------+---------------+
| Not on file      |               |
+------------------+---------------+
 documented as of this encounter
 
 Last Filed Vital Signs
 
 
+-------------------+----------------------+----------------------+----------+
| Vital Sign        | Reading              | Time Taken           | Comments |
+-------------------+----------------------+----------------------+----------+
| Blood Pressure    | 110/76               | 2015  4:00 PM  |          |
|                   |                      | PST                  |          |
+-------------------+----------------------+----------------------+----------+
| Pulse             | 96                   | 2015  4:00 PM  |          |
|                   |                      | PST                  |          |
+-------------------+----------------------+----------------------+----------+
| Temperature       | 36.9   C (98.4   F)  | 2015  4:00 PM  |          |
|                   |                      | PST                  |          |
+-------------------+----------------------+----------------------+----------+
| Respiratory Rate  | 20                   | 2015  4:00 PM  |          |
|                   |                      | PST                  |          |
+-------------------+----------------------+----------------------+----------+
| Oxygen Saturation | 96%                  | 2015  4:00 PM  |          |
|                   |                      | PST                  |          |
+-------------------+----------------------+----------------------+----------+
| Inhaled Oxygen    | -                    | -                    |          |
| Concentration     |                      |                      |          |
+-------------------+----------------------+----------------------+----------+
| Weight            | 108.9 kg (240 lb 2.2 | 2015  4:00 PM  |          |
|                   |  oz)                 | PST                  |          |
+-------------------+----------------------+----------------------+----------+
| Height            | 162.6 cm (5' 4")     | 2015  4:00 PM  |          |
|                   |                      | PST                  |          |
+-------------------+----------------------+----------------------+----------+
| Body Mass Index   | 41.22                | 2015  4:00 PM  |          |
|                   |                      | PST                  |          |
+-------------------+----------------------+----------------------+----------+
 documented in this encounter
 
 Patient Instructions
 Patient Instructions Segundo Botello Jr., MD - 2015  4:32 PM PST
 Follow-up with your doctor or the clinic if not improving in 5 days.
 Take medication as directed
 Increase fluid intake
 Recheck sooner, in the clinic, with your doctor or in the ER, if your symptoms worsen or ne
w problems develop Electronically signed by Segundo Botello Jr., MD at 2015  4:
32 PM PST
 documented in this encounter
 
 Progress Notes
 Segundo Botello Jr., MD - 2015  4:29 PM PSTMistkristina Fermin presents with a 
 
two-week history of a dry nonproductive cough and sore throat.  She's had minimal fever.  Sh
e's had no runny nose.  She has a history of asthma as a child.  She's had no GI symptoms
 
 Exam: No distress, non-toxic in appearance
 Nose: clear
 Ears:  TM's  not inflamed
 Throat: erythema, no exudate
 Neck:  Supple, no stridor, no adenopathy
 Chest:  No rales, no rhonchi, no wheezes, good breath sounds bilaterally, no respiratory di
stress
 Heart:  Regular rhythm, no murmur, no gallop, no rub
  
 Diagnosis: Bronchitis, pharyngitis
 
 Plan:
 Zithromax Z-Jasen to cover mycoplasma
 
 Follow-up with your doctor or the clinic if not improving in 5 days.
 Take medication as directed
 Increase fluid intake
 Recheck sooner, in the clinic, with your doctor or in the ER, if your symptoms worsen or ne
w problems develop Electronically signed by Segundo Botello Jr., MD at 2015  5:
18 PM PSTdocumented in this encounter
 
 Plan of Treatment
 Not on filedocumented as of this encounter
 
 Visit Diagnoses
 
 
+-----------------------------------------------------------------------+
| Diagnosis                                                             |
+-----------------------------------------------------------------------+
|   Bronchitis - Primary  Bronchitis, not specified as acute or chronic |
+-----------------------------------------------------------------------+
|   Pharyngitis  Acute pharyngitis                                      |
+-----------------------------------------------------------------------+
 documented in this encounter

## 2020-09-28 NOTE — XMS
Encounter Summary
  Created on: 2020
 
 Marie Fermin
 External Reference #: [id number]
 : [birth year]
 Sex: Female
 
 Demographics
 
 
+-----------------------+------------------------+
| Address               | [address]          |
|                       | [place]  [address]      |
+-----------------------+------------------------+
| Home Phone            | [phone]        |
+-----------------------+------------------------+
| Preferred Language    | Unknown                |
+-----------------------+------------------------+
| Marital Status        |                 |
+-----------------------+------------------------+
| Voodoo Affiliation | Unknown                |
+-----------------------+------------------------+
| Race                  | White                  |
+-----------------------+------------------------+
| Ethnic Group          | Not  or  |
+-----------------------+------------------------+
 
 
 Author
 
 
+--------------+--------------------------------------------+
| Author       | [facility]  |
|              | and [place]                                |
+--------------+--------------------------------------------+
| Organization | [facility]  |
|              | and [place]                                |
+--------------+--------------------------------------------+
| Address      | Unknown                                    |
+--------------+--------------------------------------------+
| Phone        | Unavailable                                |
+--------------+--------------------------------------------+
 
 
 
 Support
 
 
+-----------------+--------------+---------+-----------------+
| Name            | Relationship | Address | Phone           |
+-----------------+--------------+---------+-----------------+
| Rodríguez Fermin | ECON         | Unknown | [phone] |
+-----------------+--------------+---------+-----------------+
 
 
 
 Care Team Providers
 
 
 
+-----------------------+------+-------------+
| Care Team Member Name | Role | Phone       |
+-----------------------+------+-------------+
| No, Physician         | PCP  | Unavailable |
+-----------------------+------+-------------+
 
 
 
 Reason for Visit
 
 
+-----------------+----------+
| Reason          | Comments |
+-----------------+----------+
| Neck Injury     |          |
+-----------------+----------+
| Shoulder Injury |          |
+-----------------+----------+
| Back Injury     |          |
+-----------------+----------+
 
 
 
 Encounter Details
 
 
+--------+---------+----------------------+---------------------+----------------------+
| Date   | Type    | Department           | Care Team           | Description          |
+--------+---------+----------------------+---------------------+----------------------+
| 10/07/ | Office  |   [facility] [place] [facility]          |   Cathi Osorio  | Cervical strain,     |
|    | Visit   | [facility]  | MD Becky  [address]     | initial encounter    |
|        |         | [address]  [place]   | (Primary Dx); Chest  |
|        |         | [address]  [place] [address]     | wall muscle strain,  |
|        |         |  [place]           | [phone]        | initial encounter;   |
|        |         | [address]           | [phone] (Fax)  | Thoracic myofascial  |
|        |         | [phone]         |                     | strain, initial      |
|        |         |                      |                     | encounter; Place of  |
|        |         |                      |                     | occurrence,          |
|        |         |                      |                     | industrial places    |
|        |         |                      |                     | and premises;        |
|        |         |                      |                     | Civilian activity    |
|        |         |                      |                     | done for income or   |
|        |         |                      |                     | pay                  |
+--------+---------+----------------------+---------------------+----------------------+
 
 
 
 Social History
 
 
+--------------------+-------+-----------+--------+------+
| Tobacco Use        | Types | Packs/Day | Years  | Date |
|                    |       |           | Used   |      |
+--------------------+-------+-----------+--------+------+
| Current Every Day  |       | 1         |        |      |
| Smoker             |       |           |        |      |
+--------------------+-------+-----------+--------+------+
 
 
 
 
+---------------------+---+---+---+
| Smokeless Tobacco:  |   |   |   |
| Never Used          |   |   |   |
+---------------------+---+---+---+
 
 
 
+-------------+-------------+---------+----------+
| Alcohol Use | Drinks/Week | oz/Week | Comments |
+-------------+-------------+---------+----------+
| Not Asked   |             |         |          |
+-------------+-------------+---------+----------+
 
 
 
+------------------+---------------+
| Sex Assigned at  | Date Recorded |
| Birth            |               |
+------------------+---------------+
| Not on file      |               |
+------------------+---------------+
 documented as of this encounter
 
 Last Filed Vital Signs
 
 
+-------------------+-------------------+----------------------+----------+
| Vital Sign        | Reading           | Time Taken           | Comments |
+-------------------+-------------------+----------------------+----------+
| Blood Pressure    | 120/72            | 10/07/2014  3:16 PM  |          |
|                   |                   | PDT                  |          |
+-------------------+-------------------+----------------------+----------+
| Pulse             | 76                | 10/07/2014  3:16 PM  |          |
|                   |                   | PDT                  |          |
+-------------------+-------------------+----------------------+----------+
| Temperature       | 36.7   C (98   F) | 10/07/2014  3:16 PM  |          |
|                   |                   | PDT                  |          |
+-------------------+-------------------+----------------------+----------+
| Respiratory Rate  | 16                | 10/07/2014  3:16 PM  |          |
|                   |                   | PDT                  |          |
+-------------------+-------------------+----------------------+----------+
| Oxygen Saturation | -                 | -                    |          |
+-------------------+-------------------+----------------------+----------+
| Inhaled Oxygen    | -                 | -                    |          |
| Concentration     |                   |                      |          |
+-------------------+-------------------+----------------------+----------+
| Weight            | 102.1 kg (225 lb) | 10/07/2014  3:16 PM  |          |
|                   |                   | PDT                  |          |
+-------------------+-------------------+----------------------+----------+
| Height            | 175.3 cm (5' 9")  | 10/07/2014  3:16 PM  |          |
|                   |                   | PDT                  |          |
+-------------------+-------------------+----------------------+----------+
| Body Mass Index   | 33.23             | 10/07/2014  3:16 PM  |          |
|                   |                   | PDT                  |          |
+-------------------+-------------------+----------------------+----------+
 documented in this encounter
 
 Progress Notes
 
 Cathi Osorio MD - 10/07/2014  4:00 PM PDTEmployer: [employer]
 Guarantor: [facility] claims
 Date of injury: 14
 Claim number: [id number]
 
 Chief complaint: Followup chest, back, and neck injury
 
 Subjective:
 Patient is a 39-year-old female who presents today for scheduled followup and initial evalu
ation by me for work-related injury.  She states on the date of injury she was entering the 
freezer at her work, slipped on ice which resulted in a jerking of her upper body.  She did 
not fall or hit any objects in the process.  She then developed pain in the anterior upper c
hest, upper back, and posterior neck.  Because of persistent symptoms she was seen and evalu
ated in the urgent care twice, diagnosed with strain, given medications and placed on modifi
ed duty.  The nabumetone caused her to have an upset stomach so she has been using ibuprofen
 instead.  She saw no relief with the muscle relaxer so has discontinued it.  She states the
 pain in the anterior chest is completely resolved.  The pain in the upper back has complete
ly resolved.  She has no real pain in the posterior neck but has started having headaches wh
ich start in the posterior neck, radiates up to the back of the head, encircling the head li
ke a vice.  She is not taking any medications for this.  No radiation of pain into the arms 
or hands.  No numbness, tingling, or weakness of the extremities.  No other symptoms associa
shelia with headache.  Headaches are intermittent, less severe than they have been over the pas
t several days but still have been occurring.  She reports no new injury.  No prior history 
of injury or trauma to the  neck, back or upper chest.
 
 Past medical history, past surgical history, family history, social history, medications, a
llergies reviewed
 
 Review of systems:
 13 point review of systems reviewed, positive for joint pain, stiffness, headaches, sinus c
ongestion, runny nose, otherwise ROS is As per HPI
 
 Objective:
 Vital signs as noted, nursing notes reviewed.
 General-well-developed, well-nourished, in no apparent distress, pleasant cooperative.
 HEENT-normocephalic and atraumatic.  PERRLA, EOMI.
 Neck-normal to inspection.  No paraspinous muscle spasticity.  No vertebral point tendernes
s, crepitus, or step-off.  Mild tenderness with palpation of the paravertebral muscles of th
e mid cervical spine bilaterally.  Full flexion, extension, and lateral rotation without mundo
n or limitation.  Negative Spurling testing bilaterally.
 Thoracic Back-normal to inspection.  No paraspinous muscle spasticity.  No vertebral point 
tenderness, crepitus, or step-off.  No tender points elicited palpation.
 Cardiovascular-regular rate rhythm without murmur rub or gallop.
 Lungs-clear to auscultation bilaterally.
 Chest-anterior chest walls nontender with palpation.
 Extremities-no clubbing, cyanosis, or edema.  Radial pulses 2+/4 bilaterally.  No discrepan
cy in muscle bulk or tone.
 Neuro--motor strength 5/5 bilateral upper  extremities.  DTRs 2+/4 bilateral upper extremit
ies.  Cranial nerves II through XII grossly intact.
 Skin-no rashes, erythema, abrasions, ecchymoses, or other lesions are noted.  Warm, dry, in
tact.
 
 Imaging/diagnostics:
 None indicated this visit
 
 Pending interventions:
 Continue conservative management.
 
 Assessment:
 1.  Cervical strain-with muscle tension headache
 
 2.  Chest wall strain-resolved
 3.  Thoracic strain-resolved
 
 Plan:
 Symptomatic treatment, cold compresses, ice, trial of Flexeril as needed.  Sedating side ef
fects reviewed and discussed.  Followup in 3-4 weeks to recheck, return sooner for acute wor
sening or other concerns.
 
 E: Regular duty
 
 R: None
 
 Impairment undetermined at this point in time, based on current objective findings it is no
t an anticipated consequence of this injury however patient has not yet reached MMI status.
 
 This note was dictated using dragon voice recognition software.  Occasional wrong- word or 
sound-alike substitutions may have occurred due to the inherent limitations of voice recogni
tion software.  Please read the chart carefully and recognize, using context, where these scott
bstitutions have occurred. 
 
 Electronically signed by Cathi Osorio MD at 10/07/2014  4:10 PM PDTdocumented in th
is encounter
 
 Miscellaneous Notes
 Plan of Care - ONBASE SCAN [facility] - 10/07/2014 12:00 AM PDTElectronically signed by ROLDAN Rabago at 10/13/2014 11:06 AM PDTPlan of Care - ONBASE SCAN [facility] - 10/07/2014 12:00 AM PDTElect
ronically signed by Anoop Rabago at 10/13/2014 11:06 AM PDTPlan of Care - ONBASE SCAN [facility] -
 10/07/2014 12:00 AM PDTElectronically signed by Anoop Rabago at 10/13/2014 11:06 AM PDTdocu
mented in this encounter
 
 Plan of Treatment
 Not on filedocumented as of this encounter
 
 Visit Diagnoses
 
 
+-------------------------------------------------------+
| Diagnosis                                             |
+-------------------------------------------------------+
|   Cervical strain, initial encounter - Primary        |
+-------------------------------------------------------+
|   Chest wall muscle strain, initial encounter         |
+-------------------------------------------------------+
|   Thoracic myofascial strain, initial encounter       |
+-------------------------------------------------------+
|   Place of occurrence, industrial places and premises |
+-------------------------------------------------------+
|   Civilian activity done for income or pay            |
+-------------------------------------------------------+
 documented in this encounter

## 2020-09-28 NOTE — XMS
Encounter Summary
  Created on: 2020
 
 Marie Fermin
 External Reference #: [id number]
 : [birth year]
 Sex: Female
 
 Demographics
 
 
+-----------------------+------------------------+
| Address               | [address]          |
|                       | [place]  [address]      |
+-----------------------+------------------------+
| Home Phone            | [phone]        |
+-----------------------+------------------------+
| Preferred Language    | Unknown                |
+-----------------------+------------------------+
| Marital Status        |                 |
+-----------------------+------------------------+
| Orthodoxy Affiliation | Unknown                |
+-----------------------+------------------------+
| Race                  | White                  |
+-----------------------+------------------------+
| Ethnic Group          | Not  or  |
+-----------------------+------------------------+
 
 
 Author
 
 
+--------------+--------------------------------------------+
| Author       | [facility]  |
|              | and [place]                                |
+--------------+--------------------------------------------+
| Organization | [facility]  |
|              | and [place]                                |
+--------------+--------------------------------------------+
| Address      | Unknown                                    |
+--------------+--------------------------------------------+
| Phone        | Unavailable                                |
+--------------+--------------------------------------------+
 
 
 
 Support
 
 
+-----------------+--------------+---------+-----------------+
| Name            | Relationship | Address | Phone           |
+-----------------+--------------+---------+-----------------+
| Rodríguez Fermin | ECON         | Unknown | [phone] |
+-----------------+--------------+---------+-----------------+
 
 
 
 Care Team Providers
 
 
 
+-----------------------+------+-------------+
| Care Team Member Name | Role | Phone       |
+-----------------------+------+-------------+
 PCP  | Unavailable |
+-----------------------+------+-------------+
 
 
 
 Encounter Details
 
 
+--------+-----------+----------------------+-----------+-------------+
| Date   | Type      | Department           | Care Team | Description |
+--------+-----------+----------------------+-----------+-------------+
| / | Hospital  |   [facility] |           |             |
|    | Encounter |  [facility]   |           |             |
|        |           | [facility] |           |             |
|        |           |   [place]    |           |             |
|        |           | [id number]           |           |             |
|        |           | [phone]         |           |             |
+--------+-----------+----------------------+-----------+-------------+
 
 
 
 Social History
 
 
+----------------+-------+-----------+--------+------+
| Tobacco Use    | Types | Packs/Day | Years  | Date |
|                |       |           | Used   |      |
+----------------+-------+-----------+--------+------+
| Never Assessed |       |           |        |      |
+----------------+-------+-----------+--------+------+
 
 
 
+------------------+---------------+
| Sex Assigned at  | Date Recorded |
| Birth            |               |
+------------------+---------------+
| Not on file      |               |
+------------------+---------------+
 documented as of this encounter
 
 Plan of Treatment
 Not on filedocumented as of this encounter
 
 Visit Diagnoses
 Not on filedocumented in this encounter

## 2020-09-28 NOTE — XMS
Encounter Summary
  Created on: 2020
 
 Marie Fermin
 External Reference #: [id number]
 : [birth year]
 Sex: Female
 
 Demographics
 
 
+-----------------------+------------------------+
| Address               | [address]          |
|                       | [place]  [address]      |
+-----------------------+------------------------+
| Home Phone            | [phone]        |
+-----------------------+------------------------+
| Preferred Language    | Unknown                |
+-----------------------+------------------------+
| Marital Status        |                 |
+-----------------------+------------------------+
| Judaism Affiliation | Unknown                |
+-----------------------+------------------------+
| Race                  | White                  |
+-----------------------+------------------------+
| Ethnic Group          | Not  or  |
+-----------------------+------------------------+
 
 
 Author
 
 
+--------------+--------------------------------------------+
| Author       | [facility] [place]  |
|              | and [place]                                |
+--------------+--------------------------------------------+
| Organization | [facility] [place]  |
|              | and [place]                                |
+--------------+--------------------------------------------+
| Address      | Unknown                                    |
+--------------+--------------------------------------------+
| Phone        | Unavailable                                |
+--------------+--------------------------------------------+
 
 
 
 Support
 
 
+-----------------+--------------+---------+-----------------+
| Name            | Relationship | Address | Phone           |
+-----------------+--------------+---------+-----------------+
| Rodríguez Fermin | ECON         | Unknown | [phone] |
+-----------------+--------------+---------+-----------------+
 
 
 
 Care Team Providers
 
 
 
+-----------------------+------+-----------------+
| Care Team Member Name | Role | Phone           |
+-----------------------+------+-----------------+
| ROLDAN Horta NP   | PCP  | [phone] |
+-----------------------+------+-----------------+
 
 
 
 Reason for Visit
 
 
+--------+-----------------------+
| Reason | Comments              |
+--------+-----------------------+
| Cyst   | rm 5 cyst on tailbone |
+--------+-----------------------+
 
 
 
 Encounter Details
 
 
+--------+---------+----------------------+----------------------+----------------------+
| Date   | Type    | Department           | Care Team            | Description          |
+--------+---------+----------------------+----------------------+----------------------+
| / | Office  |   [facility]   |   Jono,        | Pilonidal cyst with  |
| 2019   | Visit   | CARE  [address] | Reji Gutierrez MD   | abscess (Primary Dx) |
|        |         |   [facility] [place]    | [address]       |                      |
|        |         | [address]           | [place], [place]      |                      |
|        |         | [phone]         | [address]  [phone]  |                      |
|        |         |                      |  [phone] (Fax)  |                      |
+--------+---------+----------------------+----------------------+----------------------+
 
 
 
 Social History
 
 
+--------------------+-------+-----------+--------+------+
| Tobacco Use        | Types | Packs/Day | Years  | Date |
|                    |       |           | Used   |      |
+--------------------+-------+-----------+--------+------+
| Current Every Day  |       | 1         |        |      |
| Smoker             |       |           |        |      |
+--------------------+-------+-----------+--------+------+
 
 
 
+---------------------+---+---+---+
| Smokeless Tobacco:  |   |   |   |
| Never Used          |   |   |   |
+---------------------+---+---+---+
 
 
 
+-------------+-------------+---------+----------+
| Alcohol Use | Drinks/Week | oz/Week | Comments |
+-------------+-------------+---------+----------+
 
| Not Asked   |             |         |          |
+-------------+-------------+---------+----------+
 
 
 
+------------------+---------------+
| Sex Assigned at  | Date Recorded |
| Birth            |               |
+------------------+---------------+
| Not on file      |               |
+------------------+---------------+
 documented as of this encounter
 
 Last Filed Vital Signs
 
 
+-------------------+---------------------+----------------------+----------+
| Vital Sign        | Reading             | Time Taken           | Comments |
+-------------------+---------------------+----------------------+----------+
| Blood Pressure    | 131/81              | 2019  8:29 AM  |          |
|                   |                     | PST                  |          |
+-------------------+---------------------+----------------------+----------+
| Pulse             | 104                 | 2019  8:29 AM  |          |
|                   |                     | PST                  |          |
+-------------------+---------------------+----------------------+----------+
| Temperature       | 36.8   C (98.2   F) | 2019  8:29 AM  |          |
|                   |                     | PST                  |          |
+-------------------+---------------------+----------------------+----------+
| Respiratory Rate  | 18                  | 2019  8:29 AM  |          |
|                   |                     | PST                  |          |
+-------------------+---------------------+----------------------+----------+
| Oxygen Saturation | 97%                 | 2019  8:29 AM  |          |
|                   |                     | PST                  |          |
+-------------------+---------------------+----------------------+----------+
| Inhaled Oxygen    | -                   | -                    |          |
| Concentration     |                     |                      |          |
+-------------------+---------------------+----------------------+----------+
| Weight            | 105 kg (231 lb 7.7  | 2019  8:29 AM  |          |
|                   | oz)                 | PST                  |          |
+-------------------+---------------------+----------------------+----------+
| Height            | -                   | -                    |          |
+-------------------+---------------------+----------------------+----------+
| Body Mass Index   | 38.52               | 2016  1:42 PM  |          |
|                   |                     | PST                  |          |
+-------------------+---------------------+----------------------+----------+
 documented in this encounter
 
 Patient Instructions
 Patient Instructions Reji De La Cruz MD - 2019  8:41 AM PST
 Pilonidal Cyst, Infected (Antibiotic Treatment)
 A pilonidal cyst is a swelling that starts under the skin on the sacrum near the tailbone. 
It may look like a small dimple. It can fill with skin oils, hair, and dead skin cells. It m
ay stay small or grow larger. It may become infected with normal skin bacteria because it of
ten has an opening to the surface.
 Causes
 The cause of pilonidal cysts has been debated since they were first recognized. A cyst may 
be present at birth and go unnoticed. Injury, rubbing, or skin irritation may also cause pil
onidal cysts. It can also be caused by an ingrown hair. The cause is most likely a combinati
on of these things. Because some injury or irritation can lead to pilonidal cysts, they can 
be more common in people who sit or drive a lot for work.
 
 Symptoms
 A pilonidal cyst may be small and painless. If it becomes inflamed or infected, you may hav
e these symptoms:
  Swelling
  Irritation or redness
  Pain
  Drainage
 The cyst can swell and drain on its own. The swelling and drainage can come and go.
 Treatment
 A limited infection can be treated with antibiotics and home care. You have been given anti
biotics to treat your infected pilonidal cyst.
 Home care
 The following guidelines will help you care for your wound at home:
  Sit in a tub filled with about 6 inches of hot water. Keep the water hot for 10 to 15 mi
nutes.
  Don't squeeze the pilonidal cyst or stick a needle in it to drain it. This will make the
 infection worse, or spread it.
  Cover the cyst with a pad or something similar to keep it from becoming more irritated, 
damaged, and painful.
 Medicines
  Take acetaminophen or ibuprofen for pain, unless you were given a different pain medicin
e to use. Talk with your doctor before using these medicines if you have chronic liver or ki
dney disease, or have ever had a stomach ulcer or digestive bleeding. Also talk with your do
ctor if you are taking blood thinner medicines.
  Take the antibiotics that you were prescribed until they are all gone. To make sure the 
infection is cured, it is important to finish the antibiotics even if the wound looks better
.
  Useantibiotic cream or ointment if your healthcare provider tells you to.
 Preventing future infections
 Once this infection has healed, follow these tips to lower the risk for another infection:
  Keep the area of the cyst clean by bathing or showering every day.
  Don't wear tight-fitting clothing. This will help lessen sweat and irritation of the ski
n.
  You may need surgery to completely remove the cyst if it keeps coming back. The surgery 
can only be done when the cyst is not infected. Ask your doctor for more information.
  Watchfor signs of infection listed below so that treatment may be started early.
 Follow-up care
 Follow up with your healthcare provider, or as advised. Check your wound every day for the 
signs listed below.
 When to seek medical advice
 Call your healthcare provider right away if any of these occur:
  Pus coming from the cyst
  Increasing local pain, redness, or swelling
  Fever of 100.4F (38.0C) or higher for more than 2 days, or as directed by your healt
hcare provider
 Date Last Reviewed: 2016-2018 [employer]. [address] [place] [address]. All righ
ts reserved. This information is not intended as a substitute for professional medical care.
 Always follow your healthcare professional's instructions.
 
 Electronically signed by Reji De La Cruz MD at 2019  8:42 AM PST
 documented in this encounter
 
 Progress Notes
 Reji De La Cruz MD - 2019  8:15 AM PSTFormatting of this note might be di
fferent from the original.
 Subjective: 
  
 Chief Complaint: Cyst (rm 5 cyst on tailbone)
  
 
 
 Marie is a 43 y.o. female who comes in complaining of recurrent painful cyst.
 
 HPI this patient has had a pilonidal cyst with recurring abscesses for several years.  She 
has already made plans to get this resected but surgical consult has not yet occurred.  She'
s had increasing pain and then it's been draining the last few days.  She has no fevers chil
ls.
 
 Patient's medications, allergies, past medical, surgical, social and family histories were 
reviewed and updated as appropriate.
 
 ROS 
 See above
  
 Objective: 
 
 /81  | Pulse 104  | Temp 36.8 C (98.2 F) (Temporal)  | Resp 18  | Wt 105 kg (231 
lb 7.7 oz)  | LMP 2016 (Exact Date)  | SpO2 97%  | BMI 38.52 kg/m 
 Physical Exam 
 Nontoxic vital signs stable
 There is a pilonidal cyst apparent on the upper right gluteal cleft.  It appears moderately
 soft/not tenths.  There is inflammation and some purulent drainage visible.  It is tender b
ut not exquisitely so
 
 No results found for this or any previous visit (from the past 24 hour(s)).
 
 Assessment and Plans: 
  
 1. Pilonidal cyst with abscess
 I recommend aggressively using Epsom salt sitz baths.  We'll start her on Augmentin.  I agr
ee with proceeding to surgery as this has been a recurrent problem.
 
 This note was dictated using dragon voice recognition software. Occasional wrong- word or s
ound-alike substitutions may have occurred due to the inherent limitations of voice recognit
ion software. Please read the chart carefully and recognize, using context, where these subs
titutions have occurred. 
 Electronically signed by Reji De La Cruz MD at 2019  8:51 AM PSTdocumented
 in this encounter
 
 Plan of Treatment
 Not on filedocumented as of this encounter
 
 Visit Diagnoses
 
 
+-----------------------------------------+
| Diagnosis                               |
+-----------------------------------------+
|   Pilonidal cyst with abscess - Primary |
+-----------------------------------------+
 documented in this encounter

## 2020-09-28 NOTE — XMS
Encounter Summary
  Created on: 2020
 
 Marie Fermin
 External Reference #: [id number]
 : [birth year]
 Sex: Female
 
 Demographics
 
 
+-----------------------+------------------------+
| Address               | [address]          |
|                       | [place]  [address]      |
+-----------------------+------------------------+
| Home Phone            | [phone]        |
+-----------------------+------------------------+
| Preferred Language    | Unknown                |
+-----------------------+------------------------+
| Marital Status        |                 |
+-----------------------+------------------------+
| Christianity Affiliation | Unknown                |
+-----------------------+------------------------+
| Race                  | White                  |
+-----------------------+------------------------+
| Ethnic Group          | Not  or  |
+-----------------------+------------------------+
 
 
 Author
 
 
+--------------+--------------------------------------------+
| Author       | [facility]  |
|              | and [place]                                |
+--------------+--------------------------------------------+
| Organization | [facility]  |
|              | and [place]                                |
+--------------+--------------------------------------------+
| Address      | Unknown                                    |
+--------------+--------------------------------------------+
| Phone        | Unavailable                                |
+--------------+--------------------------------------------+
 
 
 
 Support
 
 
+-----------------+--------------+---------+-----------------+
| Name            | Relationship | Address | Phone           |
+-----------------+--------------+---------+-----------------+
| Rodríguez Fermin | ECON         | Unknown | [phone] |
+-----------------+--------------+---------+-----------------+
 
 
 
 Care Team Providers
 
 
 
+-----------------------+------+-------------+
| Care Team Member Name | Role | Phone       |
+-----------------------+------+-------------+
| No, Physician         | PCP  | Unavailable |
+-----------------------+------+-------------+
 
 
 
 Reason for Visit
 Diagnostic/Screening (Routine)
 
+--------+--------+-----------+--------------+--------------+---------------+
| Status | Reason | Specialty | Diagnoses /  | Referred By  | Referred To   |
|        |        |           | Procedures   | Contact      | Contact       |
+--------+--------+-----------+--------------+--------------+---------------+
| Closed |        | Radiology |   Diagnoses  |   [place]      |   [facility]     |
|        |        |           |  Right-sided | Yary D,   | [address]  |
|        |        |           |  low back    | MD  Need     |  [place], |
|        |        |           | pain with    | updated      |  [place]           |
|        |        |           | right-sided  | address      | [address]    |
|        |        |           | sciatica,    |              | Phone:        |
|        |        |           | unspecified  |              | [phone]  |
|        |        |           | chronicity   |              |  Fax:         |
|        |        |           | Procedures   |              | [phone]  |
|        |        |           | MRI Lumbar   |              |               |
|        |        |           | Spine wo     |              |               |
|        |        |           | Contrast     |              |               |
+--------+--------+-----------+--------------+--------------+---------------+
 
 
 
 
 Encounter Details
 
 
+--------+-----------+----------------------+---------------------+-------------+
| Date   | Type      | Department           | Care Team           | Description |
+--------+-----------+----------------------+---------------------+-------------+
| / | Hospital  |   [facility] |   Yary Jeff,  |             |
| 2016   | Encounter |  [facility] MRI  [address]  | MD  Need updated    |             |
|        |           | [place], | address             |             |
|        |           |  [place] [address]       |                     |             |
|        |           | [phone]         |                     |             |
+--------+-----------+----------------------+---------------------+-------------+
 
 
 
 Social History
 
 
+--------------------+-------+-----------+--------+------+
| Tobacco Use        | Types | Packs/Day | Years  | Date |
|                    |       |           | Used   |      |
+--------------------+-------+-----------+--------+------+
| Current Every Day  |       | 1         |        |      |
| Smoker             |       |           |        |      |
+--------------------+-------+-----------+--------+------+
 
 
 
 
+---------------------+---+---+---+
| Smokeless Tobacco:  |   |   |   |
| Never Used          |   |   |   |
+---------------------+---+---+---+
 
 
 
+-------------+-------------+---------+----------+
| Alcohol Use | Drinks/Week | oz/Week | Comments |
+-------------+-------------+---------+----------+
| Not Asked   |             |         |          |
+-------------+-------------+---------+----------+
 
 
 
+------------------+---------------+
| Sex Assigned at  | Date Recorded |
| Birth            |               |
+------------------+---------------+
| Not on file      |               |
+------------------+---------------+
 documented as of this encounter
 
 Medications at Time of Discharge
 
 
+----------------------+----------------------+-----------+---------+----------+-----------+
| Medication           | Sig                  | Dispensed | Refills | Start    | End Date  |
|                      |                      |           |         | Date     |           |
+----------------------+----------------------+-----------+---------+----------+-----------+
|   ibuprofen (ADVIL,  | Take 400 mg by mouth |           | 0       |          |           |
| MOTRIN) 200 mg       |  every 6 hours as    |           |         |          |           |
| tablet               | needed for Pain.     |           |         |          |           |
+----------------------+----------------------+-----------+---------+----------+-----------+
|   albuterol 2.5 mg/3 | Use one vial in your |   60 vial | 0       | 20 | 10/08/201 |
|  mL nebulizer        |  nebulizer every 4   |           |         | 15       | 6         |
| solutionIndications: | hours as needed for  |           |         |          |           |
|  Asthma, mild        | wheezing             |           |         |          |           |
| intermittent,        |                      |           |         |          |           |
| uncomplicated        |                      |           |         |          |           |
+----------------------+----------------------+-----------+---------+----------+-----------+
|   diazepam (VALIUM)  | Take 1 tablet by     |   1       | 0       | 20 | 10/08/201 |
| 10 MG tablet         | mouth as needed (1   | tablet    |         | 16       | 6         |
|                      | hour before MRI) for |           |         |          |           |
|                      |  up to 1 dose.       |           |         |          |           |
+----------------------+----------------------+-----------+---------+----------+-----------+
|   meloxicam (MOBIC)  | Take 1 tablet by     |   30      | 0       | 20 | 10/08/201 |
| 15 mg                | mouth Daily as       | tablet    |         | 16       | 6         |
| tabletIndications:   | needed for Pain.     |           |         |          |           |
| Right-sided low back |                      |           |         |          |           |
|  pain with           |                      |           |         |          |           |
| right-sided          |                      |           |         |          |           |
| sciatica,            |                      |           |         |          |           |
| unspecified          |                      |           |         |          |           |
| chronicity           |                      |           |         |          |           |
+----------------------+----------------------+-----------+---------+----------+-----------+
|                      | Take  by mouth.      |           | 0       |          | 10/08/201 |
| [id number] |                      |           |         |          | 6         |
 
| P (DIMETAPP          |                      |           |         |          |           |
| MULTISYMPTOM         |                      |           |         |          |           |
| COLD/FLU PO)         |                      |           |         |          |           |
+----------------------+----------------------+-----------+---------+----------+-----------+
|   PROAIR  (90 | inhale 2 puffs by    |   8.5 g   | 3       | 20 | 10/08/201 |
|  BASE) MCG/ACT       | mouth every 4 hours  |           |         | 16       | 6         |
| inhaler              | if needed for        |           |         |          |           |
|                      | wheezing             |           |         |          |           |
+----------------------+----------------------+-----------+---------+----------+-----------+
|   traMADol (ULTRAM)  | One tablet orally    |   30      | 0       | 20 | 10/08/201 |
| 50 mg                | every 4 hours as     | tablet    |         | 16       | 6         |
| tabletIndications:   | needed for pain      |           |         |          |           |
| Low back pain,       |                      |           |         |          |           |
| unspecified back     |                      |           |         |          |           |
| pain laterality,     |                      |           |         |          |           |
| unspecified          |                      |           |         |          |           |
| chronicity, with     |                      |           |         |          |           |
| sciatica presence    |                      |           |         |          |           |
| unspecified          |                      |           |         |          |           |
+----------------------+----------------------+-----------+---------+----------+-----------+
 documented as of this encounter
 
 Plan of Treatment
 Not on filedocumented as of this encounter
 
 Procedures
 
 
+----------------------+--------+-------------+----------------------+----------------------
+
| Procedure Name       | Priori | Date/Time   | Associated Diagnosis | Comments             
|
|                      | ty     |             |                      |                      
|
+----------------------+--------+-------------+----------------------+----------------------
+
| MRI LUMBAR SPINE WO  | Routin | 2016  |   Right-sided low    |   Results for this   
|
| CONTRAST             | e      | 11:58 AM    | back pain with       | procedure are in the 
|
|                      |        | PDT         | right-sided          |  results section.    
|
|                      |        |             | sciatica,            |                      
|
|                      |        |             | unspecified          |                      
|
|                      |        |             | chronicity           |                      
|
+----------------------+--------+-------------+----------------------+----------------------
+
 documented in this encounter
 
 Results
 MRI Lumbar Spine wo Contrast (2016 11:58 AM PDT)
 
+----------+
| Specimen |
+----------+
|          |
+----------+
 
 
 
 
+------------------------------------------------------------------------+-----------------+
| Narrative                                                              | Performed At    |
+------------------------------------------------------------------------+-----------------+
|   EXAM: MRI LUMBAR SPINE WO CONTRAST dated 2016 10:00 AM          |   [facility]    |
| HISTORY:2 months of low back pain with radiation into R leg            | [facility]        |
| COMPARISON:   2016 radiographs.     TECHNIQUE: Multiplanar   [facility]  |
| multisequence MR imaging of the lumbar spine without  contrast.   This | - IMAGING       |
|  is performed on a 3Tesla MRI scanner.      FINDINGS:There are 5       |                 |
| lumbar-type vertebral bodies.   This is either assumed for  counting   |                 |
| purposes or documented on prior studies.   No scoliosis.   No          |                 |
| significant  spondylolisthesis.   There is mild disc desiccation and   |                 |
| narrowing at L4-L5.   There are no significant endplate degenerative   |                 |
| changes.   The vertebral body  height and signal is normal in all      |                 |
| levels.   The conus terminates at the L1  level.   The visible distal  |                 |
| cord is unremarkable.   The terminal ventricle is  visible.     The    |                 |
| following levels are evaluated in the axial plane:     T12-L1: No      |                 |
| significant central stenosis or neural foraminal narrowing.     L1-2:  |                 |
| No significant central stenosis or neural foraminal narrowing.         |                 |
| L2-3: No significant central stenosis or neural foraminal narrowing.   |                 |
|    L3-4: No significant central stenosis or neural foraminal           |                 |
| narrowing.      L4-5: No significant central stenosis or neural        |                 |
| foraminal narrowing.      L5-S1: There is a small right eccentric disc |                 |
|  protrusion which mildly narrows the  right subarticular recess.       |                 |
|   There is mild right neural foraminal narrowing.      There is a      |                 |
| partially visualized large heterogeneous mass emanating from the top   |                 |
| of the uterus.   This measures approximately 17 cm in craniocaudal     |                 |
| dimension.     IMPRESSION -      Mild lumbar spondylosis at L4-L5.     |                 |
|  17 cm heterogeneous mass emanating from fundus of the uterus.   This  |                 |
| most common  would represent a fibroid.     Dictated and Signed by:    |                 |
| Homer Vazquez MD    Electronically signed: 2016 1:19 PM        |                 |
+------------------------------------------------------------------------+-----------------+
 
 
 
+------------------------------------------------------------------------------------------+
| Procedure Note                                                                           |
+------------------------------------------------------------------------------------------+
|   Charlie Briceño Results In - 2016  1:22 PM PDT  EXAM: MRI LUMBAR SPINE WO CONTRAST      |
| dated 2016 10:00 AMHISTORY:2 months of low back pain with radiation into R          |
| legCOMPARISON:  2016 radiographs.TECHNIQUE: Multiplanar multisequence MR       |
| imaging of the lumbar spine withoutcontrast.  This is performed on a 3Tesla MRI scanner. |
|  FINDINGS:There are 5 lumbar-type vertebral bodies.  This is either assumed forcounting  |
| purposes or documented on prior studies.  No scoliosis.  No                              |
| significantspondylolisthesis.  There is mild disc desiccation and narrowing at L4-L5.    |
| There are no significant endplate degenerative changes.  The vertebral bodyheight and    |
| signal is normal in all levels.  The conus terminates at the [id number].  The visible       |
| distal cord is unremarkable.  The terminal ventricle isvisible.The following levels are  |
| evaluated in the axial plane:T12-L1: No significant central stenosis or neural foraminal |
|  narrowing.L1-2: No significant central stenosis or neural foraminal narrowing.L2-3: No  |
| significant central stenosis or neural foraminal narrowing.L3-4: No significant central  |
| stenosis or neural foraminal narrowing. L4-5: No significant central stenosis or neural  |
| foraminal narrowing. L5-S1: There is a small right eccentric disc protrusion which       |
| mildly narrows theright subarticular recess.  There is mild right neural foraminal       |
| narrowing. There is a partially visualized large heterogeneous mass emanating from the   |
| topof the uterus.  This measures approximately 17 cm in craniocaudal                     |
| dimension.IMPRESSION - Mild lumbar spondylosis at L4-L5.17 cm heterogeneous mass         |
| emanating from fundus of the uterus.  This most commonwould represent a fibroid.Dictated |
 
|  and Signed by: Homer Vazquez MD  Electronically signed: 2016 1:19 PM            |
|L1-2: No significant central stenosis or neural foraminal narrowing.                      |
|                                                                                          |
|L2-3: No significant central stenosis or neural foraminal narrowing.                      |
|                                                                                          |
|L3-4: No significant central stenosis or neural foraminal narrowing.                      |
|                                                                                          |
|L4-5: No significant central stenosis or neural foraminal narrowing.                      |
|                                                                                          |
|L5-S1: There is a small right eccentric disc protrusion which mildly narrows the          |
|right subarticular recess.  There is mild right neural foraminal narrowing.               |
|                                                                                          |
|There is a partially visualized large heterogeneous mass emanating from the top           |
|of the uterus.  This measures approximately 17 cm in craniocaudal dimension.              |
|                                                                                          |
|IMPRESSION -                                                                              |
|                                                                                          |
|Mild lumbar spondylosis at L4-L5.                                                         |
|                                                                                          |
|17 cm heterogeneous mass emanating from fundus of the uterus.  This most common           |
|would represent a fibroid.                                                                |
|                                                                                          |
|Dictated and Signed by: Homer Vazquez MD                                               |
| Electronically signed: 2016 1:19 PM                                                 |
+------------------------------------------------------------------------------------------+
 
 
 
+----------------------+---------------------+--------------------+----------------+
| Performing           | Address             | [facility] | Phone Number   |
| Organization         |                     |                    |                |
+----------------------+---------------------+--------------------+----------------+
|   [facility]     |   [address] |   [place]  |   [phone] |
| [facility]  |                     | [address]              |                |
| - IMAGING            |                     |                    |                |
+----------------------+---------------------+--------------------+----------------+
 documented in this encounter
 
 Visit Diagnoses
 Not on filedocumented in this encounter

## 2020-09-28 NOTE — XMS
Encounter Summary
  Created on: 2020
 
 Marie Fermin
 External Reference #: [id number]
 : [birth year]
 Sex: Female
 
 Demographics
 
 
+-----------------------+------------------------+
| Address               | [address]          |
|                       | [place]  [address]      |
+-----------------------+------------------------+
| Home Phone            | [phone]        |
+-----------------------+------------------------+
| Preferred Language    | Unknown                |
+-----------------------+------------------------+
| Marital Status        |                 |
+-----------------------+------------------------+
| Yazidi Affiliation | Unknown                |
+-----------------------+------------------------+
| Race                  | White                  |
+-----------------------+------------------------+
| Ethnic Group          | Not  or  |
+-----------------------+------------------------+
 
 
 Author
 
 
+--------------+--------------------------------------------+
| Author       | [facility]  |
|              | and [place]                                |
+--------------+--------------------------------------------+
| Organization | [facility]  |
|              | and [place]                                |
+--------------+--------------------------------------------+
| Address      | Unknown                                    |
+--------------+--------------------------------------------+
| Phone        | Unavailable                                |
+--------------+--------------------------------------------+
 
 
 
 Support
 
 
+-----------------+--------------+---------+-----------------+
| Name            | Relationship | Address | Phone           |
+-----------------+--------------+---------+-----------------+
| Rodríguez Fermin | ECON         | Unknown | [phone] |
+-----------------+--------------+---------+-----------------+
 
 
 
 Care Team Providers
 
 
 
+-----------------------+------+-------------+
| Care Team Member Name | Role | Phone       |
+-----------------------+------+-------------+
| No, Physician         | PCP  | Unavailable |
+-----------------------+------+-------------+
 
 
 
 Reason for Visit
 
 
+-----------+------------------------------+
| Reason    | Comments                     |
+-----------+------------------------------+
| Back Pain | Rm 5- Low back pain X 1 week |
+-----------+------------------------------+
 
 
 
 Encounter Details
 
 
+--------+---------+----------------------+----------------------+----------------------+
| Date   | Type    | Department           | Care Team            | Description          |
+--------+---------+----------------------+----------------------+----------------------+
| / | Office  |   [facility]   |   Segundo Boetllo    | Low back pain,       |
|    | Visit   | CARE  [address] | Godwin Samayoa MD      | unspecified back     |
|        |         |   [place]    | [address]       | pain laterality,     |
|        |         | [id number]           | [place]      | unspecified          |
|        |         | [phone]         | [address]  [phone]  | chronicity, with     |
|        |         |                      |  [phone] (Fax)  | sciatica presence    |
|        |         |                      |                      | unspecified (Primary |
|        |         |                      |                      |  Dx); Right flank    |
|        |         |                      |                      | pain                 |
+--------+---------+----------------------+----------------------+----------------------+
 
 
 
 Social History
 
 
+--------------------+-------+-----------+--------+------+
| Tobacco Use        | Types | Packs/Day | Years  | Date |
|                    |       |           | Used   |      |
+--------------------+-------+-----------+--------+------+
| Current Every Day  |       | 1         |        |      |
| Smoker             |       |           |        |      |
+--------------------+-------+-----------+--------+------+
 
 
 
+---------------------+---+---+---+
| Smokeless Tobacco:  |   |   |   |
| Never Used          |   |   |   |
+---------------------+---+---+---+
 
 
 
 
+-------------+-------------+---------+----------+
| Alcohol Use | Drinks/Week | oz/Week | Comments |
+-------------+-------------+---------+----------+
| Not Asked   |             |         |          |
+-------------+-------------+---------+----------+
 
 
 
+------------------+---------------+
| Sex Assigned at  | Date Recorded |
| Birth            |               |
+------------------+---------------+
| Not on file      |               |
+------------------+---------------+
 documented as of this encounter
 
 Last Filed Vital Signs
 
 
+-------------------+----------------------+----------------------+----------+
| Vital Sign        | Reading              | Time Taken           | Comments |
+-------------------+----------------------+----------------------+----------+
| Blood Pressure    | 132/60               | 2016  1:49 PM  |          |
|                   |                      | PDT                  |          |
+-------------------+----------------------+----------------------+----------+
| Pulse             | 89                   | 2016  1:49 PM  |          |
|                   |                      | PDT                  |          |
+-------------------+----------------------+----------------------+----------+
| Temperature       | 36.9   C (98.5   F)  | 2016  1:49 PM  |          |
|                   |                      | PDT                  |          |
+-------------------+----------------------+----------------------+----------+
| Respiratory Rate  | 18                   | 2016  1:49 PM  |          |
|                   |                      | PDT                  |          |
+-------------------+----------------------+----------------------+----------+
| Oxygen Saturation | 97%                  | 2016  1:49 PM  |          |
|                   |                      | PDT                  |          |
+-------------------+----------------------+----------------------+----------+
| Inhaled Oxygen    | -                    | -                    |          |
| Concentration     |                      |                      |          |
+-------------------+----------------------+----------------------+----------+
| Weight            | 110.3 kg (243 lb 3.2 | 2016  1:49 PM  |          |
|                   |  oz)                 | PDT                  |          |
+-------------------+----------------------+----------------------+----------+
| Height            | 162.6 cm (5' 4")     | 2016  1:49 PM  |          |
|                   |                      | PDT                  |          |
+-------------------+----------------------+----------------------+----------+
| Body Mass Index   | 41.75                | 2016  1:49 PM  |          |
|                   |                      | PDT                  |          |
+-------------------+----------------------+----------------------+----------+
 documented in this encounter
 
 Patient Instructions
 Patient Instructions Segundo Botello Jr., MD - 2016  3:57 PM PDTUse heat for 
15-20 minutes 3 or 4 times a day
 Take medication as prescribed
 Have the ultrasound done in call for the report the next day
 Recheck if new symptoms developElectronically signed by Segundo Botello Jr., MD at 0
2016  3:57 PM PDT
 documented in this encounter
 
 
 Progress Notes
 Michelle Larry, Medical Assistant - 2016  3:58 PM PDTVenipuncture to patients Lef
t AC with 23 gauge butterfly needle. One purple, one green and one red top were drawn. Patie
nt tolerated well. Electronically signed by Michelle Larry, Medical Assistant at 20
16  3:58 PM PDTSegundo Botello Jr., MD - 2016  2:25 PM PDTFormatting of this n
ote might be different from the original.
 Marie Fermin presents with a one-week history of mid lumbar back pain extending across
 her back from flank to flank.  She has not had fever.  She has never had a ureteral stone. 
 She has not had diarrhea.  With certain motions her back hurts worse and she at one time fe
lt like she was given a throat well up.  She has not had cough or other symptoms.
 
 Physical exam: No acute distress, nontoxic in appearance
 /60 mmHg | Pulse 89 | Temp(Src) 36.9 C (98.5 F) (Temporal) | Resp 18 | Ht 1.626 m
 (5' 4") | Wt 110.315 kg (243 lb 3.2 oz) | BMI 41.72 kg/m2 | SpO2 97% 
 Back: Tenderness percussion over the mid lumbar area and over the right CVA area 
 chest: Good breath sounds bilaterally without rales rhonchi or wheezes
 Heart: Regular rhythm without murmur gallop or rub
 Abdomen: Soft, bowel sounds normal, no hepatosplenomegaly, no palpable mass, tenderness ove
r the right kidney
 
 Dipstick urine is negative
 LS spine x-ray images reviewed by me show no abnormality
 Recent Results (from the past 24 hour(s)) 
 POCT Urinalysis Dipstick Automated 
 Result Value Ref Range 
  POC COLOR UA Yellow Yellow, Light Yellow 
  POC CLARITY UA Clear  
  POC GLUCOSE UA Negative Negative 
  POC BILIRUBIN UA Negative Negative 
  POC KETONES UA Negative Negative, 100 mg/dL 
  POC SPECIFIC GRAVITY UA 1.010 1.001 - 1.030 
  POC BLOOD UA Negative Negative 
  POC PH UA 7.0 5.0, 6.0, 7.0, 8.0, 5.5, 6.5, 7.5 
  POC PROTEIN UA Negative Negative 
  POC UROBILINOGEN UA 0.2 0.2, Negative, Normal, < 0.2 mg/dL, 1 mg/dL, < 0.2 E.U./dl, 1.0 E.
U./dL, 0.2 mg/dL 
  POC NITRITE UA Negative Negative 
  POC LEUKOCYTE ESTERASE UA Negative Negative 
  RED SUB UA   
  ICTOTEST  Negative 
  REMARK   
 CBC with Differential 
 Result Value Ref Range 
  WBC 10.2 4.0-11.0 K/uL 
  RBC 4.88 3.70-5.20 M/uL 
  Hgb 14.9 11.5-16.0 g/dL 
  Hct 43.2 34.0-47.0 % 
  MCV 88.6 83.0-101.0 fL 
  MCH 30.6 28.0-35.0 pg 
  MCHC 34.5 32.0-36.0 g/dL 
  RDW-CV 13.0 <15.0 % 
  Platelet Count 277 140-440 K/uL 
  MPV 7.5 fL 
  % Neutrophils 63.1 45.0-82.0 % 
  % Lymphocytes 29.8 20.0-45.0 % 
  % Monocytes 6.0 4.0-12.0 % 
  % Eosinophils 0.8 0.0-5.0 % 
  % Basophils 0.3 0.0-1.0 % 
  Absolute Neutrophils 6.40 1.80-8.50 K/uL 
  Absolute Lymphocytes 3.00 0.60-3.20 K/uL 
 
  Absolute Monocytes 0.60 0.00-1.00 K/uL 
  Absolute Eosinophils 0.10 0.00-0.40 K/uL 
  Absolute Basophils 0.00 0.00-0.10 K/uL 
 Comprehensive Metabolic Panel 
 Result Value Ref Range 
   136-149 mmol/L 
  K 3.8 3.5-5.1 mmol/L 
    mmol/L 
  CO2 27 24-31 mmol/L 
  ANION GAP 9 3-16 mmol/L 
  GLUCOSE 96  mg/dL 
  BUN 5 (L) 7-18 mg/dL 
  Creatinine, Serum/Plasma 0.54 (L) 0.60-1.30 mg/dL 
  eGFR if not AFR[place] AMERICAN >60 >=60 mL/min/1.73m2 
  CALCIUM 9.1 8.3-10.5 mg/dL 
  ALBUMIN 4.2 3.2-5.0 g/dL 
  BILIRUBIN TOTAL 0.6 0.1-1.5 mg/dL 
  Total protein 7.0 6.0-7.8 g/dL 
  AST 20 10-42 U/L 
  ALT 16 6-45 U/L 
  ALK PHOS 79  U/L 
  GLOBULIN 2.8 2.1-3.8 g/dL 
  Albumin/Globulin ratio 1.5 0.8-2.0 
  BUN/CREA 9.3  
 Sedimentation Rate 
 Result Value Ref Range 
  ESR 5 <20 mm/hr 
 Extra Plain Red Top Tube 
 Result Value Ref Range 
  Extra Plain Red Top Tube Done  
   
 
 Diagnosis:   Lumbar strain, right flank pain
 
 Plan:     Robaxin, tramadol, right abdominal ultrasound-call for the results the next day, 
heat, recheck any new symptoms                              
 
 16  Patient called with persistent pain.  Advised to go to the ER if severe.  No work 
for 3 days.Electronically signed by Segundo Botello Jr., MD at 2016 10:46 AM PD
Tdocumented in this encounter
 
 Plan of Treatment
 Not on filedocumented as of this encounter
 
 Procedures
 
 
+----------------------+--------+-------------+----------------------+----------------------
+
| Procedure Name       | Priori | Date/Time   | Associated Diagnosis | Comments             
|
|                      | ty     |             |                      |                      
|
+----------------------+--------+-------------+----------------------+----------------------
+
| US ABDOMEN LIMITED   | Routin | 04/15/2016  |   Right flank pain   |   Results for this   
|
|                      | e      |  8:37 AM    |                      | procedure are in the 
|
|                      |        | PDT         |                      |  results section.    
 
|
+----------------------+--------+-------------+----------------------+----------------------
+
| XR LUMBAR SPINE 2 OR | Routin | 2016  |   Low back pain,     |   Results for this   
|
|  3 VW                | e      |  3:11 PM    | unspecified back     | procedure are in the 
|
|                      |        | PDT         | pain laterality,     |  results section.    
|
|                      |        |             | unspecified          |                      
|
|                      |        |             | chronicity, with     |                      
|
|                      |        |             | sciatica presence    |                      
|
|                      |        |             | unspecified  Right   |                      
|
|                      |        |             | flank pain           |                      
|
+----------------------+--------+-------------+----------------------+----------------------
+
| EXTRA PLAIN RED TOP  | Routin | 2016  |   Low back pain,     |   Results for this   
|
|                      | e      |  2:56 PM    | unspecified back     | procedure are in the 
|
|                      |        | PDT         | pain laterality,     |  results section.    
|
|                      |        |             | unspecified          |                      
|
|                      |        |             | chronicity, with     |                      
|
|                      |        |             | sciatica presence    |                      
|
|                      |        |             | unspecified  Right   |                      
|
|                      |        |             | flank pain           |                      
|
+----------------------+--------+-------------+----------------------+----------------------
+
| SEDIMENTATION RATE   | Routin | 2016  |   Low back pain,     |   Results for this   
|
|                      | e      |  2:50 PM    | unspecified back     | procedure are in the 
|
|                      |        | PDT         | pain laterality,     |  results section.    
|
|                      |        |             | unspecified          |                      
|
|                      |        |             | chronicity, with     |                      
|
|                      |        |             | sciatica presence    |                      
|
|                      |        |             | unspecified          |                      
|
+----------------------+--------+-------------+----------------------+----------------------
+
| CBC WITH             | Routin | 2016  |   Low back pain,     |   Results for this   
|
| DIFFERENTIAL         | e      |  2:50 PM    | unspecified back     | procedure are in the 
|
|                      |        | PDT         | pain laterality,     |  results section.    
 
|
|                      |        |             | unspecified          |                      
|
|                      |        |             | chronicity, with     |                      
|
|                      |        |             | sciatica presence    |                      
|
|                      |        |             | unspecified  Right   |                      
|
|                      |        |             | flank pain           |                      
|
+----------------------+--------+-------------+----------------------+----------------------
+
| COMPREHENSIVE        | Routin | 2016  |   Low back pain,     |   Results for this   
|
| METABOLIC PANEL      | e      |  2:50 PM    | unspecified back     | procedure are in the 
|
|                      |        | PDT         | pain laterality,     |  results section.    
|
|                      |        |             | unspecified          |                      
|
|                      |        |             | chronicity, with     |                      
|
|                      |        |             | sciatica presence    |                      
|
|                      |        |             | unspecified  Right   |                      
|
|                      |        |             | flank pain           |                      
|
+----------------------+--------+-------------+----------------------+----------------------
+
| POCT URINALYSIS,     | Routin | 2016  |   Low back pain,     |   Results for this   
|
| AUTO WITH CONF       | e      |  2:14 PM    | unspecified back     | procedure are in the 
|
|                      |        | PDT         | pain laterality,     |  results section.    
|
|                      |        |             | unspecified          |                      
|
|                      |        |             | chronicity, with     |                      
|
|                      |        |             | sciatica presence    |                      
|
|                      |        |             | unspecified          |                      
|
+----------------------+--------+-------------+----------------------+----------------------
+
 documented in this encounter
 
 Results
 US Abdomen Limited (04/15/2016  8:37 AM PDT)
 
+----------+
| Specimen |
+----------+
|          |
+----------+
 
 
 
 
+------------------------------------------------------------------------+-----------------+
| Narrative                                                              | Performed At    |
+------------------------------------------------------------------------+-----------------+
|   EXAM: US ABDOMEN LIMITED dated 4/15/2016 8:06 AM     HISTORY: right  Svetl[facility]NAN[facility]    |
| upper abdominal and flank pain     COMPARISON: None     FINDINGS:      | [facility]        |
| Liver: The liver is normal in size contour and echogenicity.   There   | [facility]  |
| is no  intrahepatic biliary ductal dilatation.        Gallbladder and  | - IMAGING       |
| bile ducts: The gallbladder is not distended.   No  cholelithiasis.    |                 |
|   No pericholecystic fluid.   The sonographic Hutchinson's reportedly      |                 |
| negative.   The gallbladder wall measures 1.8 mm.   The common bile    |                 |
| duct measures  4.4 mm.   The common hepatic duct measures 2.8 mm.      |                 |
| Pancreas: The pancreas as visualized is unremarkable.     Right        |                 |
| kidney: The right kidney as visualized is unremarkable.   There is no  |                 |
|  hydronephrosis.   The right kidney measures 13.1 cm longitudinally.   |                 |
|    Vascular: There is flow demonstrated in the sampled portions of the |                 |
|  inferior  vena cava, hepatic veins, portal veins, and the splenic     |                 |
| vein.   Portal venous  flow is toward the liver.     No visible        |                 |
| ascites.     IMPRESSION -      Unremarkable right upper quadrant       |                 |
| ultrasound.      Dictated and Signed by: Homer Vazquez MD           |                 |
| Electronically signed: 4/15/2016 9:12 AM                               |                 |
+------------------------------------------------------------------------+-----------------+
 
 
 
+------------------------------------------------------------------------------------------+
| Procedure Note                                                                           |
+------------------------------------------------------------------------------------------+
|   [place], Rad Results In - 04/15/2016  9:15 AM PDT  EXAM: US ABDOMEN LIMITED dated          |
| 4/15/2016 8:06 AMHISTORY: right upper abdominal and flank painCOMPARISON:                |
| NoneFINDINGS:Liver: The liver is normal in size contour and echogenicity.  There is      |
| nointrahepatic biliary ductal dilatation.  Gallbladder and bile ducts: The gallbladder   |
| is not distended.  Nocholelithiasis.  No pericholecystic fluid.  The sonographic         |
| Hutchinson's reportedlynegative.  The gallbladder wall measures 1.8 mm.  The common bile     |
| duct measures4.4 mm.  The common hepatic duct measures 2.8 mm.Pancreas: The pancreas as  |
| visualized is unremarkable.Right kidney: The right kidney as visualized is unremarkable. |
|   There is nohydronephrosis.  The right kidney measures 13.1 cm longitudinally.Vascular: |
|  There is flow demonstrated in the sampled portions of the inferiorvena cava, hepatic    |
| veins, portal veins, and the splenic vein.  Portal venousflow is toward the liver.No     |
| visible ascites.IMPRESSION - Unremarkable right upper quadrant ultrasound. Dictated and  |
| Signed by: Homer Vazquez MD  Electronically signed: 4/15/2016 9:12 AM                 |
|negative.  The gallbladder wall measures 1.8 mm.  The common bile duct measures          |
|4.4 mm.  The common hepatic duct measures 2.8 mm.                                         |
|                                                                                          |
|Pancreas: The pancreas as visualized is unremarkable.                                     |
|                                                                                          |
|Right kidney: The right kidney as visualized is unremarkable.  There is no                |
|hydronephrosis.  The right kidney measures 13.1 cm longitudinally.                       |
|                                                                                          |
|Vascular: There is flow demonstrated in the sampled portions of the inferior              |
|vena cava, hepatic veins, portal veins, and the splenic vein.  Portal venous              |
|flow is toward the liver.                                                                 |
|                                                                                          |
|No visible ascites.                                                                       |
|                                                                                          |
|IMPRESSION -                                                                              |
|                                                                                          |
|Unremarkable right upper quadrant ultrasound.                                             |
|                                                                                          |
|Dictated and Signed by: Homer Vazquez MD                                               |
| Electronically signed: 4/15/2016 9:12 AM                                                 |
 
+------------------------------------------------------------------------------------------+
 
 
 
+----------------------+---------------------+--------------------+----------------+
| Performing           | Address             | City/State/Zipcode | Phone Number   |
| Organization         |                     |                    |                |
+----------------------+---------------------+--------------------+----------------+
|   [facility]     |   [address] |   [place]  |   [phone] |
| [facility]  |                     | [address]              |                |
| - IMAGING            |                     |                    |                |
+----------------------+---------------------+--------------------+----------------+
 XR Lumbar Spine 2 or 3 Vw (2016  3:11 PM PDT)
 
+----------+
| Specimen |
+----------+
|          |
+----------+
 
 
 
+------------------------------------------------------------------------+-----------------+
| Narrative                                                              | Performed At    |
+------------------------------------------------------------------------+-----------------+
|   XR LUMBAR SPINE 2 OR 3 VW 2016 3:11 PM     HISTORY: pain.       |   [facility]    |
| COMPARISON: None.     FINDINGS:  There are no acute osseous findings.  | [facility]        |
| Vertebral body height are preserved with no  evidence for compression  | [facility]  |
| fractures. Disc height are maintained. Facet joints are  intact.       | - IMAGING       |
| Visualized ribs and pelvic osseous structures show no acute findings.  |                 |
|  Soft tissue structures are unremarkable.     IMPRESSION -  No acute   |                 |
| findings.     Dictated and Signed by: Bryson Nicholas MD                 |                 |
| Electronically signed: 2016 4:46 PM                               |                 |
+------------------------------------------------------------------------+-----------------+
 
 
 
+------------------------------------------------------------------------------------------+
| Procedure Note                                                                           |
+------------------------------------------------------------------------------------------+
|   [place], Rad Results In - 2016  4:49 PM PDT  XR LUMBAR SPINE 2 OR 3 VW 2016     |
| 3:11 PMHISTORY: pain.COMPARISON: None.FINDINGS:There are no acute osseous findings.      |
| Vertebral body height are preserved with noevidence for compression fractures. Disc      |
| height are maintained. Facet joints areintact. Visualized ribs and pelvic osseous        |
| structures show no acute findings.Soft tissue structures are unremarkable.IMPRESSION -No |
|  acute findings.Dictated and Signed by: Bryson Nicholas MD  Electronically signed:          |
| 2016 4:46 PM                                                                        |
|There are no acute osseous findings. Vertebral body height are preserved with no          |
|evidence for compression fractures. Disc height are maintained. Facet joints are          |
|intact. Visualized ribs and pelvic osseous structures show no acute findings.            |
|Soft tissue structures are unremarkable.                                                  |
|                                                                                          |
|IMPRESSION -                                                                              |
|No acute findings.                                                                        |
|                                                                                          |
|Dictated and Signed by: Bryson Nicholas MD                                                   |
| Electronically signed: 2016 4:46 PM                                                 |
+------------------------------------------------------------------------------------------+
 
 
 
 
+----------------------+---------------------+--------------------+----------------+
| Performing           | Address             | City/State/Zipcode | Phone Number   |
| Organization         |                     |                    |                |
+----------------------+---------------------+--------------------+----------------+
|   [facility]     |   [address] |   [place], [place]  |   [phone] |
| [facility]  |                     | [address]              |                |
| - IMAGING            |                     |                    |                |
+----------------------+---------------------+--------------------+----------------+
 Extra Plain Red Top Tube (2016  2:56 PM PDT)
 
+-------------+-------+-----------+-------------+--------------+
| Component   | Value | Ref Range | Performed   | Pathologist  |
|             |       |           | At          | Signature    |
+-------------+-------+-----------+-------------+--------------+
| Extra Plain | Done  |           | [facility]  |              |
|  Red Top    |       |           | [facility]    |              |
| Tube        |       |           | MEDICAL     |              |
|             |       |           | CENTER -    |              |
|             |       |           | LABORATORY  |              |
+-------------+-------+-----------+-------------+--------------+
 
 
 
+----------+
| Specimen |
+----------+
| Blood    |
+----------+
 
 
 
+----------------------+--------------------+--------------------+----------------+
| Performing           | Address            | City/State/Zipcode | Phone Number   |
| Organization         |                    |                    |                |
+----------------------+--------------------+--------------------+----------------+
|   [facility]     |   [address] |   [place]  |   [phone] |
| [facility]  |                    | [address]              |                |
| - LABORATORY         |                    |                    |                |
+----------------------+--------------------+--------------------+----------------+
 Sedimentation Rate (2016  2:50 PM PDT)
 
+-------------+-------+-----------+-------------+--------------+
| Component   | Value | Ref Range | Performed   | Pathologist  |
|             |       |           | At          | Signature    |
+-------------+-------+-----------+-------------+--------------+
| Erythrocyte | 5     | <20 mm/hr | [facility]  |              |
|             |       |           | [facility]    |              |
| Sedimentati |       |           | MEDICAL     |              |
| on Rate     |       |           | CENTER -    |              |
|             |       |           | LABORATORY  |              |
+-------------+-------+-----------+-------------+--------------+
 
 
 
+----------+
| Specimen |
+----------+
| Blood    |
+----------+
 
 
 
 
+----------------------+--------------------+--------------------+----------------+
| Performing           | Address            | City/State/Zipcode | Phone Number   |
| Organization         |                    |                    |                |
+----------------------+--------------------+--------------------+----------------+
|   [facility]     |   [address] |   [place]  |   [phone] |
| [facility]  |                    | [address]              |                |
| - LABORATORY         |                    |                    |                |
+----------------------+--------------------+--------------------+----------------+
 Comprehensive Metabolic Panel (2016  2:50 PM PDT)
 
+-------------+--------------------------+-----------------+-------------+--------------+
| Component   | Value                    | Ref Range       | Performed   | Pathologist  |
|             |                          |                 | At          | Signature    |
+-------------+--------------------------+-----------------+-------------+--------------+
| Na          | 140                      | 136 - 149       | [facility]  |              |
|             |                          | mmol/L          | [facility]    |              |
|             |                          |                 | MEDICAL     |              |
|             |                          |                 | CENTER -    |              |
|             |                          |                 | LABORATORY  |              |
+-------------+--------------------------+-----------------+-------------+--------------+
| K           | 3.8                      | 3.5 - 5.1       | [facility]  |              |
|             |                          | mmol/L          | [facility]    |              |
|             |                          |                 | MEDICAL     |              |
|             |                          |                 | CENTER -    |              |
|             |                          |                 | LABORATORY  |              |
+-------------+--------------------------+-----------------+-------------+--------------+
| Cl          | 104                      | 98 - 109 mmol/L | [facility]  |              |
|             |                          |                 | [facility]    |              |
|             |                          |                 | MEDICAL     |              |
|             |                          |                 | CENTER -    |              |
|             |                          |                 | LABORATORY  |              |
+-------------+--------------------------+-----------------+-------------+--------------+
| CO2         | 27                       | 24 - 31 mmol/L  | [facility]  |              |
|             |                          |                 | [facility]    |              |
|             |                          |                 | MEDICAL     |              |
|             |                          |                 | CENTER -    |              |
|             |                          |                 | LABORATORY  |              |
+-------------+--------------------------+-----------------+-------------+--------------+
| Anion Gap   | 9                        | 3 - 16 mmol/L   | [facility]  |              |
|             |                          |                 | [facility]    |              |
|             |                          |                 | MEDICAL     |              |
|             |                          |                 | CENTER -    |              |
|             |                          |                 | LABORATORY  |              |
+-------------+--------------------------+-----------------+-------------+--------------+
| Glucose     | 96                       | 70 - 109 mg/dL  | [facility]  |              |
|             |                          |                 | [facility]    |              |
|             |                          |                 | MEDICAL     |              |
|             |                          |                 | CENTER -    |              |
|             |                          |                 | LABORATORY  |              |
+-------------+--------------------------+-----------------+-------------+--------------+
| BUN         | 5 (L)                    | 7 - 18 mg/dL    | [facility]  |              |
|             |                          |                 | [facility]    |              |
|             |                          |                 | MEDICAL     |              |
|             |                          |                 | CENTER -    |              |
|             |                          |                 | LABORATORY  |              |
+-------------+--------------------------+-----------------+-------------+--------------+
| Creatinine  | 0.54 (L)                 | 0.60 - 1.30     | [facility]  |              |
 
|             |                          | mg/dL           | [facility]    |              |
|             |                          |                 | MEDICAL     |              |
|             |                          |                 | CENTER -    |              |
|             |                          |                 | LABORATORY  |              |
+-------------+--------------------------+-----------------+-------------+--------------+
| eGFR,       | >60Comment: GLOMERULAR   | >=60            | [facility]  |              |
| non- | FILTRATION               | mL/min/1.73m2   | [facility]    |              |
|  American   | RATE,ESTIMATED           |                 | MEDICAL     |              |
|             |   mL/min/1.26r2Ytca than |                 | CENTER -    |              |
|             |  60    Chronic kidney    |                 | LABORATORY  |              |
|             | disease,if found over a  |                 |             |              |
|             | 3-month period.Less than |                 |             |              |
|             |  15    Kidney failureFor |                 |             |              |
|             |                   |                 |             |              |
|             | Americans,multiply the   |                 |             |              |
|             | calculated GFR by 1.21.  |                 |             |              |
|             |                          |                 |             |              |
+-------------+--------------------------+-----------------+-------------+--------------+
| Calcium     | 9.1                      | 8.3 - 10.5      | [facility]  |              |
|             |                          | mg/dL           | [facility]    |              |
|             |                          |                 | MEDICAL     |              |
|             |                          |                 | CENTER -    |              |
|             |                          |                 | LABORATORY  |              |
+-------------+--------------------------+-----------------+-------------+--------------+
| Albumin     | 4.2                      | 3.2 - 5.0 g/dL  | [facility]  |              |
|             |                          |                 | [facility]    |              |
|             |                          |                 | MEDICAL     |              |
|             |                          |                 | CENTER -    |              |
|             |                          |                 | LABORATORY  |              |
+-------------+--------------------------+-----------------+-------------+--------------+
| Bilirubin   | 0.6                      | 0.1 - 1.5 mg/dL | [facility]  |              |
| Total       |                          |                 | [facility]    |              |
|             |                          |                 | MEDICAL     |              |
|             |                          |                 | CENTER -    |              |
|             |                          |                 | LABORATORY  |              |
+-------------+--------------------------+-----------------+-------------+--------------+
| Total       | 7.0                      | 6.0 - 7.8 g/dL  | [facility]  |              |
| Protein     |                          |                 | [facility]    |              |
|             |                          |                 | MEDICAL     |              |
|             |                          |                 | CENTER -    |              |
|             |                          |                 | LABORATORY  |              |
+-------------+--------------------------+-----------------+-------------+--------------+
| AST         | 20                       | 10 - 42 U/L     | [facility]  |              |
|             |                          |                 | [facility]    |              |
|             |                          |                 | MEDICAL     |              |
|             |                          |                 | CENTER -    |              |
|             |                          |                 | LABORATORY  |              |
+-------------+--------------------------+-----------------+-------------+--------------+
| ALT         | 16                       | 6 - 45 U/L      | [facility]  |              |
|             |                          |                 | [facility]    |              |
|             |                          |                 | MEDICAL     |              |
|             |                          |                 | CENTER -    |              |
|             |                          |                 | LABORATORY  |              |
+-------------+--------------------------+-----------------+-------------+--------------+
| Alkaline    | 79                       | 40 - 110 U/L    | [facility]  |              |
| Phosphatase |                          |                 | [facility]    |              |
|             |                          |                 | MEDICAL     |              |
|             |                          |                 | CENTER -    |              |
|             |                          |                 | LABORATORY  |              |
+-------------+--------------------------+-----------------+-------------+--------------+
 
| Globulin    | 2.8                      | 2.1 - 3.8 g/dL  | [facility]  |              |
|             |                          |                 | [facility]    |              |
|             |                          |                 | MEDICAL     |              |
|             |                          |                 | CENTER -    |              |
|             |                          |                 | LABORATORY  |              |
+-------------+--------------------------+-----------------+-------------+--------------+
| Albumin/[place] | 1.5                      | 0.8 - 2.0       | [facility]  |              |
| bulin Ratio |                          |                 | [facility]    |              |
|             |                          |                 | MEDICAL     |              |
|             |                          |                 | CENTER -    |              |
|             |                          |                 | LABORATORY  |              |
+-------------+--------------------------+-----------------+-------------+--------------+
| BUN/Creatin | 9.3                      |                 | [facility]  |              |
| ine Ratio   |                          |                 | [facility]    |              |
|             |                          |                 | MEDICAL     |              |
|             |                          |                 | CENTER -    |              |
|             |                          |                 | LABORATORY  |              |
+-------------+--------------------------+-----------------+-------------+--------------+
 
 
 
+----------+
| Specimen |
+----------+
| Blood    |
+----------+
 
 
 
+----------------------+--------------------+--------------------+----------------+
| Performing           | Address            | City/State/Zipcode | Phone Number   |
| Organization         |                    |                    |                |
+----------------------+--------------------+--------------------+----------------+
|   [facility]     |   [address] |   [place]  |   [phone] |
| [facility]  |                    | [address]              |                |
| - LABORATORY         |                    |                    |                |
+----------------------+--------------------+--------------------+----------------+
 CBC with Differential (2016  2:50 PM PDT)
 
+-------------+-------+-----------------+-------------+--------------+
| Component   | Value | Ref Range       | Performed   | Pathologist  |
|             |       |                 | At          | Signature    |
+-------------+-------+-----------------+-------------+--------------+
| White Blood | 10.2  | 4.0 - 11.0 K/uL | [facility]  |              |
|  Cells      |       |                 | [facility]    |              |
|             |       |                 | MEDICAL     |              |
|             |       |                 | CENTER -    |              |
|             |       |                 | LABORATORY  |              |
+-------------+-------+-----------------+-------------+--------------+
| Red Blood   | 4.88  | 3.70 - 5.20     | [facility]  |              |
| Cells       |       | M/uL            | [facility]    |              |
|             |       |                 | MEDICAL     |              |
|             |       |                 | CENTER -    |              |
|             |       |                 | LABORATORY  |              |
+-------------+-------+-----------------+-------------+--------------+
| Hemoglobin  | 14.9  | 11.5 - 16.0     | [facility]  |              |
|             |       | g/dL            | [facility]    |              |
|             |       |                 | MEDICAL     |              |
|             |       |                 | CENTER -    |              |
|             |       |                 | LABORATORY  |              |
 
+-------------+-------+-----------------+-------------+--------------+
| Hematocrit  | 43.2  | 34.0 - 47.0 %   | [facility]  |              |
|             |       |                 | [facility]    |              |
|             |       |                 | MEDICAL     |              |
|             |       |                 | CENTER -    |              |
|             |       |                 | LABORATORY  |              |
+-------------+-------+-----------------+-------------+--------------+
| MCV         | 88.6  | 83.0 - 101.0 fL | [facility]  |              |
|             |       |                 | [facility]    |              |
|             |       |                 | MEDICAL     |              |
|             |       |                 | CENTER -    |              |
|             |       |                 | LABORATORY  |              |
+-------------+-------+-----------------+-------------+--------------+
| MCH         | 30.6  | 28.0 - 35.0 pg  | [facility]  |              |
|             |       |                 | [facility]    |              |
|             |       |                 | MEDICAL     |              |
|             |       |                 | CENTER -    |              |
|             |       |                 | LABORATORY  |              |
+-------------+-------+-----------------+-------------+--------------+
| MCHC        | 34.5  | 32.0 - 36.0     | [facility]  |              |
|             |       | g/dL            | [facility]    |              |
|             |       |                 | MEDICAL     |              |
|             |       |                 | CENTER -    |              |
|             |       |                 | LABORATORY  |              |
+-------------+-------+-----------------+-------------+--------------+
| RDW-CV      | 13.0  | <15.0 %         | [facility]  |              |
|             |       |                 | [facility]    |              |
|             |       |                 | MEDICAL     |              |
|             |       |                 | CENTER -    |              |
|             |       |                 | LABORATORY  |              |
+-------------+-------+-----------------+-------------+--------------+
| Platelet    | 277   | 140 - 440 K/uL  | [facility]  |              |
| Count       |       |                 | [facility]    |              |
|             |       |                 | MEDICAL     |              |
|             |       |                 | CENTER -    |              |
|             |       |                 | LABORATORY  |              |
+-------------+-------+-----------------+-------------+--------------+
| MPV         | 7.5   | fL              | [facility]  |              |
|             |       |                 | [facility]    |              |
|             |       |                 | MEDICAL     |              |
|             |       |                 | CENTER -    |              |
|             |       |                 | LABORATORY  |              |
+-------------+-------+-----------------+-------------+--------------+
| %           | 63.1  | 45.0 - 82.0 %   | [facility]  |              |
| Neutrophils |       |                 | [facility]    |              |
|             |       |                 | MEDICAL     |              |
|             |       |                 | CENTER -    |              |
|             |       |                 | LABORATORY  |              |
+-------------+-------+-----------------+-------------+--------------+
| %           | 29.8  | 20.0 - 45.0 %   | [facility]  |              |
| Lymphocytes |       |                 | [facility]    |              |
|             |       |                 | MEDICAL     |              |
|             |       |                 | CENTER -    |              |
|             |       |                 | LABORATORY  |              |
+-------------+-------+-----------------+-------------+--------------+
| % Monocytes | 6.0   | 4.0 - 12.0 %    | [facility]  |              |
|             |       |                 | [facility]    |              |
|             |       |                 | [facility]     |              |
|             |       |                 | [facility] -    |              |
|             |       |                 | LABORATORY  |              |
 
+-------------+-------+-----------------+-------------+--------------+
| %           | 0.8   | 0.0 - 5.0 %     | [facility]  |              |
| Eosinophils |       |                 | [facility]    |              |
|             |       |                 | [facility]     |              |
|             |       |                 | CENTER -    |              |
|             |       |                 | LABORATORY  |              |
+-------------+-------+-----------------+-------------+--------------+
| % Basophils | 0.3   | 0.0 - 1.0 %     | [facility]  |              |
|             |       |                 | [facility]    |              |
|             |       |                 | [facility]     |              |
|             |       |                 | CENTER -    |              |
|             |       |                 | LABORATORY  |              |
+-------------+-------+-----------------+-------------+--------------+
| Absolute    | 6.40  | 1.80 - 8.50     | [facility]  |              |
| Neutrophils |       | K/uL            | [facility]    |              |
|             |       |                 | [facility]     |              |
|             |       |                 | CENTER -    |              |
|             |       |                 | LABORATORY  |              |
+-------------+-------+-----------------+-------------+--------------+
| Absolute    | 3.00  | 0.60 - 3.20     | [facility]  |              |
| Lymphocytes |       | K/uL            | [facility]    |              |
|             |       |                 | [facility]     |              |
|             |       |                 | CENTER -    |              |
|             |       |                 | LABORATORY  |              |
+-------------+-------+-----------------+-------------+--------------+
| Absolute    | 0.60  | 0.00 - 1.00     | [facility]  |              |
| Monocytes   |       | K/uL            | [facility]    |              |
|             |       |                 | [facility]     |              |
|             |       |                 | CENTER -    |              |
|             |       |                 | LABORATORY  |              |
+-------------+-------+-----------------+-------------+--------------+
| Absolute    | 0.10  | 0.00 - 0.40     | [facility]  |              |
| Eosinophils |       | K/uL            | [facility]    |              |
|             |       |                 | [facility]     |              |
|             |       |                 | CENTER -    |              |
|             |       |                 | LABORATORY  |              |
+-------------+-------+-----------------+-------------+--------------+
| Absolute    | 0.00  | 0.00 - 0.10     | [facility]  |              |
| Basophils   |       | K/uL            | [facility]    |              |
|             |       |                 | [facility]     |              |
|             |       |                 | CENTER -    |              |
|             |       |                 | LABORATORY  |              |
+-------------+-------+-----------------+-------------+--------------+
 
 
 
+----------+
| Specimen |
+----------+
| Blood    |
+----------+
 
 
 
+----------------------+--------------------+--------------------+----------------+
| Performing           | Address            | City/State/Zipcode | Phone Number   |
| Organization         |                    |                    |                |
+----------------------+--------------------+--------------------+----------------+
|   [facility]     |   [address] |   [place]  |   [phone] |
| [facility]  |                    | [id number]              |                |
 
| - LABORATORY         |                    |                    |                |
+----------------------+--------------------+--------------------+----------------+
 POCT Urinalysis Dipstick Automated (2016  2:14 PM PDT)
 
+-------------+----------+-----------------+------------+--------------+
| Component   | Value    | Ref Range       | Performed  | Pathologist  |
|             |          |                 | At         | Signature    |
+-------------+----------+-----------------+------------+--------------+
| Color, UA,  | Yellow   | Yellow, Light   |            |              |
| POC         |          | Yellow          |            |              |
+-------------+----------+-----------------+------------+--------------+
| Clarity,    | Clear    |                 |            |              |
| UA, POC     |          |                 |            |              |
+-------------+----------+-----------------+------------+--------------+
| Glucose,    | Negative | Negative        |            |              |
| UA, POC     |          |                 |            |              |
+-------------+----------+-----------------+------------+--------------+
| Bilirubin,  | Negative | Negative        |            |              |
| UA, POC     |          |                 |            |              |
+-------------+----------+-----------------+------------+--------------+
| Ketones,    | Negative | Negative, 100   |            |              |
| UA, POC     |          | mg/dL           |            |              |
+-------------+----------+-----------------+------------+--------------+
| Specific    | 1.010    | 1.001 - 1.030   |            |              |
| Gravity,    |          |                 |            |              |
| UA, POC     |          |                 |            |              |
+-------------+----------+-----------------+------------+--------------+
| Blood, UA,  | Negative | Negative        |            |              |
| POC         |          |                 |            |              |
+-------------+----------+-----------------+------------+--------------+
| pH, UA, POC | 7.0      | 5.0, 6.0, 7.0,  |            |              |
|             |          | 8.0, 5.5, 6.5,  |            |              |
|             |          | 7.5             |            |              |
+-------------+----------+-----------------+------------+--------------+
| Protein,    | Negative | Negative        |            |              |
| UA, POC     |          |                 |            |              |
+-------------+----------+-----------------+------------+--------------+
| Urobilinoge | 0.2      | 0.2, Negative,  |            |              |
| n, UA, POC  |          | Normal, < 0.2   |            |              |
|             |          | mg/dL, 1 mg/dL, |            |              |
|             |          |  < 0.2 E.U./dl, |            |              |
|             |          |  1.0 E.U./dL,   |            |              |
|             |          | 0.2 mg/dL       |            |              |
+-------------+----------+-----------------+------------+--------------+
| Nitrite,    | Negative | Negative        |            |              |
| UA, POC     |          |                 |            |              |
+-------------+----------+-----------------+------------+--------------+
| Leukocyte   | Negative | Negative        |            |              |
| Esterase,   |          |                 |            |              |
| UA, POC     |          |                 |            |              |
+-------------+----------+-----------------+------------+--------------+
| Reducing    |          |                 |            |              |
| Substances, |          |                 |            |              |
|  Urine      |          |                 |            |              |
+-------------+----------+-----------------+------------+--------------+
| Bilirubin   |          | Negative        |            |              |
| Confirmatio |          |                 |            |              |
| n by        |          |                 |            |              |
| Ictotest,   |          |                 |            |              |
| Urine       |          |                 |            |              |
 
+-------------+----------+-----------------+------------+--------------+
| Remark      |          |                 |            |              |
+-------------+----------+-----------------+------------+--------------+
 
 
 
+-----------------+
| Specimen        |
+-----------------+
| Urine specimen  |
| (specimen)      |
+-----------------+
 documented in this encounter
 
 Visit Diagnoses
 
 
+------------------------------------------------------------------------------------------+
| Diagnosis                                                                                |
+------------------------------------------------------------------------------------------+
|   Low back pain, unspecified back pain laterality, unspecified chronicity, with sciatica |
|  presence unspecified - Primary                                                          |
+------------------------------------------------------------------------------------------+
|   Right flank pain  Abdominal pain, unspecified site                                     |
+------------------------------------------------------------------------------------------+
 documented in this encounter

## 2020-09-28 NOTE — XMS
Encounter Summary
  Created on: 2020
 
 Marie Fermin
 External Reference #: [id number]
 : [birth year]
 Sex: Female
 
 Demographics
 
 
+-----------------------+------------------------+
| Address               | [address]          |
|                       | [place]  [address]      |
+-----------------------+------------------------+
| Home Phone            | [phone]        |
+-----------------------+------------------------+
| Preferred Language    | Unknown                |
+-----------------------+------------------------+
| Marital Status        |                 |
+-----------------------+------------------------+
| Temple Affiliation | Unknown                |
+-----------------------+------------------------+
| Race                  | White                  |
+-----------------------+------------------------+
| Ethnic Group          | Not  or  |
+-----------------------+------------------------+
 
 
 Author
 
 
+--------------+--------------------------------------------+
| Author       | [facility]  |
|              | and [place]                                |
+--------------+--------------------------------------------+
| Organization | [facility]  |
|              | and [place]                                |
+--------------+--------------------------------------------+
| Address      | Unknown                                    |
+--------------+--------------------------------------------+
| Phone        | Unavailable                                |
+--------------+--------------------------------------------+
 
 
 
 Support
 
 
+-----------------+--------------+---------+-----------------+
| Name            | Relationship | Address | Phone           |
+-----------------+--------------+---------+-----------------+
| Rodríguez Fermin | ECON         | Unknown | [phone] |
+-----------------+--------------+---------+-----------------+
 
 
 
 Care Team Providers
 
 
 
+-----------------------+------+-------------+
| Care Team Member Name | Role | Phone       |
+-----------------------+------+-------------+
| No, Physician         | PCP  | Unavailable |
+-----------------------+------+-------------+
 
 
 
 Reason for Referral
 Evaluate & Treat (Routine)
 
+--------+--------------+---------------+--------------+--------------+---------------+
| Status | Reason       | Specialty     | Diagnoses /  | Referred By  | Referred To   |
|        |              |               | Procedures   | Contact      | Contact       |
+--------+--------------+---------------+--------------+--------------+---------------+
| Closed |   Specialty  | Physical      |   Diagnoses  |              |   [facility] |
|        | Services     | Therapy /     |  Right-sided | [facility] |  Pt Op  [address] |
|        | Required     | Rehabilitatio |  low back    | , Braxton DURAN MD |  [facility]       |
|        |              | n             | pain with    |   [address]  | [place],  |
|        |              |               | right-sided  | [address]  [place]   | [place] [address] |
|        |              |               | sciatica,    | [place]    |   Phone:      |
|        |              |               | unspecified  | [address]        | [phone]  |
|        |              |               | chronicity   | Phone:       |  Fax:         |
|        |              |               | It band      | [phone] | [phone]  |
|        |              |               | syndrome,    |   Fax:       |               |
|        |              |               | right        | [phone] |               |
|        |              |               | M54.41       |              |               |
|        |              |               | (ICD-10-CM)  |              |               |
|        |              |               | - 724.3      |              |               |
|        |              |               | (ICD-9-CM) - |              |               |
|        |              |               |  Right-sided |              |               |
|        |              |               |  low back    |              |               |
|        |              |               | pain with    |              |               |
|        |              |               | right-sided  |              |               |
|        |              |               | sciatica,    |              |               |
|        |              |               | unspecified  |              |               |
|        |              |               | chronicity   |              |               |
|        |              |               | Procedures   |              |               |
|        |              |               | pt eval      |              |               |
+--------+--------------+---------------+--------------+--------------+---------------+
 
 
 
 
 Reason for Visit
 
 
+-----------+-------------------------------------------------------------------+
| Reason    | Comments                                                          |
+-----------+-------------------------------------------------------------------+
| Follow-up | Rm 3- follow up on back pain, thinks its actually coming from the |
|           |  right hip instead of back                                        |
+-----------+-------------------------------------------------------------------+
 
 
 
 Encounter Details
 
 
 
+--------+---------+----------------------+----------------------+----------------------+
| Date   | Type    | Department           | Care Team            | Description          |
+--------+---------+----------------------+----------------------+----------------------+
| / | Office  |   [facility]   |   Braxton Trujillo | Right-sided low back |
| 2016   | Visit   | CARE  [address] |  MD ROGER  [address]   |  pain with           |
|        |         |   [address]    | [address]  [place] | right-sided          |
|        |         | [address]           |  [address]  [phone] | sciatica,            |
|        |         | [phone]         |   [phone] (Fax) | unspecified          |
|        |         |                      |                      | chronicity (Primary  |
|        |         |                      |                      | Dx); IT band         |
|        |         |                      |                      | syndrome, right      |
+--------+---------+----------------------+----------------------+----------------------+
 
 
 
 Social History
 
 
+--------------------+-------+-----------+--------+------+
| Tobacco Use        | Types | Packs/Day | Years  | Date |
|                    |       |           | Used   |      |
+--------------------+-------+-----------+--------+------+
| Current Every Day  |       | 1         |        |      |
| Smoker             |       |           |        |      |
+--------------------+-------+-----------+--------+------+
 
 
 
+---------------------+---+---+---+
| Smokeless Tobacco:  |   |   |   |
| Never Used          |   |   |   |
+---------------------+---+---+---+
 
 
 
+-------------+-------------+---------+----------+
| Alcohol Use | Drinks/Week | oz/Week | Comments |
+-------------+-------------+---------+----------+
| Not Asked   |             |         |          |
+-------------+-------------+---------+----------+
 
 
 
+------------------+---------------+
| Sex Assigned at  | Date Recorded |
| Birth            |               |
+------------------+---------------+
| Not on file      |               |
+------------------+---------------+
 documented as of this encounter
 
 Last Filed Vital Signs
 
 
+-------------------+-------------------+----------------------+----------+
| Vital Sign        | Reading           | Time Taken           | Comments |
+-------------------+-------------------+----------------------+----------+
| Blood Pressure    | 144/77            | 2016  9:26 AM  |          |
|                   |                   | PDT                  |          |
 
+-------------------+-------------------+----------------------+----------+
| Pulse             | 106               | 2016  9:26 AM  |          |
|                   |                   | PDT                  |          |
+-------------------+-------------------+----------------------+----------+
| Temperature       | 37   C (98.6   F) | 2016  9:26 AM  |          |
|                   |                   | PDT                  |          |
+-------------------+-------------------+----------------------+----------+
| Respiratory Rate  | 16                | 2016  9:26 AM  |          |
|                   |                   | PDT                  |          |
+-------------------+-------------------+----------------------+----------+
| Oxygen Saturation | 96%               | 2016  9:26 AM  |          |
|                   |                   | PDT                  |          |
+-------------------+-------------------+----------------------+----------+
| Inhaled Oxygen    | -                 | -                    |          |
| Concentration     |                   |                      |          |
+-------------------+-------------------+----------------------+----------+
| Weight            | 101.6 kg (224 lb) | 2016  9:26 AM  |          |
|                   |                   | PDT                  |          |
+-------------------+-------------------+----------------------+----------+
| Height            | 162.6 cm (5' 4")  | 2016  9:26 AM  |          |
|                   |                   | PDT                  |          |
+-------------------+-------------------+----------------------+----------+
| Body Mass Index   | 38.45             | 2016  9:26 AM  |          |
|                   |                   | PDT                  |          |
+-------------------+-------------------+----------------------+----------+
 documented in this encounter
 
 Patient Instructions
 Patient Instructions Braxton Trujillo MD - 2016  9:57 AM PDTFormatting of this n
ote might be different from the original.
 Stop ibuprofen and switch to meloxicam 15 mg with food once daily for the next 7 days, then
 as needed.
 Continue Robaxin and tramadol as needed as before.
 Apply ice to the painful area for 15-20 min. At a time during the first 24-48 hours after i
njury.
 Apply moist heat for no longer than 15 min. to tender area after 48 hours since injury. Do 
not sleep on heating pad to prevent burns.  If heat makes her symptoms worse, stop.
 Rest on your back on a firm surface in the Z-position to relax your back.
 Limit yourself to light activity, with slow stretching and range of motion for your back as
 needed.  Follow recommendations provided on the included pamphlet.
 Drink lots of fluids to stay well hydrated.
 Take muscle relaxer as prescribed as needed for muscle tightness.  
 Physical therapy has been ordered for low back stretching and strengthening exercises and s
tretches for IT band syndrome.
 Return to clinic if symptoms persist over the next couple weeks, sooner if symptoms change 
or worsen.
 Report to the emergency room if persistent numbness or tingling, incontinence of bowel and/
or bladder or persistent leg weakness develops. 
 
 Back Pain (Acute Or Chronic)
 
 Back pain is one of the most common problems The good news is that most people feel better 
in 1 to 2 weeks, and most of the rest in 1 to 2 months. Most people can remain active.
 Symptoms
 People experience and describe pain differently; not everyone is the same.
  The pain can be sharp, stabbing, shooting, aching, cramping or burning.
  Movement, standing, bending, lifting, sitting, or walking may worsen pain.
  It can be localized to one spot or area, or it can be more generalized.
  It can spread or radiate upwards, to the front, or go down your arms or legs (sciatica).
  It can cause muscle spasm.
 
 Causes
 Most of the time, "mechanical problems" with the musclesor spine cause the pain. 
al problemsare usually caused by an injury to the muscles or ligaments. While illness can 
cause back pain, it is usually not caused by a serious illness.
  Physical activity such as sports, exercise, work, or normal activity
  Overexertion, lifting, pushing, pulling incorrectly or too aggressively
  Sudden twisting, bending, or stretching from an accident, or accidental movement
  Poor posture
  Stretching or moving wrong, without noticing pain at the time
  Poor coordination, lack of regular exercise (check with your doctor about this)
  Spinal disc disease or arthritis
  Stress
 Pain can also be related to pregnancy, or illness like appendicitis, bladder or kidney infe
ctions, pelvic infections, and many other things.
 
 Acute back pain usually gets better in1 to 2 weeks. Back pain related to disk disease, ar
thritis in the spinal joints or spinal stenosis (narrowing of the spinal canal) can become c
hronic and last for months or years.
 Unless you had a physical injury (for example, a car accident or fall) X-rays are usually n
ot needed for the initial evaluation of back pain. If pain continues and does not respond to
 medical treatment, X-rays and other tests may bedone at a later time.
 Home care
 Try these home care recommendations:
  When in bed, tryto find a position of comfort. A firm mattress is best. Try lying flat
 on your back with pillows under your knees. You can also try lying on your side with your k
nees bent up towards your chest and a pillow between your knees.
  At first, do not try to stretch out the sore spots. If there is a strain, it is not like
 the good soreness you get after exercising without an injury. In this case, stretching may 
make it worse.
  Avoid prolong sitting, long car rides, or travel. This puts more stress on the lower bac
k than standing or walking.
  During the first 24 to 72 hours after an acute injury or flare up of chronic back pain, 
apply an ice pack to the painful area for 20 minutes and then remove it for 20 minutes over 
a period of 60 to 90 minutes or several times a day. This will reduce swelling and pain. Wra
p the ice pack in a think towel or plastic to protect your skin.
  You can start with ice, then switch to heat. Heat (hot shower, hot bath, or heating pad)
 reduces swelling and pain and works well for muscle spasms. Heat can be applied to the pain
ful area for 20 minutes then remove it for 20 minutes over a period of 60 to 90 minutes or s
everal times a day. Do not sleep on a heating pad. It can lead to skin burns or tissue damag
e.
  You can alternate ice and heat therapy. Talk with your doctor aboutthe best treatment 
for your back pain.
  Therapeutic massage can help relax the back muscles without stretching them.
  Be aware of safe lifting methods and do not lift anything without stretching first.
 Medicines
 Talk to your doctor before using medications, especially if you have other medical problems
 or are taking other medicines.
  You may use acetaminophen or ibuprofen to control pain, unless another pain medicine was
 prescribed. If you have chronic conditions like diabetes, liver or kidney disease, stomach 
ulcers, or gastrointestinal bleeding, or are taking blood thinners talk to your doctor befor
e taking any medication.
  Be careful if you are given a prescription medicines, narcotics, or medication for muscl
e spasms. They can cause drowsiness, affect your coordination, reflexes, and judgement. Do n
ot drive or operate heavy machinery.
 Follow-up care
 Follow up with your doctor or this facility if your symptoms do not start to improve after 
one week. Physical therapy may be needed.
 If X-rays were taken, they will be reviewed by a radiologist. You will be notified of any n
ew findings that may affect your care.
 Call 911
 
 Call emergency services if any of the following occur:
  Trouble breathing
  Confusion
  Very drowsy or trouble awakening
  Fainting or loss of consciousness
  Rapid or very slow heart rate
  Loss of bowel or bladder control
 When to seek medical care
 Get prompt medical attention if any of the following occur:
  Pain becomes worse or spreads to your legs
  Weakness or numbness in one or both legs
  Numbness in the groin or genital area
  4769-8657 [email or web address]. [address] [place] [address]. All righ
ts reserved. This information is not intended as a substitute for professional medical care.
 Always follow your healthcare professional's instructions.
 
 Treatment for Iliotibial Band Syndrome
 Iliotibial band syndrome, or IT band syndrome, is a condition that causes pain on the outsi
de of the knee. It most often occurs in athletes, especially long-distance runners. It can h
appen if you cycle, ski, row, or play soccer. It can also occur in people who are starting t
o exercise.
 Types of treatment
 Treatment may include:
  Avoiding any activity that makes your knee pain worse for a while (like running), and re
turning to this activity slowly over time
  Icing the outside of your knee when it causes you pain
  Taking over-the-counter pain medicines
  Having corticosteroid injections, to reduce inflammation
  Making changes to your activity, like lowering your bicycle seat for cycling or improvin
g your running form
  Practicing special exercises to stretch and strengthen the muscles around your hip and y
our knee
 You may find it helpful to work with a physical therapist.
 These treatments help most people with IT band syndrome. Your doctor may advise surgery if 
you still have severe symptoms after 6 months of other treatment. Your doctor will talk with
 you about the types of surgery.
 Preventing IT band syndrome
 You may be able to prevent IT band syndrome if you:
  Run on even surfaces
  Replace your running shoes often
  Ease up on your training
  On a track, make sure you run in both directions
  Have an expert check your stance for running and other sporting activities
  Stretch your outer thigh and hamstrings often
 If you are new to exercise, start slowly. Increase your activity over time.
 Talk with your doctor or  for more advice.
 When to call the healthcare provider
 Call your healthcare provider right away if you have any of these:
  Symptoms that get worse, or don  t get better with treatment
  New symptoms 
  8536-7530 [email or web address]. [address] [place], [place] [id number]. All righ
ts reserved. This information is not intended as a substitute for professional medical care.
 Always follow your healthcare professional's instructions.
 
 Electronically signed by Braxton Trujillo MD at 2016  9:59 AM PDT
 documented in this encounter
 
 Progress Notes
 Braxton Trujillo MD - 2016 10:25 AM PDTFormatting of this note might be differen
t from the original.
 
 Subjective: 
  
 Chief Complaint: Follow-up
  
 
 Marie is a 40 y.o. female who comes in complaining of persistent right low back pain radiat
ing down outside of right leg.  It began without provocation 2 weeks ago.  She was seen here
 on  and April 15.  Those notes were reviewed.  Pain is primarily in the right low b
ack with radiation down the outside of her right leg to pass the knee.  Reports mild numbnes
s and paresthesias radiating from the outside of the right thigh to just below the knee and 
occasionally the foot.  Intermittent weakness in right leg associated with pain.  No saddle-
type anesthesia or incontinence of B+B.  No dysuria or urinary frequency.  No fever, chills 
or night sweats.  No other complaints. Marie has history of similar pain several years ago. 
 Has prior back imaging studies, these were reviewed.  No hx of back surgery.
 
 Patient's medications, allergies, past medical, surgical, social and family histories were 
reviewed and updated as appropriate.
 
  
 Objective: 
 
 /77 mmHg | Pulse 106 | Temp(Src) 37 C (98.6 F) (Temporal) | Resp 16 | Ht 1.626 m 
(5' 4") | Wt 101.606 kg (224 lb) | BMI 38.43 kg/m2 | SpO2 96%
 
 General Appearance:  Alert, cooperative, no distress, appears stated age.  Unremarkable gai
t. Approaches exam table without assistance.
 Head:  Normocephalic, atraumatic.
 Chest: Clear to auscultation.
 Back:  No erythema, rash or ecchymosis.  No palpable spasm, no midline tenderness, right, l
ower lumbar paraspinous tenderness to palpation and range of motion, no focal spinal tendern
ess to percussion.  No CVA tenderness.  No pain reproduced with axial compression on the spi
ne.  Negative straight leg raise.
 Musculoskeletal: Point tenderness at the right greater trochanter extending down the IT ban
d to the lateral knee.  This reproduces her hip pain. Passive adduction of the right thigh o
ff the side of the table reproduces her thigh pain.
 Neuro: Lower extremity DTRs with symmetric patellar and achilles reflexes, toes down going.
 Grossly normal strength throughout both legs.  Subjective hypoesthesia along the right late
ral thigh to just below the knee.  Heel and toe walking and squat without apparent weakness.
  Skin: No rash.
 
 Lumbar spine x-rays, lab evaluation and ultrasound from last week were reviewed.
 
 Assessment and Plans: 
 
 Assessment:  
 1. Right-sided low back pain with right-sided sciatica, unspecified chronicity  meloxicam (
MOBIC) 15 mg tablet 
  * [facility] - AMB Referral 
 2. IT band syndrome, right  * [facility] - [facility]B Referral 
 
 We discussed the usual lack of pinpointing precise diagnosis causing low back pain and scia
tic issues.  Given the lack of objective neurologic findings, advanced imaging is not curren
tly indicated.  She was given work restriction and referral to physical therapy to address t
hese 2 issues.  She agrees with the plan and her questions were answered to her satisfaction
.
 
 Plan:  
 Stop ibuprofen and switch to meloxicam 15 mg with food once daily for the next 7 days, then
 as needed.
 Continue Robaxin and tramadol as needed as before.
 
 Apply ice to the painful area for 15-20 min. At a time during the first 24-48 hours after i
njury.
 Apply moist heat for no longer than 15 min. to tender area after 48 hours since injury. Do 
not sleep on heating pad to prevent burns.  If heat makes her symptoms worse, stop.
 Rest on your back on a firm surface in the Z-position to relax your back.
 Limit yourself to light activity, with slow stretching and range of motion for your back as
 needed.  Follow recommendations provided on the included pamphlet.
 Drink lots of fluids to stay well hydrated.
 Take muscle relaxer as prescribed as needed for muscle tightness.  
 Physical therapy has been ordered for low back stretching and strengthening exercises and s
tretches for IT band syndrome.
 Return to clinic if symptoms persist over the next couple weeks, sooner if symptoms change 
or worsen.
 Report to the emergency room if persistent numbness or tingling, incontinence of bowel and/
or bladder or persistent leg weakness develops. 
 
 Braxton Monte
 Electronically signed by Braxton Trujillo MD at 2016 10:34 AM PDTdocumented in th
is encounter
 
 Plan of Treatment
 
 
+-----------------+-------------+--------+----------------------+---------------------+
| Name            | Type        | Priori | Associated Diagnoses | Order Schedule      |
|                 |             | ty     |                      |                     |
+-----------------+-------------+--------+----------------------+---------------------+
| * [facility]  | Outpatient  | Routin |   Right-sided low    | Ordered: 2016 |
| Therapy - AMB   | Referral    | e      | back pain with       |                     |
| Referral        |             |        | right-sided          |                     |
|                 |             |        | sciatica,            |                     |
|                 |             |        | unspecified          |                     |
|                 |             |        | chronicity  IT band  |                     |
|                 |             |        | syndrome, right      |                     |
+-----------------+-------------+--------+----------------------+---------------------+
 documented as of this encounter
 
 Visit Diagnoses
 
 
+-----------------------------------------------------------------------------------------+
| Diagnosis                                                                               |
+-----------------------------------------------------------------------------------------+
|   Right-sided low back pain with right-sided sciatica, unspecified chronicity - Primary |
+-----------------------------------------------------------------------------------------+
|   It band syndrome, right                                                               |
+-----------------------------------------------------------------------------------------+
 documented in this encounter

## 2020-09-28 NOTE — XMS
Encounter Summary
  Created on: 2020
 
 Marie Perez
 External Reference #: [id number]
 : [birth year]
 Sex: Female
 
 Demographics
 
 
+-----------------------+------------------------+
| Address               | [address]          |
|                       | [place]  [address]      |
+-----------------------+------------------------+
| Home Phone            | [phone]        |
+-----------------------+------------------------+
| Preferred Language    | Unknown                |
+-----------------------+------------------------+
| Marital Status        |                 |
+-----------------------+------------------------+
| [employer] Affiliation | Unknown                |
+-----------------------+------------------------+
| Race                  | White                  |
+-----------------------+------------------------+
| Ethnic Group          | Not  or  |
+-----------------------+------------------------+
 
 
 Author
 
 
+--------------+--------------------------------------------+
| Author       | [facility]  |
|              | and [place]                                |
+--------------+--------------------------------------------+
| Organization | [facility]  |
|              | and [place]                                |
+--------------+--------------------------------------------+
| Address      | Unknown                                    |
+--------------+--------------------------------------------+
| Phone        | Unavailable                                |
+--------------+--------------------------------------------+
 
 
 
 Support
 
 
+-----------------+--------------+---------+-----------------+
| Name            | Relationship | Address | Phone           |
+-----------------+--------------+---------+-----------------+
| Rodríguez Perez | ECON         | Unknown | [phone] |
+-----------------+--------------+---------+-----------------+
 
 
 
 Care Team Providers
 
 
 
+-----------------------+------+-------------+
| Care Team Member Name | Role | Phone       |
+-----------------------+------+-------------+
| No, Physician         | PCP  | Unavailable |
+-----------------------+------+-------------+
 
 
 
 Encounter Details
 
 
+--------+-----------+----------------------+----------------------+----------------------+
| Date   | Type      | Department           | Care Team            | Description          |
+--------+-----------+----------------------+----------------------+----------------------+
| / | [facility]  [facility]   [facility]     |   Kinza Santana   | Preop examination;   |
| 2016 - | Encounter | [facility]  [address]  | Submucous leiomyoma  |
|        |           |  [address]          | [address]          | of uterus            |
| / |           | [place], [place]         | [place] [address]   |                      |
| 2016   |           | [address]           | [phone]         |                      |
|        |           | [phone]         | [phone] (Fax)   |                      |
+--------+-----------+----------------------+----------------------+----------------------+
 
 
 
 Social History
 
 
+--------------------+-------+-----------+--------+------+
| Tobacco Use        | Types | Packs/Day | Years  | Date |
|                    |       |           | Used   |      |
+--------------------+-------+-----------+--------+------+
| Current Every Day  |       | 1         |        |      |
| Smoker             |       |           |        |      |
+--------------------+-------+-----------+--------+------+
 
 
 
+---------------------+---+---+---+
| Smokeless Tobacco:  |   |   |   |
| Never Used          |   |   |   |
+---------------------+---+---+---+
 
 
 
+-------------+-------------+---------+----------+
| Alcohol Use | Drinks/Week | oz/Week | Comments |
+-------------+-------------+---------+----------+
| Not Asked   |             |         |          |
+-------------+-------------+---------+----------+
 
 
 
+------------------+---------------+
| Sex Assigned at  | Date Recorded |
| Birth            |               |
+------------------+---------------+
| Not on file      |               |
+------------------+---------------+
 
 documented as of this encounter
 
 Last Filed Vital Signs
 
 
+-------------------+---------------------+----------------------+----------+
| Vital Sign        | Reading             | Time Taken           | Comments |
+-------------------+---------------------+----------------------+----------+
| Blood Pressure    | 105/54              | 2016  8:04 AM  |          |
|                   |                     | PDT                  |          |
+-------------------+---------------------+----------------------+----------+
| Pulse             | 89                  | 2016  8:04 AM  |          |
|                   |                     | PDT                  |          |
+-------------------+---------------------+----------------------+----------+
| Temperature       | 36.9   C (98.4   F) | 2016  8:04 AM  |          |
|                   |                     | PDT                  |          |
+-------------------+---------------------+----------------------+----------+
| Respiratory Rate  | 18                  | 2016  8:04 AM  |          |
|                   |                     | PDT                  |          |
+-------------------+---------------------+----------------------+----------+
| Oxygen Saturation | -                   | -                    |          |
+-------------------+---------------------+----------------------+----------+
| Inhaled Oxygen    | -                   | -                    |          |
| Concentration     |                     |                      |          |
+-------------------+---------------------+----------------------+----------+
| Weight            | 102.9 kg (226 lb    | 2016  8:04 AM  |          |
|                   | 13.6 oz)            | PDT                  |          |
+-------------------+---------------------+----------------------+----------+
| Height            | 165.1 cm (5' 5")    | 2016  8:04 AM  |          |
|                   |                     | PDT                  |          |
+-------------------+---------------------+----------------------+----------+
| Body Mass Index   | 37.75               | 2016  8:04 AM  |          |
|                   |                     | PDT                  |          |
+-------------------+---------------------+----------------------+----------+
 documented in this encounter
 
 Discharge Summaries
 Kinza Santana - 2016  8:34 AM PDTFormatting of this note might be different from
 the original.
 Discharge Summaries by Kinza Santana MD at 16  
  Author:  Kinza Santana MD Service:  Obstetrics/Gynecology Author Type:  Physician 
  Filed:  16 1858 Date of Service:  16 Status:  Signed 
  :  Kinza Santana MD (Physician)   
   
 [facility]  
 Service:  Obstetrics & Gynecology
 Discharge Summary
 
 Date of Admission:  2016
 Date of Discharge:  2016
 
 Discharge Provider:  KINZA SANTANA MD
 Treatment Team:
  Admitting Provider: Kinza Santana MD
 
 Discharge Diagnoses:  
 
 Active Problems:
   * No active hospital problems. *
 Resolved Problems:
 
   * No resolved hospital problems. *
 
 Final Diagnoses:  Post op abd hyst, rso
 
  
 
 Procedures:  Procedure(s) with comments:
 ABDOMINAL - HYSTERECTOMY - Ovaries remain in situ, will remove fallopian tubes as well childers
rox, 
 EXCISION - LESION -  buttocks mole
 CYSTOSCOPY
 ABDOMINAL - SALPINGO-OOPHORECTOMY - right ovary taken , left ovary in place
 
 Significant Diagnostic Studies:  none
 
 BRIEF HISTORY OF PRESENTATION:  
      Marie Perez is a 41 y.o. female 
 HOSPITAL COURSE:  
      Patient ID: Marie Perez is a 41 y.o. female.
 
 HPI 
 Patient presents today for a preoperative exam for a COURTNEY with salpingectomy with removal of
 right sided buttocks lesion scheduled on 2016 due to Leiomyoma of uterus, unspecified
 Surgery was without complication and was fairly unremarkable.  Patient did very well postop
eratively and was discharged on postoperative day #1
 In order to do that she will course and met all appropriate milestones
 Operative note is included below
 Courtney, bilateral salpingectomy, right ooperectomy, omentectomy, diagnostic cystoscopy,
 ebl per anesthesia 750 ml 
 
 PREOPERATIVE DIAGNOSIS
 Abdominopelvic mass.
 
 POSTOPERATIVE DIAGNOSIS
 Abdominopelvic mass.
 
 PROCEDURES PERFORMED
 Total abdominal hysterectomy, bilateral salpingectomy, right oophorectomy, omentectomy, domenico
l washings, diagnostic cystoscopy.
 
 SURGEON
 Kinza Santana M.D.
 
 ASSISTANT
 Ana Cristina Albarran
 
 ANESTHESIA
 General endotracheal anesthesia administered.
 
 ESTIMATED BLOOD LOSS
 750 mL by anesthesia.
 
 FINDINGS
 Large uterine mass extending up to the umbilicus and very cystic in nature on the right. No
rmal ovaries bilaterally. Normal appendix. Normal liver edge. Kidneys normal to palpation. I
 did not feel any unusual lymph nodes. There was any unusual degree of ascites upon entrance
 into the abdominopelvic cavity
 Past Medical History   
 Diagnosis  Date 
   Fibroid  
 
   Back pain  
   Asthma  
   Bronchitis  
 
 Past Surgical History     
 Procedure   Laterality Date 
   Tubal ligation   2003 
   Dilation and curettage of uterus    
   age 18 following a Missed AB   
   Dilation and curettage of uterus    
   repeat D&C 2 weeks later   
   Knee surgery  Right  
   Repair   
 
 Allergies    
 Allergen   Reactions 
   Asa [Aspirin]  Other (See Comments) 
   Patient reports history of bleeding ulcer at age 5  
   Food  Swelling 
   Mangos cause swelling  
   Peanut-Containing Drug Products  Swelling 
   Walnuts causeTongue swelling   
 
 Prescriptions prior to admission      
 Medication  Sig Dispense Refill Last Dose 
   ibuprofen (ADVIL) 200 MG tablet Take 400 mg by mouth.   Past Week at Unknown time 
   docusate sodium (COLACE) 100 MG capsule One po bid for stool softening 60 capsule 0  
   ibuprofen (MOTRIN) 800 MG tablet Take 1 tablet by mouth every 8 (eight) hours as needed
 for Pain. 40 tablet 0  
   oxyCODONE (ROXICODONE) 5 MG immediate release tablet Take 1 tablet by mouth every 4 (fo
ur) hours as needed for Pain. 40 tablet 0  
 
 DISCHARGE EXAM
 Vital Signs:
 /54 mmHg | Pulse 89 | Temp(Src) 98.4 F (36.9 C) (Oral) | Resp 18 | Ht 1.651 m (5'
 5") | Wt 102.9 kg (226 lb 13.7 oz) | BMI 37.75 kg/m2 | SpO2 99% | LMP 2016
 Temp:  [97.2 F (36.2 C)-98.6 F (37 C)] 98.4 F (36.9 C) (722)
 BP: ()/(51-73) 105/54 mmHg (722)
 Heart Rate:  [] 89 (722)
 Resp:  [12-28] 18 (722)
 SpO2:  [92 %-100 %] 99 % (722)
 FiO2 :  [44 %-88 %] 88 % ( 1024)
 
 Physical Exam 
 Constitutional: She is oriented to person, place, and time. She appears well-developed and 
well-nourished. 
 Cardiovascular: Normal rate and regular rhythm.  
 Pulmonary/Chest: Effort normal and breath sounds normal. She has no wheezes. 
 Abdomina/Gl: Soft. Bowel sounds are normal. She exhibits no distension. There is no tendern
ess. 
 Incision looks great,  Clean , dry , and intact
  
 Genitourinary: 
 Per dry  
 Neurological: She is alert and oriented to person, place, and time. 
 Skin: Skin is warm and dry. 
 Psychiatric: Her behavior is normal. Thought content normal. 
 Vitals reviewed.
 
 DATA
 
 
 CBC:  
 Lab Results    
 Component  Value Date 
  WBC 15.19* 2016 
  RBC 3.62* 2016 
  HGB 11.4 2016 
  HCT 32.6* 2016 
  MCV 90.2 2016 
  MCH 31.5 2016 
  MCHC 34.9 2016 
  RDW 40.7 2016 
   2016 
  MPV 8.0 2016 
  DIFFTYPE AUTOMATED 2016 
 
 PLAN
 
 discharge
 
 Disposition:  Home
 Condition:  Good
 Code Status:  Full Code
 
 No discharge procedures on file.
 
 Follow up:
 Kinza Santana MD
 [address]
 [address]
 [facility] [address]
 [phone]
 
 Go on 2016
 post op follow up as already scheduled 
 
  
 Medication List 
  
 CONTINUE taking these medications  
    
  docusate sodium 100 MG capsule 
 QTY:  60 capsule 
 Refills:  0 
 Commonly known as:  COLACE 
 One po bid for stool softening 
  
  * ibuprofen 200 MG tablet 
 Refills:  0 
 Commonly known as:  ADVIL 
  
  * ibuprofen 800 MG tablet 
 QTY:  40 tablet 
 Refills:  0 
 Commonly known as:  MOTRIN 
 Take 1 tablet by mouth every 8 (eight) hours as needed for Pain. 
  
  oxyCODONE 5 MG immediate release tablet 
 QTY:  40 tablet 
 Refills:  0 
 
 Commonly known as:  ROXICODONE 
 Take 1 tablet by mouth every 4 (four) hours as needed for Pain. 
  
  * Notice:  This list has 2 medication(s) that are the same as other medications prescribed
 for you. Read the directions carefully, and ask your doctor or other care provider to revie
w them with you. 
  
 
 Discharge took 25 minutes, to include final examination, discussion of admission, and prepa
ration of prescriptions, instructions for on-going care, follow-up and documentation of disc
harge summary.
 
 KINZA SANTANA MD
 2016
  
  Electronically signed by Kinza Santana at 2016  6:58 PM PDTdocumented in this en
counter
 
 Medications at Time of Discharge
 
 
+----------------------+----------------------+-----------+---------+----------+-----------+
| Medication           | Sig                  | Dispensed | Refills | Start    | End Date  |
|                      |                      |           |         | Date     |           |
+----------------------+----------------------+-----------+---------+----------+-----------+
|   ibuprofen (ADVIL,  | Take 400 mg by mouth |           | 0       |          |           |
| MOTRIN) 200 mg       |  every 6 hours as    |           |         |          |           |
| tablet               | needed for Pain.     |           |         |          |           |
+----------------------+----------------------+-----------+---------+----------+-----------+
|   albuterol 2.5 mg/3 | Use one vial in your |   60 vial | 0       | 20 | 10/08/201 |
|  mL nebulizer        |  nebulizer every 4   |           |         | 15       | 6         |
| solutionIndications: | hours as needed for  |           |         |          |           |
|  Asthma, mild        | wheezing             |           |         |          |           |
| intermittent,        |                      |           |         |          |           |
| uncomplicated        |                      |           |         |          |           |
+----------------------+----------------------+-----------+---------+----------+-----------+
|   diazepam (VALIUM)  | Take 1 tablet by     |   1       | 0       | 20 | 10/08/201 |
| 10 MG tablet         | mouth as needed (1   | tablet    |         | 16       | 6         |
|                      | hour before MRI) for |           |         |          |           |
|                      |  up to 1 dose.       |           |         |          |           |
+----------------------+----------------------+-----------+---------+----------+-----------+
|   meloxicam (MOBIC)  | Take 1 tablet by     |   30      | 0       | 20 | 10/08/201 |
| 15 mg                | mouth Daily as       | tablet    |         | 16       | 6         |
| tabletIndications:   | needed for Pain.     |           |         |          |           |
| Right-sided low back |                      |           |         |          |           |
|  pain with           |                      |           |         |          |           |
| right-sided          |                      |           |         |          |           |
| sciatica,            |                      |           |         |          |           |
| unspecified          |                      |           |         |          |           |
| chronicity           |                      |           |         |          |           |
+----------------------+----------------------+-----------+---------+----------+-----------+
|   methylPREDNISolone | follow package       |   21      | 0       | 20 | 10/08/201 |
|  (MEDROL DOSEPAK) 4  | directions           | tablet    |         | 16       | 6         |
| mg tablet            |                      |           |         |          |           |
+----------------------+----------------------+-----------+---------+----------+-----------+
|                      | Take  by mouth.      |           | 0       |          | 10/08/201 |
| Zxqupkjzf-MJL-YV-APA |                      |           |         |          | 6         |
| P (DIMETAPP          |                      |           |         |          |           |
| MULTISYMPTOM         |                      |           |         |          |           |
| COLD/FLU PO)         |                      |           |         |          |           |
 
+----------------------+----------------------+-----------+---------+----------+-----------+
|   PROAIR  (90 | inhale 2 puffs by    |   8.5 g   | 3       | 20 | 10/08/201 |
|  BASE) MCG/ACT       | mouth every 4 hours  |           |         | 16       | 6         |
| inhaler              | if needed for        |           |         |          |           |
|                      | wheezing             |           |         |          |           |
+----------------------+----------------------+-----------+---------+----------+-----------+
|   traMADol (ULTRAM)  | One tablet orally    |   30      | 0       | 20 | 10/08/201 |
| 50 mg                | every 4 hours as     | tablet    |         | 16       | 6         |
| tabletIndications:   | needed for pain      |           |         |          |           |
| Low back pain,       |                      |           |         |          |           |
| unspecified back     |                      |           |         |          |           |
| pain laterality,     |                      |           |         |          |           |
| unspecified          |                      |           |         |          |           |
| chronicity, with     |                      |           |         |          |           |
| sciatica presence    |                      |           |         |          |           |
| unspecified          |                      |           |         |          |           |
+----------------------+----------------------+-----------+---------+----------+-----------+
 documented as of this encounter
 
 Progress Notes
 Conversion Transaction, Provider Unknown - 2016  9:35 AM PDTFormatting of this note m
ight be different from the original.
 Nurse Progress Note by Ginna Blackmon RN at 16 [id number]  
  Author:  Ginna Blackmon RN Service:  (none) Author Type:  Registered Nurse 
  Filed:  16 Date of Service:  16 Status:  Signed 
  :  Ginna Blackmon RN (Registered Nurse)   
   
 Discharge teaching done, instructions given. Pt states understanding. Medications discussed
, no questions or concerns. Pt instructed to contact MD with any concerns or if signs/sympto
ms re-occur that were associated with this hospitalization. 
 
 Ginna Blackmon RN
 2016
 9:35 AM
 
  
  Electronically signed by Conversion Transaction, Provider at 2019  7:08 AM PDTdocume
nted in this encounter
 
 H&P Notes
 Kinza Santana - 2016 10:00 PM PDTFormatting of this note might be different from
 the original.
 Interval H&P Note by Kinza Santana MD at 16  
  Author:  Kinza Santana MD Service:  Obstetrics/Gynecology Author Type:  Physician 
  Filed:  16 Date of Service:  16 Status:  Signed 
  :  Kinza Santana MD (Physician)   
   
 [facility]  
 Service:  Obstetrics & Gynecology
 Pre-Operative History & Physical
 Interval Update
 
  
 There have been no significant clinical changes since the completion of the above H&P.
 
 KINZA SANTANA MD
 2016
 
 *CORE MEASURES REMINDER:  If the patient has a known or suspected infection prior to surger
y, please add diagnosis to the problem list (consider:  Infection 136.9).
 
  
  Source Note  
  Author:  Kinza Santana MD Service:  (none) Author Type:  Physician 
  Filed:  16 Date of Service:  16 Status:  Signed 
  :  Kinza Santana MD (Physician) 
  
   
  
 Service:  Obstetrics & Gynecology
 Note
 
   
 Subjective: 
 
 Patient ID: Marie Perez is a 41 y.o. female.
 
 HPI 
 Patient presents today for a preoperative exam for a COURTNEY with salpingectomy with removal of
 right sided buttocks lesion scheduled on 2016 due to Leiomyoma of uterus, unspecified
 
 The following portions of the patient's history were reviewed and updated as appropriate: 
 She  has a past medical history of Fibroid.
 She has Uterine leiomyoma and Pelvic pain on her pertinent problem list.
 She  has past surgical history that includes Tubal ligation (2003); Dilation and cure
ttage of uterus; Dilation and curettage of uterus; and Knee surgery (Right).
 Her family history is not on file.
 She  reports that she has been smoking Cigarettes.  She has a 20 pack-year smoking history.
 She does not have any smokeless tobacco history on file. She reports that she does not drin
k alcohol or use illicit drugs.
 She has a current medication list which includes the following prescription(s): ibuprofen.
 Current Outpatient Prescriptions on File Prior to Visit     
 Medication  Sig Dispense Refill 
   ibuprofen (ADVIL) 200 MG tablet Take 400 mg by mouth.   
 
 No current facility-administered medications on file prior to visit. 
 
 She is allergic to asa and peanut-containing drug products..
 
 Review of Systems 
 Constitutional: Negative for fever, chills, diaphoresis, activity change, appetite change, 
fatigue and unexpected weight change. 
 Gastrointestinal: Negative for nausea, vomiting, abdominal pain, diarrhea, constipation, bl
ood in stool, abdominal distention, anal bleeding and rectal pain. 
 Genitourinary: Positive for pelvic pain and dyspareunia. Negative for dysuria, urgency, patrick
quency, hematuria, flank pain, decreased urine volume, vaginal bleeding, vaginal discharge (
due for her menstrual cycle soon), enuresis, difficulty urinating, genital sores, vaginal pa
in and menstrual problem. 
 Musculoskeletal: Positive for back pain. 
 All other systems reviewed and are negative.
 
 Stay-- In Patient
 Preforming Dept :[facility]
 Surgery : COURTNEY with salpingectomy with removal of right sided buttocks lesion
 Provider : Lavelle
 Assistant Surgeon : TALIA
 Length of the surgery : 120 minutes
 Pre op Date : 08/15/2016
 Surgery date 2016
 Post op Date 
     
 
 Objective: 
 Physical Exam 
 Constitutional: She is oriented to person, place, and time. She appears well-developed and 
well-nourished. 
 HENT: 
 Head: Normocephalic and atraumatic. 
 Cardiovascular: Normal rate and regular rhythm.  
 No murmur heard.
 2/6 murmur 
 Pulmonary/Chest: Effort normal and breath sounds normal. She has no wheezes. 
 Good air movement with course breathe sounds consistent with smoking status. 
 Abdomina/Gl: Soft. She exhibits no distension and no mass. There is no tenderness. There is
 no guarding. 
 Genitourinary: 
 Pelvic exam deferred at today's visit
 Right buttock lesion causing irritation from clothing 
 Neurological: She is alert and oriented to person, place, and time. 
 Skin: Skin is warm and dry. 
 Psychiatric: She has a normal mood and affect. Her behavior is normal. Judgment and thought
 content normal. 
 Vitals reviewed.
 
    
 Assessment and Plan: 
 
 Visit Diagnoses and Associated Orders:
 
 Preop examination
 Pre op today. She is scheduled for COURTNEY with salpingectomy with removal or right sided butto
cks lesion on 2016. 
 
 Procedure process and benefits were discussed. Nature of the procedure(s), risks to include
, but not limited to,  bleeding, transfusion risks, infection, injury to other organs or str
uctures requiring further surgery, either at the time of this procedure or a future date, fo
r repair or removal of injured structure, failure to relieve symptoms, anesthesia risks were
 all discussed and explained.  She voices understanding and wishes to proceed. Patient's que
stions answered. Consent signed. Post-surgery precautions reviewed.
 Patient consents to receive blood if needed in an emergency to save her life or organs.
 
   I, Dr. Santana, personally performed the services described in this documentation, as 
scribed by NATALIIA Johnson in my presence, and it is both accurate and complete.
 
 KINZA SANTANA MD
 2016
  
   
  
  Electronically signed by Kinza Santana at 2016 10:01 PM PDTKinza Santana - 
2016  9:58 PM PDTFormatting of this note might be different from the original.
 H&P (View-Only) by Kinza Santana MD at 16  
  Author:  Kinza Santana MD Service:  (none) Author Type:  Physician 
  Filed:  16 Date of Service:  16 Status:  Signed 
  :  Kinza Santana MD (Physician)   
   
  
 Service:  Obstetrics & Gynecology
 Note
 
   
 Subjective: 
 
 
 Patient ID: Marie Perez is a 41 y.o. female.
 
 HPI 
 Patient presents today for a preoperative exam for a COURTNEY with salpingectomy with removal of
 right sided buttocks lesion scheduled on 2016 due to Leiomyoma of uterus, unspecified
 
 The following portions of the patient's history were reviewed and updated as appropriate: 
 She  has a past medical history of Fibroid.
 She has Uterine leiomyoma and Pelvic pain on her pertinent problem list.
 She  has past surgical history that includes Tubal ligation (2003); Dilation and cure
ttage of uterus; Dilation and curettage of uterus; and Knee surgery (Right).
 Her family history is not on file.
 She  reports that she has been smoking Cigarettes.  She has a 20 pack-year smoking history.
 She does not have any smokeless tobacco history on file. She reports that she does not drin
k alcohol or use illicit drugs.
 She has a current medication list which includes the following prescription(s): ibuprofen.
 Current Outpatient Prescriptions on File Prior to Visit     
 Medication  Sig Dispense Refill 
   ibuprofen (ADVIL) 200 MG tablet Take 400 mg by mouth.   
 
 No current facility-administered medications on file prior to visit. 
 
 She is allergic to asa and peanut-containing drug products..
 
 Review of Systems 
 Constitutional: Negative for fever, chills, diaphoresis, activity change, appetite change, 
fatigue and unexpected weight change. 
 Gastrointestinal: Negative for nausea, vomiting, abdominal pain, diarrhea, constipation, bl
ood in stool, abdominal distention, anal bleeding and rectal pain. 
 Genitourinary: Positive for pelvic pain and dyspareunia. Negative for dysuria, urgency, patrick
quency, hematuria, flank pain, decreased urine volume, vaginal bleeding, vaginal discharge (
due for her menstrual cycle soon), enuresis, difficulty urinating, genital sores, vaginal pa
in and menstrual problem. 
 Musculoskeletal: Positive for back pain. 
 All other systems reviewed and are negative.
 
 Stay-- In Patient
 Preforming Dept :[facility]
 Surgery : COURTNEY with salpingectomy with removal of right sided buttocks lesion
 Provider : Lavelle
 Assistant Surgeon : TALIA
 Length of the surgery : 120 minutes
 Pre op Date : 08/15/2016
 Surgery date 2016
 Post op Date 
     
 Objective: 
 Physical Exam 
 Constitutional: She is oriented to person, place, and time. She appears well-developed and 
well-nourished. 
 HENT: 
 Head: Normocephalic and atraumatic. 
 Cardiovascular: Normal rate and regular rhythm.  
 No murmur heard.
 2/6 murmur 
 Pulmonary/Chest: Effort normal and breath sounds normal. She has no wheezes. 
 Good air movement with course breathe sounds consistent with smoking status. 
 Abdomina/Gl: Soft. She exhibits no distension and no mass. There is no tenderness. There is
 no guarding. 
 
 Genitourinary: 
 Pelvic exam deferred at today's visit
 Right buttock lesion causing irritation from clothing 
 Neurological: She is alert and oriented to person, place, and time. 
 Skin: Skin is warm and dry. 
 Psychiatric: She has a normal mood and affect. Her behavior is normal. Judgment and thought
 content normal. 
 Vitals reviewed.
 
    
 Assessment and Plan: 
 
 Visit Diagnoses and Associated Orders:
 
 Preop examination
 Pre op today. She is scheduled for COURTNEY with salpingectomy with removal or right sided butto
cks lesion on 2016. 
 
 Procedure process and benefits were discussed. Nature of the procedure(s), risks to include
, but not limited to,  bleeding, transfusion risks, infection, injury to other organs or str
uctures requiring further surgery, either at the time of this procedure or a future date, fo
r repair or removal of injured structure, failure to relieve symptoms, anesthesia risks were
 all discussed and explained.  She voices understanding and wishes to proceed. Patient's que
stions answered. Consent signed. Post-surgery precautions reviewed.
 Patient consents to receive blood if needed in an emergency to save her life or organs.
 
   I, Dr. Santana, personally performed the services described in this documentation, as 
scribed by NATALIIA Johnson in my presence, and it is both accurate and complete.
 
 KINZA SANTANA MD
 2016
  
  Electronically signed by Kinza Santana at 2016  9:59 PM PDTdocumented in this en
counter
 
 Miscellaneous Notes
 Op Note - Kinza Santana - 2016 10:42 AM PDTFormatting of this note might be diff
erent from the original.
 Op Note by Kinza Santana MD at 16 1042  
  Author:  Kinza Santana MD Service:  Obstetrics/Gynecology Author Type:  Physician 
  Filed:  16 1321 Date of Service:  16 1042 Status:  Addendum 
  :  Kinza Santana MD (Physician)   
  Related Notes:  Original Note by Kinza Santana MD (Physician) filed at 16 1100   
   
 Courtney, bilateral salpingectomy, right ooperectomy, omentectomy, diagnostic cystoscopy,
 ebl per anesthesia 750 ml 
 
 PREOPERATIVE DIAGNOSIS
 Abdominopelvic mass.
 
 POSTOPERATIVE DIAGNOSIS
 Abdominopelvic mass.
 
 PROCEDURES PERFORMED
 Total abdominal hysterectomy, bilateral salpingectomy, right oophorectomy, omentectomy, domenico
l washings, diagnostic cystoscopy.
 
 SURGEON
 Kinza Santana M.D.
 
 
 ASSISTANT
 Ana Cristina Albarran
 
 ANESTHESIA
 General endotracheal anesthesia administered.
 
 ESTIMATED BLOOD LOSS
 750 mL by anesthesia.
 
 FINDINGS
 Large uterine mass extending up to the umbilicus and very cystic in nature on the right. No
rmal ovaries bilaterally. Normal appendix. Normal liver edge. Kidneys normal to palpation. I
 did not feel any unusual lymph nodes. There was any unusual degree of ascites upon entrance
 into the abdominopelvic cavity.
 
 DESCRIPTION OF PROCEDURE
 The patient was taken to the operating room and identified.  Anesthesia was administered. S
he was prepped and draped in the usual manner. Time-out was held. Appropriate consents were 
verified and confirmed.
  
 A Varma catheter was inserted. Attention was turned towards the abdomen. A Pfannenstiel ski
n incision was made. The fascia identified and divided cephalad and laterally dissecting out
 the underlying rectus. The midline was  and the peritoneal cavity entered. Irrigat
ion was carried out and fluid was collected for cytology. The abdominopelvic cavity was expl
ored. The uterus was very large but appeared to be free and mobile. A self-retaining Drew 
O retractor was placed. Inspection of the operative area showed very limited visibility in c
ertain areas but we were able to pack the bowel out of the way and start working first on th
e patient's right side where there were cystic extensions into towards the sidewall. I opene
d up the peritoneum.
 
 After dividing the round ligament, I dissected this up the infundibular pelvic ligament. I 
was able to pull the cystic structures more medial and then came across distal to the ovary 
with the LigaSure Impact and subsequently was able to incorporate this tissue into pedicles 
and divide it carrying it back towards the round ligament. Great care was taken to make sure
 it was not due to underlying great vessels or other sidewall structures. We were certainly 
anterior and medial enough that the ureters should not be in play. Once a little more operat
yulissa area was exposed, I was able to divide the posterior leaf of the broad ligament and the 
anterior leaf and incorporate the parametrial tissue and the pedicles down to a level and in
corporate end including the uterine vessels. A bladder flap was created. It was very challen
ging because of the size of the mass and the depth of the pelvis but we were able to get danyelle
t off the lower segment. One final bite of the right uterine vessels with the LigaSure and I
 went ahead and left those intact at this point.
 
 Attention was turned toward the left side. A little bit better exposure was obtained niko garg at the uterine ovarian pedicle and working my way down towards the round ligament and I w
as then able to identify the uterine vessels between the anterior and posterior leaves of th
e broad ligament which were divided and opened. Once these vessels were cauterized down to t
he level of the internal os, I opted to amputate the uterine mass. This was done with the Bonifacio
vie and the specimen was removed. It ended up weighing about 1200 g. We were then able to fu
rther dissect down to the vaginocervical junction. Lorne clamps were used parallel to the c
ervical stump and then across the vagina and the final portion of the uterus was amputated. 
Interrupted stitches were utilized to close the cuff incorporating the uterosacral ligament 
for lateral support and hemostasis was subsequently obtained.
 
 The left fallopian tube was subsequently removed and added to the previous specimen. The ri
ght ovary was very tenuous in its anatomic support and with further inspection I believe it 
was overly compromised with the initial portion of the surgery and had to be sacrificed. The
 left ovary remained with excellent blood flow and appeared to be viable. So, at this point 
irrigation was carried out. Counts were noted to be correct and the pelvis was hemostatic. T
he abdominopelvic cavity had been inspected. Those findings are as above. The retractor was 
 
removed.
 
 Peritoneum and muscle layer closed with 2-0 Vicryl, fascia with 0 PDS x2 in running stitche
s around the midline and subcutaneous tissue reapproximated with 2-0 plain gut and then reap
proximated with 4-0 Monocryl. I turned attention towards the pelvis. I typically do not perf
orm cystoscopy for a abdominal hysterectomy. However, in this case because of the distorted 
anatomy and concern over potential ureteral injury due to inability to identify it during th
e case, but surgically being rather confident it was out of the way, I thought it was best t
o inspect and insure the integrity. 0.5 mg or 50 mg of fluorescein had been administered IV 
and at that time cystoscopy was noted to be effluxing well with strong ureteral jets bilater
ally.
 
 The bladder mucosa was without lesion otherwise or evidence of surgical injury. That portio
n of the case came to a close and we turned towards the final portion which was excision of 
her buttocks lesion. That area had already been prepped. This was a 2.5 cm verrucous-appeari
ng dark pigmented lesion most likely benign in nature but was problematic for the patient be
cause of its location and interference with clothing and hygiene.
 
 The lesion itself was infiltrated in the subcutaneous tissue with 0.5% Marcaine with epinep
hrine. I sharply excised it in an elliptical manner and closed the deeper tissue with 4-0 Vi
cryl and the skin with 8 stitches of 4-0 nylon. A dressing was applied and that concluded 
e surgery. The Varma catheter was reinserted. The patient was then brought up from anesthesi
a, transferred to a gurney and taken to the recovery room in good condition.
  
 There was an area in the operative note that I should have also mentioned and that was upon
 exploration there were no unusual findings other than her omentum was thick and evelin. I di
d not feel any specific lymph nodes. However, because of the odd appearance of the pelvic tu
mor I felt that an omentectomy was indicated, so a partial omentectomy was performed using t
he LigaSure and I did this at a demarcation between the upper portion of the omentum and low
er portion where it certainly made a difference in change from being normal thin type omentu
m to a more evelin thickened and indurated area of adiposity, and took a band clear across 
e entire omentum off measuring approximately 5 inches in depth.
  
 
  
  Electronically signed by Kinza Santana at 2016  1:21 PM PDTdocumented in this en
counter
 
 Plan of Treatment
 Not on filedocumented as of this encounter
 
 Procedures
 
 
+---------------------+--------+-------------+----------------------+----------------------+
| Procedure Name      | Priori | Date/Time   | Associated Diagnosis | Comments             |
|                     | ty     |             |                      |                      |
+---------------------+--------+-------------+----------------------+----------------------+
| EXTERNAL LAB: CBC   | Routin | 2016  |                      |   Results for this   |
|                     | e      |  4:25 AM    |                      | procedure are in the |
|                     |        | PDT         |                      |  results section.    |
+---------------------+--------+-------------+----------------------+----------------------+
| TISSUE REQUEST FOR  | Routin | 2016  |                      |   Results for this   |
| PATHOLOGY (NON-ORD) | e      | 12:00 AM    |                      | procedure are in the |
|                     |        | PDT         |                      |  results section.    |
+---------------------+--------+-------------+----------------------+----------------------+
| HCG, URINE, QUAL    | Routin | 2016  |                      |   Results for this   |
|                     | e      |  7:45 AM    |                      | procedure are in the |
|                     |        | PDT         |                      |  results section.    |
+---------------------+--------+-------------+----------------------+----------------------+
 
| TYPE AND SCREEN     | Routin | 2016  |                      |   Results for this   |
|                     | e      |  7:45 AM    |                      | procedure are in the |
|                     |        | PDT         |                      |  results section.    |
+---------------------+--------+-------------+----------------------+----------------------+
| MEDICAL CYTOLOGY    | Routin | 2016  |                      |   Results for this   |
|                     | e      | 12:00 AM    |                      | procedure are in the |
|                     |        | PDT         |                      |  results section.    |
+---------------------+--------+-------------+----------------------+----------------------+
 documented in this encounter
 
 Results
 External Lab: [facility] (2016  4:25 AM PDT)
 
+-------------+--------------------------+-----------------+------------+--------------+
| Component   | Value                    | Ref Range       | Performed  | Pathologist  |
|             |                          |                 | At         | Signature    |
+-------------+--------------------------+-----------------+------------+--------------+
| WBC         | 15.19 (H)Comment:        | 3.80 - 11.00    | EXTERNAL   |              |
|             | Testing performed at     | K/uL            | LAB        |              |
|             | [facility], [address]   |                 |            |              |
|             | [place]      |                 |            |              |
|             | [id number]                    |                 |            |              |
+-------------+--------------------------+-----------------+------------+--------------+
| Non-Fetal   | 3.62 (L)Comment: Testing | 3.70 - 5.10     | EXTERNAL   |              |
| Red Blood   |  performed at [facility], [address]  | M/uL            | LAB        |              |
| Cells       | [address],       |                 |            |              |
| Counted     | [place]  [id number]     |                 |            |              |
+-------------+--------------------------+-----------------+------------+--------------+
| Hemoglobin  | 11.4Comment: Testing     | 11.3 - 15.5     | EXTERNAL   |              |
|             | performed at [facility], [address] | g/dL            | LAB        |              |
|             |  [address],        |                 |            |              |
|             | [place]  [id number]     |                 |            |              |
+-------------+--------------------------+-----------------+------------+--------------+
| Hematocrit, | 32.6 (L)Comment: Testing | 34.0 - 46.0 %   | EXTERNAL   |              |
|  POC        |  performed at [facility], [address]  |                 | LAB        |              |
|             | [address],       |                 |            |              |
|             | [place]  [id number]     |                 |            |              |
+-------------+--------------------------+-----------------+------------+--------------+
| MCV         | 90.2Comment: Testing     | 80.0 - 100.0 fl | EXTERNAL   |              |
|             | performed at [facility], [address] |                 | LAB        |              |
|             |  [address],        |                 |            |              |
|             | [place]  [id number]     |                 |            |              |
+-------------+--------------------------+-----------------+------------+--------------+
| MCH         | 31.5Comment: Testing     | 27.0 - 34.0 pg  | EXTERNAL   |              |
|             | performed at [facility], [address] |                 | LAB        |              |
|             |  [address],        |                 |            |              |
|             | [place]  [id number]     |                 |            |              |
+-------------+--------------------------+-----------------+------------+--------------+
| MCHC        | 34.9Comment: Testing     | 32.0 - 35.5     | EXTERNAL   |              |
|             | performed at [facility], [address] | g/dL            | LAB        |              |
|             |  [address],        |                 |            |              |
|             | [place]  [id number]     |                 |            |              |
+-------------+--------------------------+-----------------+------------+--------------+
| RDW-CV      | 40.7Comment: Testing     | 37 - 53 fl      | EXTERNAL   |              |
|             | performed at [facility], [address] |                 | LAB        |              |
|             |  [address],        |                 |            |              |
|             | [place]  [id number]     |                 |            |              |
+-------------+--------------------------+-----------------+------------+--------------+
| Platelet    | 267Comment: Testing      | 150 - 400 K/uL  | EXTERNAL   |              |
| Count       | performed at [facility], [address] |                 | LAB        |              |
 
| Plasma      |  [address],        |                 |            |              |
|             | [place]  [id number]     |                 |            |              |
+-------------+--------------------------+-----------------+------------+--------------+
| MPV         | 8.0Comment: Testing      | fl              | EXTERNAL   |              |
|             | performed at [facility], [address] |                 | LAB        |              |
|             |  [address],        |                 |            |              |
|             | [place]  [id number]     |                 |            |              |
+-------------+--------------------------+-----------------+------------+--------------+
| Differentia | AUTOMATEDComment:        |                 | EXTERNAL   |              |
| l Type      | Testing performed at     |                 | LAB        |              |
|             | [facility], [address]   |                 |            |              |
|             | [place]      |                 |            |              |
|             | [id number]                    |                 |            |              |
+-------------+--------------------------+-----------------+------------+--------------+
| % Segmented | 84.85Comment: Testing    | %               | EXTERNAL   |              |
|             | performed at [facility], [address] |                 | LAB        |              |
| Neutrophils |  [place],        |                 |            |              |
|             | [place]  [id number]     |                 |            |              |
+-------------+--------------------------+-----------------+------------+--------------+
| %           | 9.33Comment: Testing     | %               | EXTERNAL   |              |
| Lymphocytes | performed at [facility], [address] |                 | LAB        |              |
|             |  [address],        |                 |            |              |
|             | [place]  [id number]     |                 |            |              |
+-------------+--------------------------+-----------------+------------+--------------+
| % Monocytes | 5.69Comment: Testing     | %               | EXTERNAL   |              |
|             | performed at [facility], [address] |                 | LAB        |              |
|             |  [address],        |                 |            |              |
|             | [place]  [id number]     |                 |            |              |
+-------------+--------------------------+-----------------+------------+--------------+
| %           | 0.00Comment: Testing     | %               | EXTERNAL   |              |
| Eosinophils | performed at [facility], [address] |                 | LAB        |              |
|             |  [address],        |                 |            |              |
|             | [place]  [id number]     |                 |            |              |
+-------------+--------------------------+-----------------+------------+--------------+
| % Basophils | 0.13Comment: Testing     | %               | EXTERNAL   |              |
|             | performed at [facility], [address] |                 | LAB        |              |
|             |  [address],        |                 |            |              |
|             | [place]  [id number]     |                 |            |              |
+-------------+--------------------------+-----------------+------------+--------------+
| Absolute    | 12.89 (H)Comment:        | 1.90 - 7.40     | EXTERNAL   |              |
| Segmented   | Testing performed at     | K/uL            | LAB        |              |
| Neutrophils | [facility], [address]   |                 |            |              |
|             | [place]      |                 |            |              |
|             | [id number]                    |                 |            |              |
+-------------+--------------------------+-----------------+------------+--------------+
| Absolute    | 1.42Comment: Testing     | 1.00 - 3.90     | EXTERNAL   |              |
| Lymphocytes | performed at [facility], [address] | K/uL            | LAB        |              |
|             |  [place],        |                 |            |              |
|             | [place]  [id number]     |                 |            |              |
+-------------+--------------------------+-----------------+------------+--------------+
| Absolute    | 0.87 (H)Comment: Testing | 0.00 - 0.80     | EXTERNAL   |              |
| Monocytes   |  performed at [facility], [address]  | K/uL            | LAB        |              |
|             | [address],       |                 |            |              |
|             | [place]  [id number]     |                 |            |              |
+-------------+--------------------------+-----------------+------------+--------------+
| Absolute    | 0.00Comment: Testing     | 0.00 - 0.50     | EXTERNAL   |              |
| Eosinophils | performed at [facility], [address] | K/uL            | LAB        |              |
|             |  [address],        |                 |            |              |
|             | [place]  [address]     |                 |            |              |
+-------------+--------------------------+-----------------+------------+--------------+
 
| Absolute    | 0.02Comment: Testing     | 0.00 - 0.10     | EXTERNAL   |              |
| Basophils   | performed at [facility], [address]            | LAB        |              |
|             |  [address],        |                 |            |              |
|             | [place]  [address]     |                 |            |              |
+-------------+--------------------------+-----------------+------------+--------------+
 
 
 
+-----------------+
| Specimen        |
+-----------------+
| Blood specimen  |
| (specimen)      |
+-----------------+
 
 
 
+----------------+---------+--------------------+--------------+
| Performing     | Address | City/State/Zipcode | Phone Number |
| Organization   |         |                    |              |
+----------------+---------+--------------------+--------------+
|   EXTERNAL LAB |         |                    |              |
+----------------+---------+--------------------+--------------+
 Tissue Request For Pathology (2016 12:00 AM PDT)
 
+---------------------+
| Specimen            |
+---------------------+
| Soft tissue sample  |
| (specimen)          |
+---------------------+
 
 
 
+------------------------------------------------------------------------+----------------+
| Narrative                                                              | Performed At   |
+------------------------------------------------------------------------+----------------+
|   SPECIMEN(S): A UTERUS, BILATERAL FALLOPIAN TUBES   Rt. OVARY AND     |   EXTERNAL LAB |
| FIBROID  SPECIMEN(S): B OMENTAL BIOPSY  SPECIMEN(S): C SKIN BX Rt.     |                |
| BUTTOCK     SPECIMEN SOURCE:  A. UTERUS, BILATERAL FALLOPIAN TUBES     |                |
|   Rt. OVARY AND FIBROID  B. OMENTAL BIOPSY  C. SKIN BX Rt. BUTTOCK     |                |
| CLINICAL HISTORY:  2016.   Leiomyoma of uterus, pelvic px, large |                |
|  fibroid.  FINAL PATHOLOGIC DIAGNOSIS:  A. Uterus, bilateral fallopian |                |
|  tubes, right ovary and fibroid, hysterectomy with bilateral           |                |
| salpingectomy, myomectomy and right oophorectomy:         -            |                |
| Unremarkable endocervical and ectocervical epithelia.         -Uterus  |                |
| with early secretory phase endometrium, adenomyosis and leiomyomata.   |                |
|        -Unremarkable fallopian tubes.         -Ovary with corpora      |                |
| albicantia, hemorrhagic corpus luteum and cystic follicles.            |                |
| -Detached nodule consists of an edematous and partly hyalinized        |                |
| leiomyoma.     B. Omentum, biopsy:         -Omental adipose tissue     |                |
| with focal fibrosis.     C. Skin, right buttocks, biopsy:         -    |                |
| Seborrheic keratosis.  COMMENT:  There is thickening of the surface    |                |
| epithelium with characteristic architectural features of a seborrheic  |                |
| keratosis.   Atypical features are not seen.  GROSS DESCRIPTION:       |                |
| Three specimens are received in three containers, labeled with the     |                |
| patient's name:     A. Received in formalin designated "uterus, cervix |                |
|  and bilateral fallopian tubes right ovary, fibroid (1251 g, per       |                |
| requisition)" consists of a 1135 gram uterus with fallopian tube and   |                |
| the truncated  cervix. Also present within the container is one ovary  |                |
 
| and one fallopian tube. Separate within the container is a rubbery     |                |
| nodule. The irregular uterus is 19.6 x 13.5 x 9.8 cm. The serosal      |                |
| surface is  violaceous and smooth with 1 defect that is 1.7 cm in      |                |
| greatest dimension and multiple clear watery fluid filled cystic       |                |
| structures present on the fundal aspect of the uterus. Separate within |                |
|  the  container is a 5.6 x 3.4 x 2.8 cm cervix. The ectocervical       |                |
| mucosa is pale pink and smooth with an attached portion of vaginal     |                |
| cuff that is 3.4 x 2.6 x 0.7 cm. Serial sectioning of the cervix fails |                |
|  to  demonstrate any gross abnormalities. The irregular endometrial    |                |
| cavity is lined by pink smooth and soft endometrium that has an        |                |
| average thickness of 0.6 cm. Sectioning through the uterus reveals     |                |
| pink  markedly trabeculated myometrium with one pink cystic intramural |                |
|  nodule that is 15.6 x 10.5 x 9.4 cm. Also present are multiple pink   |                |
| ill-defined intramural nodules.     The attached fallopian tube is 2.6 |                |
|  x 0.8 cm. The serosal surface is violaceous and smooth with delicate  |                |
| fimbriae. Cut sections reveal a pinpoint lumen. The first ovary is 3.8 |                |
|  x 3.5 x 1.9 cm. The  external surface is pink and cerebriform with 1  |                |
| defect that is 1.6 cm in greatest dimension. Serially sectioning       |                |
| reveals a pink-white variegated ovarian parenchyma with one            |                |
| hemorrhagic corpus luteum  that is associated with a previously        |                |
| described external defect. No papillary excrescences are grossly       |                |
| identified.  The second and  fallopian tube is 3.3 x 0.8 cm.  |                |
| The serosal surface is violaceous and smooth with delicate fimbriae.   |                |
| Cut sections reveal a pinpoint lumen. Also present within the          |                |
| container is  a 3.4 x 2.8 x 2.1 cm pink-white rubbery nodule. The      |                |
| external surface is smooth. Serially sectioning reveals a pink-white   |                |
| rubbery cut surface with one area of softening that is 1.4 cm in       |                |
| greatest  dimension. Representative sections are submitted in 11       |                |
| cassettes.     Cassette summary: (A1) cervix ; (A2) uterine wall; (A3) |                |
|  smaller intramural nodules; (A4-A9) largest intramural nodule; (A10)  |                |
| attached fallopian tube and ovary;(A11) second fallopian tube and      |                |
| nodule  separate within the container.     B. Received in formalin     |                |
| designated "omental biopsy", consists of 25.2 x 8.6 x 1.6 cm portion   |                |
| of yellow-tan multilobulated adipose tissue. Upon sectioning no mass   |                |
| lesions are grossly identified.  Representative sections are submitted |                |
|  in cassette B1.     C. Received in formalin designated "skin biopsy   |                |
| right buttock ", consists of a 2.3 x 1.7 x 0.7 cm unoriented portion   |                |
| of skin. The skin surface is gray-brown and multilobulated. The        |                |
| specimen is inked  black, sectioned and entirely submitted in cassette |                |
|  C1. FM     The gross description section of this report has been      |                |
| prepared using a voice recognition system. The report was reviewed for |                |
|  accuracy, however, sound-alike word errors, addition and/or deletions |                |
|  may  occur. If there is any question about this report please contact |                |
|  the originating pathologist.  MICROSCOPIC EXAMINATION:  A-C.          |                |
|   Histologic sections of all submitted blocks are examined by light    |                |
| microscopy.   These findings, together with the gross examination,     |                |
| support the pathologic diagnosis.  PERFORMING LABORATORY:              |                |
| Professional interpretation and technical preparation was performed by |                |
|  [facility], [facility], [address]    |                |
| [place] [address] (Medical Director:   Jamil Cuba M.D.;      |                |
| [facility]#:   [id number]).  Diagnostician:   Malcolm Asif MD  Pathologist   |                |
| Electronically Signed 2016                                       |                |
+------------------------------------------------------------------------+----------------+
 
 
 
+----------------+---------+--------------------+--------------+
| Performing     | Address | City/State/Zipcode | Phone Number |
| Organization   |         |                    |              |
+----------------+---------+--------------------+--------------+
 
|   EXTERNAL LAB |         |                    |              |
+----------------+---------+--------------------+--------------+
 Type and Screen (2016  7:45 AM PDT)
 
+-----------+------------------------+-----------+------------+--------------+
| Component | Value                  | Ref Range | Performed  | Pathologist  |
|           |                        |           | At         | Signature    |
+-----------+------------------------+-----------+------------+--------------+
| ABO Rh    | O POSITIVE             |           | EXTERNAL   |              |
|           |                        |           | LAB        |              |
+-----------+------------------------+-----------+------------+--------------+
| ABO Rh    | Testing performed at   |           | EXTERNAL   |              |
|           | [facility];[address]          |           | LAB        |              |
|           | [address]vd;[place] [address] |           |            |              |
+-----------+------------------------+-----------+------------+--------------+
| Antibody  | NEGATIVE               |           | EXTERNAL   |              |
| Screen    |                        |           | LAB        |              |
+-----------+------------------------+-----------+------------+--------------+
| Antibody  | Testing performed at   |           | EXTERNAL   |              |
| Screen    | [facility];[address]          |           | LAB        |              |
|           | [address]vd;[place] [address] |           |            |              |
+-----------+------------------------+-----------+------------+--------------+
| BB BAND   | [id number]               |           | EXTERNAL   |              |
|           |                        |           | LAB        |              |
+-----------+------------------------+-----------+------------+--------------+
| BB BAND   | Testing performed at   |           | EXTERNAL   |              |
|           | [facility];[address]          |           | LAB        |              |
|           | [address];[place] [address] |           |            |              |
+-----------+------------------------+-----------+------------+--------------+
 
 
 
+-----------------+
| Specimen        |
+-----------------+
| Blood specimen  |
| (specimen)      |
+-----------------+
 
 
 
+----------------+---------+--------------------+--------------+
| Performing     | Address | City/State/Zipcode | Phone Number |
| Organization   |         |                    |              |
+----------------+---------+--------------------+--------------+
|   EXTERNAL LAB |         |                    |              |
+----------------+---------+--------------------+--------------+
 Pregnancy, Urine, Qual (2016  7:45 AM PDT)
 
+-------------+--------------------------+-----------+------------+--------------+
| Component   | Value                    | Ref Range | Performed  | Pathologist  |
|             |                          |           | At         | Signature    |
+-------------+--------------------------+-----------+------------+--------------+
| Preg Test,  | NEGATIVEComment: Testing |           | EXTERNAL   |              |
| Ur          |  performed at [facility];[address]    |           | LAB        |              |
|             | [address];[place], [place]   |           |            |              |
|             | [address]                    |           |            |              |
+-------------+--------------------------+-----------+------------+--------------+
 
 
 
 
+-----------------+
| Specimen        |
+-----------------+
| Urine specimen  |
| (specimen)      |
+-----------------+
 
 
 
+----------------+---------+--------------------+--------------+
| Performing     | Address | City/State/Zipcode | Phone Number |
| Organization   |         |                    |              |
+----------------+---------+--------------------+--------------+
|   EXTERNAL LAB |         |                    |              |
+----------------+---------+--------------------+--------------+
 Medical Cytology (2016 12:00 AM PDT)
 
+----------+
| Specimen |
+----------+
|          |
+----------+
 
 
 
+------------------------------------------------------------------------+----------------+
| Narrative                                                              | Performed At   |
+------------------------------------------------------------------------+----------------+
|   ORDERING PHYSICIAN:  Lvaelle GLOVER, Kinza MATHIAS     PATIENT NAME:         |   EXTERNAL LAB |
| MARIE PEREZ  GENDER:   F       :   1975  SPECIMEN(S): A   |                |
| PELVIC WASHING  GROSS DESCRIPTION:   45 ML BLOODY FLUID  CLINICAL      |                |
| HISTORY:   NO CLINICAL DATA PROVIDED  LABORATORY PREPARATIONS:   1     |                |
| MONOLAYER, 1 CYTOLOGY CELL BLOCK  CYTOLOGIC INTERPRETATION:  Pelvic    |                |
| washing:  Negative for malignant cells.  DESCRIPTION:  The             |                |
| preparations contain mesothelial cells and rare inflammatory cells.    |                |
|   Atypical cytologic findings are not encountered.  SPECIMEN ADEQUACY: |                |
|   Satisfactory for Evaluation  PERFORMING LABORATORY:  Technical       |                |
| preparation was performed by [facility], [address] [place] [address]    |                |
| [place] [address] (Medical Director:   Brice [place] D.O.;  |                |
| CLIA#:   [id number]).  Professional interpretation was performed by    |                |
| [facility], [address],     |                |
| [place] [address] (Medical Director:   Jamil Cuba M.D.;       |                |
| CLIA#:   [id number]  Diagnostician:   Clem DAVILA([facility])           |                |
| Cytotechnologist  Diagnostician:   Isabelle Pacheco MD  Pathologist        |                |
| Electronically Signed 2016                                       |                |
+------------------------------------------------------------------------+----------------+
 
 
 
+----------------+---------+--------------------+--------------+
| Performing     | Address | City/State/Zipcode | Phone Number |
| Organization   |         |                    |              |
+----------------+---------+--------------------+--------------+
|   EXTERNAL LAB |         |                    |              |
+----------------+---------+--------------------+--------------+
 documented in this encounter
 
 Visit Diagnoses
 
 
 
+------------------------------------------------------------+
| Diagnosis                                                  |
+------------------------------------------------------------+
|   Preop examination  Preoperative examination, unspecified |
+------------------------------------------------------------+
|   Submucous leiomyoma of uterus                            |
+------------------------------------------------------------+
 documented in this encounter

## 2020-09-28 NOTE — XMS
Encounter Summary
  Created on: 2020
 
 Marie Fermin
 External Reference #: [id number]
 : [birth year]
 Sex: Female
 
 Demographics
 
 
+-----------------------+------------------------+
| Address               | [address]          |
|                       | [place]  [address]      |
+-----------------------+------------------------+
| Home Phone            | [phone]        |
+-----------------------+------------------------+
| Preferred Language    | Unknown                |
+-----------------------+------------------------+
| Marital Status        |                 |
+-----------------------+------------------------+
| [employer] Affiliation | Unknown                |
+-----------------------+------------------------+
| Race                  | White                  |
+-----------------------+------------------------+
| Ethnic Group          | Not  or  |
+-----------------------+------------------------+
 
 
 Author
 
 
+--------------+--------------------------------------------+
| Author       | [facility]  |
|              | [facility] [place]                                |
+--------------+--------------------------------------------+
| Organization | [facility]  |
|              | [facility] [place]                                |
+--------------+--------------------------------------------+
| Address      | Unknown                                    |
+--------------+--------------------------------------------+
| Phone        | Unavailable                                |
+--------------+--------------------------------------------+
 
 
 
 Support
 
 
+-----------------+--------------+---------+-----------------+
| Name            | Relationship | Address | Phone           |
+-----------------+--------------+---------+-----------------+
| Rodríguez Fermin | ECON         | Unknown | [phone] |
+-----------------+--------------+---------+-----------------+
 
 
 
 Care Team Providers
 
 
 
+-----------------------+------+-------------+
| Care Team Member Name | Role | Phone       |
+-----------------------+------+-------------+
 PCP  | Unavailable |
+-----------------------+------+-------------+
 
 
 
 Encounter Details
 
 
+--------+-----------+----------------------+----------------------+-------------+
| Date   | Type      | Department           | Care Team            | Description |
+--------+-----------+----------------------+----------------------+-------------+
| 10/11/ | [facility]  |   [facility] |   Magnus Watts |             |
|    | Encounter |  MED CTR XRAY  [address]  [address] [place] [address] |             |
|        |           |  [place]       |   [place]    |             |
|        |           | [place] [address] | [id number]  [phone]  |             |
|        |           |   [phone]       |  [phone] (Fax)  |             |
+--------+-----------+----------------------+----------------------+-------------+
 
 
 
 Social History
 
 
+----------------+-------+-----------+--------+------+
| Tobacco Use    | Types | Packs/Day | Years  | Date |
|                |       |           | Used   |      |
+----------------+-------+-----------+--------+------+
| Never Assessed |       |           |        |      |
+----------------+-------+-----------+--------+------+
 
 
 
+------------------+---------------+
| Sex Assigned at  | Date Recorded |
| Birth            |               |
+------------------+---------------+
| Not on file      |               |
+------------------+---------------+
 documented as of this encounter
 
 Plan of Treatment
 Not on filedocumented as of this encounter
 
 Visit Diagnoses
 Not on filedocumented in this encounter

## 2020-09-28 NOTE — XMS
Encounter Summary
  Created on: 2020
 
 Marie Fermin
 External Reference #: [id number]
 : [birth year]
 Sex: Female
 
 Demographics
 
 
+-----------------------+------------------------+
| Address               | [address]          |
|                       | [place]  [address]      |
+-----------------------+------------------------+
| Home Phone            | [phone]        |
+-----------------------+------------------------+
| Preferred Language    | Unknown                |
+-----------------------+------------------------+
| Marital Status        |                 |
+-----------------------+------------------------+
| Church Affiliation | Unknown                |
+-----------------------+------------------------+
| Race                  | White                  |
+-----------------------+------------------------+
| Ethnic Group          | Not  or  |
+-----------------------+------------------------+
 
 
 Author
 
 
+--------------+--------------------------------------------+
| Author       | [facility]  |
|              | and [place]                                |
+--------------+--------------------------------------------+
| Organization | [facility]  |
|              | and [place]                                |
+--------------+--------------------------------------------+
| Address      | Unknown                                    |
+--------------+--------------------------------------------+
| Phone        | Unavailable                                |
+--------------+--------------------------------------------+
 
 
 
 Support
 
 
+-----------------+--------------+---------+-----------------+
| Name            | Relationship | Address | Phone           |
+-----------------+--------------+---------+-----------------+
| Rodríguez Fermin | ECON         | Unknown | [phone] |
+-----------------+--------------+---------+-----------------+
 
 
 
 Care Team Providers
 
 
 
+-----------------------+------+-------------+
| Care Team Member Name | Role | Phone       |
+-----------------------+------+-------------+
| No, Physician         | PCP  | Unavailable |
+-----------------------+------+-------------+
 
 
 
 Encounter Details
 
 
+--------+-----------+----------------------+--------------------+-------------+
| Date   | Type      | Department           | Care Team          | Description |
+--------+-----------+----------------------+--------------------+-------------+
| 08/15/ | Hospital  |   [facility]    |   Conversion       |             |
|    | Encounter | [facility]  | Transaction,       |             |
|        |           |  [address]      | Provider Unknown   |             |
|        |           | [place], [place]         |        |             |
|        |           | [address]           |  (Fax) |             |
|        |           | [phone]         |                    |             |
+--------+-----------+----------------------+--------------------+-------------+
 
 
 
 Social History
 
 
+--------------------+-------+-----------+--------+------+
| Tobacco Use        | Types | Packs/Day | Years  | Date |
|                    |       |           | Used   |      |
+--------------------+-------+-----------+--------+------+
| Current Every Day  |       | 1         |        |      |
| Smoker             |       |           |        |      |
+--------------------+-------+-----------+--------+------+
 
 
 
+---------------------+---+---+---+
| Smokeless Tobacco:  |   |   |   |
| Never Used          |   |   |   |
+---------------------+---+---+---+
 
 
 
+-------------+-------------+---------+----------+
| Alcohol Use | Drinks/Week | oz/Week | Comments |
+-------------+-------------+---------+----------+
| Not Asked   |             |         |          |
+-------------+-------------+---------+----------+
 
 
 
+------------------+---------------+
| Sex Assigned at  | Date Recorded |
| Birth            |               |
+------------------+---------------+
| Not on file      |               |
+------------------+---------------+
 
 documented as of this encounter
 
 Medications at Time of Discharge
 
 
+----------------------+----------------------+-----------+---------+----------+-----------+
| Medication           | Sig                  | Dispensed | Refills | Start    | End Date  |
|                      |                      |           |         | Date     |           |
+----------------------+----------------------+-----------+---------+----------+-----------+
|   ibuprofen (ADVIL,  | Take 400 mg by mouth |           | 0       |          |           |
| MOTRIN) 200 mg       |  every 6 hours as    |           |         |          |           |
| tablet               | needed for Pain.     |           |         |          |           |
+----------------------+----------------------+-----------+---------+----------+-----------+
|   albuterol 2.5 mg/3 | Use one vial in your |   60 vial | 0       | 20 | 10/08/201 |
|  mL nebulizer        |  nebulizer every 4   |           |         | 15       | 6         |
| solutionIndications: | hours as needed for  |           |         |          |           |
|  Asthma, mild        | wheezing             |           |         |          |           |
| intermittent,        |                      |           |         |          |           |
| uncomplicated        |                      |           |         |          |           |
+----------------------+----------------------+-----------+---------+----------+-----------+
|   diazepam (VALIUM)  | Take 1 tablet by     |   1       | 0       | 20 | 10/08/201 |
| 10 MG tablet         | mouth as needed (1   | tablet    |         | 16       | 6         |
|                      | hour before MRI) for |           |         |          |           |
|                      |  up to 1 dose.       |           |         |          |           |
+----------------------+----------------------+-----------+---------+----------+-----------+
|   meloxicam (MOBIC)  | Take 1 tablet by     |   30      | 0       | 20 | 10/08/201 |
| 15 mg                | mouth Daily as       | tablet    |         | 16       | 6         |
| tabletIndications:   | needed for Pain.     |           |         |          |           |
| Right-sided low back |                      |           |         |          |           |
|  pain with           |                      |           |         |          |           |
| right-sided          |                      |           |         |          |           |
| sciatica,            |                      |           |         |          |           |
| unspecified          |                      |           |         |          |           |
| chronicity           |                      |           |         |          |           |
+----------------------+----------------------+-----------+---------+----------+-----------+
|   methylPREDNISolone | follow package       |   21      | 0       | 20 | 10/08/201 |
|  (MEDROL DOSEPAK) 4  | directions           | tablet    |         | 16       | 6         |
| mg tablet            |                      |           |         |          |           |
+----------------------+----------------------+-----------+---------+----------+-----------+
|                      | Take  by mouth.      |           | 0       |          | 10/08/201 |
| Djbiybggq-JCQ-MM-APA |                      |           |         |          | 6         |
| P (DIMETAPP          |                      |           |         |          |           |
| MULTISYMPTOM         |                      |           |         |          |           |
| COLD/FLU PO)         |                      |           |         |          |           |
+----------------------+----------------------+-----------+---------+----------+-----------+
|   PROAIR  (90 | inhale 2 puffs by    |   8.5 g   | 3       | 20 | 10/08/201 |
|  BASE) MCG/ACT       | mouth every 4 hours  |           |         | 16       | 6         |
| inhaler              | if needed for        |           |         |          |           |
|                      | wheezing             |           |         |          |           |
+----------------------+----------------------+-----------+---------+----------+-----------+
|   traMADol (ULTRAM)  | One tablet orally    |   30      | 0       | 20 | 10/08/201 |
| 50 mg                | every 4 hours as     | tablet    |         | 16       | 6         |
| tabletIndications:   | needed for pain      |           |         |          |           |
| Low back pain,       |                      |           |         |          |           |
| unspecified back     |                      |           |         |          |           |
| pain laterality,     |                      |           |         |          |           |
| unspecified          |                      |           |         |          |           |
| chronicity, with     |                      |           |         |          |           |
| sciatica presence    |                      |           |         |          |           |
| unspecified          |                      |           |         |          |           |
 
+----------------------+----------------------+-----------+---------+----------+-----------+
 documented as of this encounter
 
 Plan of Treatment
 Not on filedocumented as of this encounter
 
 Visit Diagnoses
 Not on filedocumented in this encounter

## 2020-09-28 NOTE — XMS
Encounter Summary
  Created on: 2020
 
 Marie Fermin
 External Reference #: [id number]
 : [birth year]
 Sex: Female
 
 Demographics
 
 
+-----------------------+------------------------+
| Address               | [address]          |
|                       | [place]  [address]      |
+-----------------------+------------------------+
| Home Phone            | [phone]        |
+-----------------------+------------------------+
| Preferred Language    | Unknown                |
+-----------------------+------------------------+
| Marital Status        |                 |
+-----------------------+------------------------+
| Catholic Affiliation | Unknown                |
+-----------------------+------------------------+
| Race                  | White                  |
+-----------------------+------------------------+
| Ethnic Group          | Not  or  |
+-----------------------+------------------------+
 
 
 Author
 
 
+--------------+--------------------------------------------+
| Author       | [facility]  |
|              | and [place]                                |
+--------------+--------------------------------------------+
| Organization | [facility]  |
|              | and [place]                                |
+--------------+--------------------------------------------+
| Address      | Unknown                                    |
+--------------+--------------------------------------------+
| Phone        | Unavailable                                |
+--------------+--------------------------------------------+
 
 
 
 Support
 
 
+-----------------+--------------+---------+-----------------+
| Name            | Relationship | Address | Phone           |
+-----------------+--------------+---------+-----------------+
| Rodríguez Fermin | ECON         | Unknown | [phone] |
+-----------------+--------------+---------+-----------------+
 
 
 
 Care Team Providers
 
 
 
+-----------------------+------+-------------+
| Care Team Member Name | Role | Phone       |
+-----------------------+------+-------------+
| No, Physician         | PCP  | Unavailable |
+-----------------------+------+-------------+
 
 
 
 Reason for Visit
 
 
+-------------+----------+
| Reason      | Comments |
+-------------+----------+
| Neck Injury |          |
+-------------+----------+
 
 
 
 Encounter Details
 
 
+--------+---------+----------------------+---------------------+----------------------+
| Date   | Type    | Department           | Care Team           | Description          |
+--------+---------+----------------------+---------------------+----------------------+
| / | Office  |   [facility]          |   Cathi Osorio  | Cervical strain,     |
|    | Visit   | [facility]  | MD Becky  [address]     | subsequent encounter |
|        |         | [address]    | [address]  [place]   |  (Primary Dx); Place |
|        |         | [address]  [place] | [place] [address]     |  of occurrence,      |
|        |         |  [place]           | [phone]        | industrial places    |
|        |         | [address]           | [phone] (Fax)  | and premises;        |
|        |         | [phone]         |                     | Civilian activity    |
|        |         |                      |                     | done for income or   |
|        |         |                      |                     | pay                  |
+--------+---------+----------------------+---------------------+----------------------+
 
 
 
 Social History
 
 
+--------------------+-------+-----------+--------+------+
| Tobacco Use        | Types | Packs/Day | Years  | Date |
|                    |       |           | Used   |      |
+--------------------+-------+-----------+--------+------+
| Current Every Day  |       | 1         |        |      |
| Smoker             |       |           |        |      |
+--------------------+-------+-----------+--------+------+
 
 
 
+---------------------+---+---+---+
| Smokeless Tobacco:  |   |   |   |
| Never Used          |   |   |   |
+---------------------+---+---+---+
 
 
 
 
+-------------+-------------+---------+----------+
| Alcohol Use | Drinks/Week | oz/Week | Comments |
+-------------+-------------+---------+----------+
| Not Asked   |             |         |          |
+-------------+-------------+---------+----------+
 
 
 
+------------------+---------------+
| Sex Assigned at  | Date Recorded |
| Birth            |               |
+------------------+---------------+
| Not on file      |               |
+------------------+---------------+
 documented as of this encounter
 
 Last Filed Vital Signs
 
 
+-------------------+---------------------+----------------------+----------+
| Vital Sign        | Reading             | Time Taken           | Comments |
+-------------------+---------------------+----------------------+----------+
| Blood Pressure    | 104/64              | 2014  8:14 AM  |          |
|                   |                     | PST                  |          |
+-------------------+---------------------+----------------------+----------+
| Pulse             | 72                  | 2014  8:14 AM  |          |
|                   |                     | PST                  |          |
+-------------------+---------------------+----------------------+----------+
| Temperature       | 36.4   C (97.6   F) | 2014  8:14 AM  |          |
|                   |                     | PST                  |          |
+-------------------+---------------------+----------------------+----------+
| Respiratory Rate  | 16                  | 2014  8:14 AM  |          |
|                   |                     | PST                  |          |
+-------------------+---------------------+----------------------+----------+
| Oxygen Saturation | -                   | -                    |          |
+-------------------+---------------------+----------------------+----------+
| Inhaled Oxygen    | -                   | -                    |          |
| Concentration     |                     |                      |          |
+-------------------+---------------------+----------------------+----------+
| Weight            | 98.9 kg (218 lb)    | 2014  8:14 AM  |          |
|                   |                     | PST                  |          |
+-------------------+---------------------+----------------------+----------+
| Height            | 162.6 cm (5' 4")    | 2014  8:14 AM  |          |
|                   |                     | PST                  |          |
+-------------------+---------------------+----------------------+----------+
| Body Mass Index   | 37.42               | 2014  8:14 AM  |          |
|                   |                     | PST                  |          |
+-------------------+---------------------+----------------------+----------+
 documented in this encounter
 
 Progress Cathi Zhao MD - 2014 12:28 PM PSTEmployer: [employer]
 Guarantor: [employer]
 Date of injury: 14
 Claim number: [id number]
 
 Chief complaint: Followup, closing evaluation
 
 Subjective:
 Patient is a 39-year-old female who presents today for scheduled followup and closing evalu
 
ation.  Original injury occurred when she slipped and sustained a near fall type injury resu
lting in a cervical strain.  She was treated conservatively.  Because of increased muscle ti
ghtness and headaches she was referred for chiropractic manipulation.  She has completed her
 approved medical treatments and has done well.  She has been return to regular work respons
ibilities and has had no problems.  This time she has no complaints.  She states she has no 
pain in the neck.  No radiation of pain to the arms or hands.  No numbness, tingling, or wea
kness of the extremities.  Headaches have significantly improved to the point of being near 
gone.  At this time she has no complaints.
 
 Objective:
 Vital signs as noted, nursing notes reviewed.
 General-well-developed well-nourished in no apparent distress, pleasant and cooperative.
 Neck-normal to inspection.  No paraspinous muscle spasticity.  No vertebral point tendernes
s, crepitus, or step-off.  No tender points elicited with palpation.  Full range of motion w
ithout pain or limitation.  Negative Spurling testing bilaterally.
 Neuro--Motor strength 5/5 bilat upper extremities
 DTR's biceps,triceps, brachioradialis 2+/4 bilat
 Sensation grossly intact to light touch bilat upper extremities
 
 Imaging/diagnostics:
 None indicated for this injury.
 
 Pending interventions:
  none
 
 Assessment:
 1.  Cervical strain
 
 Plan:
 Injured worker has finished with approved medical treatments, returned to regular work resp
onsibilities and is doing well.  After addressing their questions, they are being released f
[facility] care and will followup on an as-needed basis.
 
 Injured worker is at [facility].  No further curative medical treatments are indicated.
 No need for work accommodations or vocational services.
 No permanent impairment as a consequence of this injury.
 
 This note was dictated using dragon voice recognition software.  Occasional wrong- word or 
sound-alike substitutions may have occurred due to the inherent limitations of voice recogni
tion software.  Please read the chart carefully and recognize, using context, where these scott
bstitutions have occurred. 
 
 Electronically signed by Cathi Osorio MD at 2014 12:33 PM PSTdocumented in th
is encounter
 
 Miscellaneous Notes
 Plan of Care - ONBASE SCAN WAMT - 2014 12:00 AM PSTElectronically signed by Onbase, W
amt at 2015  2:30 PM PSTdocumented in this encounter
 
 Plan of Treatment
 Not on filedocumented as of this encounter
 
 Visit Diagnoses
 
 
+-------------------------------------------------------+
| Diagnosis                                             |
+-------------------------------------------------------+
|   Cervical strain, subsequent encounter - Primary     |
+-------------------------------------------------------+
 
|   Place of occurrence, industrial places and premises |
+-------------------------------------------------------+
|   Civilian activity done for income or pay            |
+-------------------------------------------------------+
 documented in this encounter

## 2020-09-28 NOTE — XMS
Encounter Summary
  Created on: 2020
 
 aMrie Fermin
 External Reference #: [id number]
 : [birth year]
 Sex: Female
 
 Demographics
 
 
+-----------------------+------------------------+
| Address               | [address]          |
|                       | [place]  [address]      |
+-----------------------+------------------------+
| Home Phone            | [phone]        |
+-----------------------+------------------------+
| Preferred Language    | Unknown                |
+-----------------------+------------------------+
| Marital Status        |                 |
+-----------------------+------------------------+
| Confucianist Affiliation | Unknown                |
+-----------------------+------------------------+
| Race                  | White                  |
+-----------------------+------------------------+
| Ethnic Group          | Not  or  |
+-----------------------+------------------------+
 
 
 Author
 
 
+--------------+--------------------------------------------+
| Author       | [facility]  |
|              | and [place]                                |
+--------------+--------------------------------------------+
| Organization | [facility]  |
|              | and [place]                                |
+--------------+--------------------------------------------+
| Address      | Unknown                                    |
+--------------+--------------------------------------------+
| Phone        | Unavailable                                |
+--------------+--------------------------------------------+
 
 
 
 Support
 
 
+-----------------+--------------+---------+-----------------+
| Name            | Relationship | Address | Phone           |
+-----------------+--------------+---------+-----------------+
| Rodríguez Fermin | ECON         | Unknown | [phone] |
+-----------------+--------------+---------+-----------------+
 
 
 
 Care Team Providers
 
 
 
+-----------------------+------+-------------+
| Care Team Member Name | Role | Phone       |
+-----------------------+------+-------------+
| No, Physician         | PCP  | Unavailable |
+-----------------------+------+-------------+
 
 
 
 Reason for Visit
 
 
+--------------+--------+----------+
| Reason       | Onset  | Comments |
|              | Date   |          |
+--------------+--------+----------+
| Imaging Only | 06/10/ |          |
|              |    |          |
+--------------+--------+----------+
 
 
 
 Encounter Details
 
 
+--------+-----------+----------------------+---------------------+--------------+
| Date   | Type      | Department           | Care Team           | Description  |
+--------+-----------+----------------------+---------------------+--------------+
| 06/10/ | Telephone |   [facility]   |   Yary Jeff,  | Imaging Only |
| 2016   |           | CARE  [address] | MD  Need updated    |              |
|        |           |   [place]    | address             |              |
|        |           | [address]           |                     |              |
|        |           | [phone]         |                     |              |
+--------+-----------+----------------------+---------------------+--------------+
 
 
 
 Social History
 
 
+--------------------+-------+-----------+--------+------+
| Tobacco Use        | Types | Packs/Day | Years  | Date |
|                    |       |           | Used   |      |
+--------------------+-------+-----------+--------+------+
| Current Every Day  |       | 1         |        |      |
| Smoker             |       |           |        |      |
+--------------------+-------+-----------+--------+------+
 
 
 
+---------------------+---+---+---+
| Smokeless Tobacco:  |   |   |   |
| Never Used          |   |   |   |
+---------------------+---+---+---+
 
 
 
+-------------+-------------+---------+----------+
| Alcohol Use | Drinks/Week | oz/Week | Comments |
 
+-------------+-------------+---------+----------+
| Not Asked   |             |         |          |
+-------------+-------------+---------+----------+
 
 
 
+------------------+---------------+
| Sex Assigned at  | Date Recorded |
| Birth            |               |
+------------------+---------------+
| Not on file      |               |
+------------------+---------------+
 documented as of this encounter
 
 Miscellaneous Notes
 Telephone Encounter - Yary Jeff MD - 06/10/2016 11:37 AM PDTPt needs to get a PMD.  
If she needs something for MRI sedation, needs to come back here.Electronically signed by Ra Jeff MD at 06/10/2016 11:38 AM PDTTelephone Encounter - Karina Enriquez Cert MA - [facility] could not finish her M
RI due due to her claustrophobia. Imaging wanted to let you know and if need to reschedule p
atient would need medication.
 Electronically signed by Lakeisha Keith MA at 06/10/2016 11:25 AM PDTdocumented in this
 encounter
 
 Plan of Treatment
 Not on filedocumented as of this encounter
 
 Visit Diagnoses
 Not on filedocumented in this encounter

## 2020-09-28 NOTE — XMS
Encounter Summary
  Created on: 2020
 
 Marie Fermin
 External Reference #: [id number]
 : [birth year]
 Sex: Female
 
 Demographics
 
 
+-----------------------+------------------------+
| Address               | [address]          |
|                       | [place]  [address]      |
+-----------------------+------------------------+
| Home Phone            | [phone]        |
+-----------------------+------------------------+
| Preferred Language    | Unknown                |
+-----------------------+------------------------+
| Marital Status        |                 |
+-----------------------+------------------------+
| Shinto Affiliation | Unknown                |
+-----------------------+------------------------+
| Race                  | White                  |
+-----------------------+------------------------+
| Ethnic Group          | Not  or  |
+-----------------------+------------------------+
 
 
 Author
 
 
+--------------+--------------------------------------------+
| Author       | [facility]  |
|              | and [place]                                |
+--------------+--------------------------------------------+
| Organization | [facility]  |
|              | and [place]                                |
+--------------+--------------------------------------------+
| Address      | Unknown                                    |
+--------------+--------------------------------------------+
| Phone        | Unavailable                                |
+--------------+--------------------------------------------+
 
 
 
 Support
 
 
+-----------------+--------------+---------+-----------------+
| Name            | Relationship | Address | Phone           |
+-----------------+--------------+---------+-----------------+
| Rodríguez Fermin | ECON         | Unknown | [phone] |
+-----------------+--------------+---------+-----------------+
 
 
 
 Care Team Providers
 
 
 
+-----------------------+------+-------------+
| Care Team Member Name | Role | Phone       |
+-----------------------+------+-------------+
 PCP  | Unavailable |
+-----------------------+------+-------------+
 
 
 
 Encounter Details
 
 
+--------+-----------+----------------------+-------------------+-------------+
| Date   | Type      | Department           | Care Team         | Description |
+--------+-----------+----------------------+-------------------+-------------+
| 02/15/ | [facility]  |   [facility] |   Gurwinder Osorio  |             |
|    | Encounter |  MED CTR XRAY  [address] | MD Geraldo  [address]  |             |
|        |           |  [address] [place]       | updated address   |             |
|        |           | [place] [address] |                   |             |
|        |           |   [phone]       |                   |             |
+--------+-----------+----------------------+-------------------+-------------+
 
 
 
 Social History
 
 
+----------------+-------+-----------+--------+------+
| Tobacco Use    | Types | Packs/Day | Years  | Date |
|                |       |           | Used   |      |
+----------------+-------+-----------+--------+------+
| Never Assessed |       |           |        |      |
+----------------+-------+-----------+--------+------+
 
 
 
+------------------+---------------+
| Sex Assigned at  | Date Recorded |
| Birth            |               |
+------------------+---------------+
| Not on file      |               |
+------------------+---------------+
 documented as of this encounter
 
 Plan of Treatment
 Not on filedocumented as of this encounter
 
 Visit Diagnoses
 Not on filedocumented in this encounter

## 2020-09-28 NOTE — XMS
Encounter Summary
  Created on: 2020
 
 Marie Fermin
 External Reference #: [id number]
 : [birth year]
 Sex: Female
 
 Demographics
 
 
+-----------------------+------------------------+
| Address               | [address]          |
|                       | [place]  [address]      |
+-----------------------+------------------------+
| Home Phone            | [phone]        |
+-----------------------+------------------------+
| Preferred Language    | Unknown                |
+-----------------------+------------------------+
| Marital Status        |                 |
+-----------------------+------------------------+
| Nondenominational Affiliation | Unknown                |
+-----------------------+------------------------+
| Race                  | White                  |
+-----------------------+------------------------+
| Ethnic Group          | Not  or  |
+-----------------------+------------------------+
 
 
 Author
 
 
+--------------+--------------------------------------------+
| Author       | [facility] [place]  |
|              | and [place]                                |
+--------------+--------------------------------------------+
| Organization | [facility] [place]  |
|              | and [place]                                |
+--------------+--------------------------------------------+
| Address      | Unknown                                    |
+--------------+--------------------------------------------+
| Phone        | Unavailable                                |
+--------------+--------------------------------------------+
 
 
 
 Support
 
 
+-----------------+--------------+---------+-----------------+
| Name            | Relationship | Address | Phone           |
+-----------------+--------------+---------+-----------------+
| Rodríguez Fermin | ECON         | Unknown | [phone] |
+-----------------+--------------+---------+-----------------+
 
 
 
 Care Team Providers
 
 
 
+-----------------------+------+-------------+
| Care Team Member Name | Role | Phone       |
+-----------------------+------+-------------+
| No, Physician         | PCP  | Unavailable |
+-----------------------+------+-------------+
 
 
 
 Reason for Visit
 
 
+--------+-----------------------------+
| Reason | Comments                    |
+--------+-----------------------------+
| Cyst   | rm 3 cyst on tail bone for  |
+--------+-----------------------------+
 
 
 
 Encounter Details
 
 
+--------+---------+----------------------+---------------------+----------------------+
| Date   | Type    | Department           | Care Team           | Description          |
+--------+---------+----------------------+---------------------+----------------------+
| / | Office  |   [facility]   |   Yary Jeff,  | Pilonidal cyst with  |
|    | Visit   | CARE  [address] | MD  Need updated    | abscess (Primary Dx) |
|        |         |   [place]    | address             |                      |
|        |         | [address]           |                     |                      |
|        |         | [phone]         |                     |                      |
+--------+---------+----------------------+---------------------+----------------------+
 
 
 
 Social History
 
 
+--------------------+-------+-----------+--------+------+
| Tobacco Use        | Types | Packs/Day | Years  | Date |
|                    |       |           | Used   |      |
+--------------------+-------+-----------+--------+------+
| Current Every Day  |       | 1         |        |      |
| Smoker             |       |           |        |      |
+--------------------+-------+-----------+--------+------+
 
 
 
+---------------------+---+---+---+
| Smokeless Tobacco:  |   |   |   |
| Never Used          |   |   |   |
+---------------------+---+---+---+
 
 
 
+-------------+-------------+---------+----------+
| Alcohol Use | Drinks/Week | oz/Week | Comments |
+-------------+-------------+---------+----------+
| Not Asked   |             |         |          |
 
+-------------+-------------+---------+----------+
 
 
 
+------------------+---------------+
| Sex Assigned at  | Date Recorded |
| Birth            |               |
+------------------+---------------+
| Not on file      |               |
+------------------+---------------+
 documented as of this encounter
 
 Last Filed Vital Signs
 
 
+-------------------+----------------------+----------------------+----------+
| Vital Sign        | Reading              | Time Taken           | Comments |
+-------------------+----------------------+----------------------+----------+
| Blood Pressure    | 130/70               | 2017  8:05 AM  |          |
|                   |                      | PDT                  |          |
+-------------------+----------------------+----------------------+----------+
| Pulse             | 104                  | 2017  8:05 AM  |          |
|                   |                      | PDT                  |          |
+-------------------+----------------------+----------------------+----------+
| Temperature       | 36.8   C (98.2   F)  | 2017  8:05 AM  |          |
|                   |                      | PDT                  |          |
+-------------------+----------------------+----------------------+----------+
| Respiratory Rate  | 18                   | 2017  8:05 AM  |          |
|                   |                      | PDT                  |          |
+-------------------+----------------------+----------------------+----------+
| Oxygen Saturation | 97%                  | 2017  8:05 AM  |          |
|                   |                      | PDT                  |          |
+-------------------+----------------------+----------------------+----------+
| Inhaled Oxygen    | -                    | -                    |          |
| Concentration     |                      |                      |          |
+-------------------+----------------------+----------------------+----------+
| Weight            | 105.4 kg (232 lb 6.4 | 2017  8:05 AM  |          |
|                   |  oz)                 | PDT                  |          |
+-------------------+----------------------+----------------------+----------+
| Height            | -                    | -                    |          |
+-------------------+----------------------+----------------------+----------+
| Body Mass Index   | 38.67                | 2016  1:42 PM  |          |
|                   |                      | PST                  |          |
+-------------------+----------------------+----------------------+----------+
 documented in this encounter
 
 Patient Instructions
 Patient Instructions Yary Jeff MD - 2017  8:15 AM PDT
 Pilonidal Cyst, Infected (Antibiotic Treatment)
 A pilonidal cyst is a swelling that starts under the skin on the sacrum near the tail bone.
 It is present at birth and may look like a small dimple. It can fill with skin oils, hair, 
and dead skin cells,and may stay small or grow larger. Because it often has an opening to 
the surface, it may become infected with normal skin bacteria. A pilonidal cyst is different
 than a hemorrhoid.
 Causes
 Thecause of pilonidal cysts has been debated since they were first recognized. It may be 
present at birth and go unnoticed. It may appear like a small dimple. Injury, rubbing, or sk
in irritation may also cause pilonidal cysts. It can also be caused by aningrown hair. Mos
t likely, the cause is a combination of these things. Becausesome injury or irritation can
 lead to pilonidal cysts, it can be more common in people who sit or drive a lot for work.
 
 Symptoms
 A pilonidal cyst may be small and painless. Ifsymptoms occur, they may include:
  Swelling
  Irritation or redness
  Pain
  Drainage
 The cyst can swell and drain on its own. The swelling and drainage can come and go.
 Treatment
 If the infection is limited, it can possibly be treated with antibiotics alone. If the infe
ction is more severe or gets worse, it will need to be drained. Often this can be donewith
 a small incision under local anesthesia, but may need surgery to treat it or prevent it fro
m returning.
 Home care
 The following guidelines will help you care for your wound at home:
  Sit in a tub filled with about 6 inches ofhot water.Keep the water hot for a total o
f 10 to 15 minutes.
  Do not squeeze the pilonidal cyst or stick a need to drain it. It may feel better at [facility]
st, but it will make the infection worse, or spread it.
  Cover the cyst with a pad or something to prevent it from becoming more irritated, damag
ed, and painful.
 Medications
  Take acetaminophen or ibuprofen for pain, unless you were given a different pain medicin
e to use. If you have chronic liver or kidney disease, or have ever had a stomach ulcer or g
astrointestinal bleeding, or are taking blood thinner medications, talk with your doctor bef
ore using these medicines.
  If you were given antibiotics, take them until they are all gone. To make sure the infec
tion is cured, it is important to finish the antibiotics even if the wound looks better.
  Useantibiotic cream or ointment.
 Preventing future infections
 Once this infection has healed,the followingmay decrease the risk of future infections:
  Keep the area of the cyst clean by bathing or showering daily.
  Avoid tight-fitting clothing to minimize sweat and irritation of the skin.
  Recurrent pilonidal cysts may be completely removed by surgery, but this can only be don
e at a time when there is no infection. Ask your doctor for more information.
  Watchfor signs of infection listed below so that treatment may be started early.
 Follow-up care
 Follow up with your doctor as advised by our staff. Check your wound every day for the sign
s listed below.
 When to seek medical care
 Get prompt medical attention if any of the following occur:
  Pus coming from the cyst
  Increasing local pain, redness or swelling
  Fever over 100.4F (38.0C) for more than two days
 Date Last Reviewed: 3/26/2014
  4534-7142 [employer]. [address] [place] [address]. All righ
ts reserved. This information is not intended as a substitute for professional medical care.
 Always follow your healthcare professional's instructions.
 
 Electronically signed by Yary Jeff MD at 2017  8:15 AM PDT
 documented in this encounter
 
 Progress Notes
 Yary Jeff MD - 2017  8:09 AM PDTFormatting of this note might be different fro
m the original.
 Subjective: 
  
 Chief Complaint: Cyst
  
 
 History of Present Illness:
 
 Marie is a 41 y.o. female who comes in with her  complaining of pain and draining fr
om her pilonidal cyst.  She is not interested in having it removed permanently.   No other c
omplaints.
 
 Patient's medications, allergies, past medical, surgical, social and family histories were 
reviewed and updated as appropriate.
 Recently quit her job to stay home more and help with aging parents who live with them.  Gretchen villareal worked at [employer]
 ROS: see HPI
  
 Objective: 
 
 /70 mmHg | Pulse 104 | Temp(Src) 36.8 C (98.2 F) (Temporal) | Resp 18 | Wt 105.41
6 kg (232 lb 6.4 oz) | SpO2 97% | LMP 2016 (Exact Date)
 
 General Appearance:  Alert, cooperative, no distress, appears stated age
 At the Top of the gluteal fold there is a red punctate lesion 2 mm across.  There is no swe
lling or erythema.  When I press on the area there is a scant amount of pus that comes out o
f this red punctate lesion.  Mildly tender
 
 Chart review shows she was seen here in August and then November for pilonidal cyst abscess
 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
 Assessment and Plans: 
 
 1. Pilonidal cyst with abscess   
 There is nothing to roberto.
 She should put warm compresses on it because she does not have a bathtub and cannot do sitz
 baths.
 Cephalexin 500 mg 4 times a day for 7 days
 I offered to refer her to a general surgeon but she declined.
 Follow-up as needed
 
 This note was dictated using dragon voice recognition software. Occasional wrong- word or s
ound-alike substitutions may have occurred due to the inherent limitations of voice recognit
ion software. Please read the chart carefully and recognize, using context, where these subs
titutions have occurred. 
 
 Electronically signed by Yary Jeff MD at 2017  9:50 AM PDTdocumented in this en
counter
 
 Plan of Treatment
 Not on filedocumented as of this encounter
 
 Visit Diagnoses
 
 
+-----------------------------------------+
| Diagnosis                               |
+-----------------------------------------+
|   Pilonidal cyst with abscess - Primary |
+-----------------------------------------+
 documented in this encounter

## 2020-09-28 NOTE — XMS
Encounter Summary
  Created on: 2020
 
 Marie Fermin
 External Reference #: [id number]
 : [birth year]
 Sex: Female
 
 Demographics
 
 
+-----------------------+------------------------+
| Address               | [address]          |
|                       | [place]  [address]      |
+-----------------------+------------------------+
| Home Phone            | [phone]        |
+-----------------------+------------------------+
| Preferred Language    | Unknown                |
+-----------------------+------------------------+
| Marital Status        |                 |
+-----------------------+------------------------+
| Moravian Affiliation | Unknown                |
+-----------------------+------------------------+
| Race                  | White                  |
+-----------------------+------------------------+
| Ethnic Group          | Not  or  |
+-----------------------+------------------------+
 
 
 Author
 
 
+--------------+--------------------------------------------+
| Author       | [facility]  |
|              | and [place]                                |
+--------------+--------------------------------------------+
| Organization | [facility]  |
|              | [facility] [place]                                |
+--------------+--------------------------------------------+
| Address      | Unknown                                    |
+--------------+--------------------------------------------+
| Phone        | Unavailable                                |
+--------------+--------------------------------------------+
 
 
 
 Support
 
 
+-----------------+--------------+---------+-----------------+
| Name            | Relationship | Address | Phone           |
+-----------------+--------------+---------+-----------------+
| Rodríguez Fermin | ECON         | Unknown | [phone] |
+-----------------+--------------+---------+-----------------+
 
 
 
 Care Team Providers
 
 
 
+-----------------------+------+-------------+
| Care Team Member Name | Role | Phone       |
+-----------------------+------+-------------+
 PCP  | Unavailable |
+-----------------------+------+-------------+
 
 
 
 Encounter Details
 
 
+--------+-----------+----------------------+-------------------+-------------+
| Date   | Type      | Department           | Care Team         | Description |
+--------+-----------+----------------------+-------------------+-------------+
| / | [facility]  |   [facility] |   Gurwinder Osorio  |             |
|    | Encounter |  MED CTR XRAY  [address]  |             |
|        |           |  [address] [place]       | updated address   |             |
|        |           | [place] [address] |                   |             |
|        |           |   [phone]       |                   |             |
+--------+-----------+----------------------+-------------------+-------------+
 
 
 
 Social History
 
 
+----------------+-------+-----------+--------+------+
| Tobacco Use    | Types | Packs/Day | Years  | Date |
|                |       |           | Used   |      |
+----------------+-------+-----------+--------+------+
| Never Assessed |       |           |        |      |
+----------------+-------+-----------+--------+------+
 
 
 
+------------------+---------------+
| Sex Assigned at  | Date Recorded |
| Birth            |               |
+------------------+---------------+
| Not on file      |               |
+------------------+---------------+
 documented as of this encounter
 
 Plan of Treatment
 Not on filedocumented as of this encounter
 
 Visit Diagnoses
 Not on filedocumented in this encounter

## 2020-09-28 NOTE — XMS
Encounter Summary
  Created on: 2020
 
 Marie Fermin
 External Reference #: [id number]
 : [birth year]
 Sex: Female
 
 Demographics
 
 
+-----------------------+------------------------+
| Address               | [address]          |
|                       | [place]  [address]      |
+-----------------------+------------------------+
| Home Phone            | [phone]        |
+-----------------------+------------------------+
| Preferred Language    | Unknown                |
+-----------------------+------------------------+
| Marital Status        |                 |
+-----------------------+------------------------+
| Moravian Affiliation | Unknown                |
+-----------------------+------------------------+
| Race                  | White                  |
+-----------------------+------------------------+
| Ethnic Group          | Not  or  |
+-----------------------+------------------------+
 
 
 Author
 
 
+--------------+--------------------------------------------+
| Author       | [facility]  |
|              | and [place]                                |
+--------------+--------------------------------------------+
| Organization | [facility]  |
|              | and [place]                                |
+--------------+--------------------------------------------+
| Address      | Unknown                                    |
+--------------+--------------------------------------------+
| Phone        | Unavailable                                |
+--------------+--------------------------------------------+
 
 
 
 Support
 
 
+-----------------+--------------+---------+-----------------+
| Name            | Relationship | Address | Phone           |
+-----------------+--------------+---------+-----------------+
| Rodríguez Fermin | ECON         | Unknown | [phone] |
+-----------------+--------------+---------+-----------------+
 
 
 
 Care Team Providers
 
 
 
+-----------------------+------+-----------------+
| Care Team Member Name | Role | Phone           |
+-----------------------+------+-----------------+
| ROLDAN Horta NP   | PCP  | [phone] |
+-----------------------+------+-----------------+
 
 
 
 Encounter Details
 
 
+--------+----------+----------------------+----------------------+-------------+
| Date   | Type     | Department           | Care Team            | Description |
+--------+----------+----------------------+----------------------+-------------+
| 10/14/ | Imaging  |   [facility] |   Provider,          |             |
| 2019   | Exam     |  MED CTR EXTERNAL    | [id number] |             |
|        |          | IMAGING  [address]       |  [address]        |             |
|        |          | [address]  [place]     | [place] [address]     |             |
|        |          | [place] [address] |                      |             |
|        |          |   [phone]       |                      |             |
+--------+----------+----------------------+----------------------+-------------+
 
 
 
 Social History
 
 
+--------------------+-------+-----------+--------+------+
| Tobacco Use        | Types | Packs/Day | Years  | Date |
|                    |       |           | Used   |      |
+--------------------+-------+-----------+--------+------+
| Current Every Day  |       | 1         |        |      |
| Smoker             |       |           |        |      |
+--------------------+-------+-----------+--------+------+
 
 
 
+---------------------+---+---+---+
| Smokeless Tobacco:  |   |   |   |
| Never Used          |   |   |   |
+---------------------+---+---+---+
 
 
 
+-------------+-------------+---------+----------+
| Alcohol Use | Drinks/Week | oz/Week | Comments |
+-------------+-------------+---------+----------+
| Not Asked   |             |         |          |
+-------------+-------------+---------+----------+
 
 
 
+------------------+---------------+
| Sex Assigned at  | Date Recorded |
| Birth            |               |
+------------------+---------------+
| Not on file      |               |
+------------------+---------------+
 
 documented as of this encounter
 
 Plan of Treatment
 Not on filedocumented as of this encounter
 
 Procedures
 
 
+---------------------+--------+-------------+----------------------+----------------------+
| Procedure Name      | Priori | Date/Time   | Associated Diagnosis | Comments             |
|                     | ty     |             |                      |                      |
+---------------------+--------+-------------+----------------------+----------------------+
| XR LUMBAR SPINE     | Routin | 10/07/2019  |                      |   Results for this   |
| COMPLETE INCLUDING  | e      | 12:00 AM    |                      | procedure are in the |
| BENDING 6 + VW      |        | PDT         |                      |  results section.    |
+---------------------+--------+-------------+----------------------+----------------------+
 documented in this encounter
 
 Results
 XR Lumbar Spine Complete With Bending 6+ (10/07/2019 12:00 AM PDT)
 
+----------+
| Specimen |
+----------+
|          |
+----------+
 
 
 
+-----------------------------------------+---------------+
| Narrative                               | Performed At  |
+-----------------------------------------+---------------+
|   External films for comparison only    |   [facility] |
|                                         |               |
|  No results will be in the chart.       |               |
+-----------------------------------------+---------------+
 
 
 
+---------------+---------+--------------------+--------------+
| Performing    | Address | City/State/Zipcode | Phone Number |
| Organization  |         |                    |              |
+---------------+---------+--------------------+--------------+
|   [facility] |         |                    |              |
+---------------+---------+--------------------+--------------+
 documented in this encounter
 
 Visit Diagnoses
 Not on filedocumented in this encounter

## 2020-09-28 NOTE — XMS
Encounter Summary
  Created on: 2020
 
 Marie Fermin
 External Reference #: [id number]
 : [birth year]
 Sex: Female
 
 Demographics
 
 
+-----------------------+------------------------+
| Address               | [address]          |
|                       | [place]  [address]      |
+-----------------------+------------------------+
| Home Phone            | [phone]        |
+-----------------------+------------------------+
| Preferred Language    | Unknown                |
+-----------------------+------------------------+
| Marital Status        |                 |
+-----------------------+------------------------+
| Hindu Affiliation | Unknown                |
+-----------------------+------------------------+
| Race                  | White                  |
+-----------------------+------------------------+
| Ethnic Group          | Not  or  |
+-----------------------+------------------------+
 
 
 Author
 
 
+--------------+--------------------------------------------+
| Author       | [facility]  |
|              | and [place]                                |
+--------------+--------------------------------------------+
| Organization | [facility]  |
|              | and [place]                                |
+--------------+--------------------------------------------+
| Address      | Unknown                                    |
+--------------+--------------------------------------------+
| Phone        | Unavailable                                |
+--------------+--------------------------------------------+
 
 
 
 Support
 
 
+-----------------+--------------+---------+-----------------+
| Name            | Relationship | Address | Phone           |
+-----------------+--------------+---------+-----------------+
| Rodríguez Fermin | ECON         | Unknown | [phone] |
+-----------------+--------------+---------+-----------------+
 
 
 
 Care Team Providers
 
 
 
+-----------------------+------+-------------+
| Care Team Member Name | Role | Phone       |
+-----------------------+------+-------------+
| No, Physician         | PCP  | Unavailable |
+-----------------------+------+-------------+
 
 
 
 Encounter Details
 
 
+--------+-----------+----------------------+----------------------+-------------+
| Date   | Type      | Department           | Care Team            | Description |
+--------+-----------+----------------------+----------------------+-------------+
| / | [facility]  |   [facility] |   Segundo Botello    |             |
|    | Encounter |  [facility]  | Godwin Samayoa MD      |             |
|        |           |  [address]  [place] | [address]       |             |
|        |           |  [place]           | [place]      |             |
|        |           | [address]           | [address]  [phone]  |             |
|        |           | [phone]         |  [phone] (Fax)  |             |
+--------+-----------+----------------------+----------------------+-------------+
 
 
 
 Social History
 
 
+--------------------+-------+-----------+--------+------+
| Tobacco Use        | Types | Packs/Day | Years  | Date |
|                    |       |           | Used   |      |
+--------------------+-------+-----------+--------+------+
| Current Every Day  |       | 1         |        |      |
| Smoker             |       |           |        |      |
+--------------------+-------+-----------+--------+------+
 
 
 
+---------------------+---+---+---+
| Smokeless Tobacco:  |   |   |   |
| Never Used          |   |   |   |
+---------------------+---+---+---+
 
 
 
+-------------+-------------+---------+----------+
| Alcohol Use | Drinks/Week | oz/Week | Comments |
+-------------+-------------+---------+----------+
| Not Asked   |             |         |          |
+-------------+-------------+---------+----------+
 
 
 
+------------------+---------------+
| Sex Assigned at  | Date Recorded |
| Birth            |               |
+------------------+---------------+
| Not on file      |               |
+------------------+---------------+
 
 documented as of this encounter
 
 Medications at Time of Discharge
 
 
+----------------------+----------------------+-----------+---------+----------+-----------+
| Medication           | Sig                  | Dispensed | Refills | Start    | End Date  |
|                      |                      |           |         | Date     |           |
+----------------------+----------------------+-----------+---------+----------+-----------+
|   albuterol          | Inhale 2 puffs into  |   1       | 3       | 20 |  |
| (VENTOLIN HFA) 90    | the lungs every 4    | Inhaler   |         | 15       | 6         |
| mcg/puff             | hours as needed for  |           |         |          |           |
| inhalerIndications:  | Wheezing.            |           |         |          |           |
| Asthma, mild         |                      |           |         |          |           |
| intermittent,        |                      |           |         |          |           |
| uncomplicated        |                      |           |         |          |           |
+----------------------+----------------------+-----------+---------+----------+-----------+
|   albuterol 2.5 mg/3 | Use one vial in your |   60 vial | 0       | 20 | 10/08/201 |
|  mL nebulizer        |  nebulizer every 4   |           |         | 15       | 6         |
| solutionIndications: | hours as needed for  |           |         |          |           |
|  Asthma, mild        | wheezing             |           |         |          |           |
| intermittent,        |                      |           |         |          |           |
| uncomplicated        |                      |           |         |          |           |
+----------------------+----------------------+-----------+---------+----------+-----------+
|   Cyclobenzaprine    | Take  by mouth.      |           | 0       |          |  |
| HCl (FLEXERIL PO)    |                      |           |         |          | 6         |
+----------------------+----------------------+-----------+---------+----------+-----------+
|   ibuprofen (ADVIL,  | Take 400 mg by mouth |           | 0       |          |  |
| MOTRIN) 200 mg       |  every 6 hours as    |           |         |          | 6         |
| tablet               | needed.              |           |         |          |           |
+----------------------+----------------------+-----------+---------+----------+-----------+
|   methocarbamol      | Take 1 tablet by     |   60      | 0       | 20 |  |
| (ROBAXIN) 750 mg     | mouth 4 times daily  | tablet    |         | 16       | 6         |
| tabletIndications:   | for 15 days.         |           |         |          |           |
| Low back pain,       |                      |           |         |          |           |
| unspecified back     |                      |           |         |          |           |
| pain laterality,     |                      |           |         |          |           |
| unspecified          |                      |           |         |          |           |
| chronicity, with     |                      |           |         |          |           |
| sciatica presence    |                      |           |         |          |           |
| unspecified          |                      |           |         |          |           |
+----------------------+----------------------+-----------+---------+----------+-----------+
|                      | Take  by mouth.      |           | 0       |          | 10/08/201 |
| [id number]-DWC-WL-APA |                      |           |         |          | 6         |
| P (DIMETAPP          |                      |           |         |          |           |
| MULTISYMPTOM         |                      |           |         |          |           |
| COLD/FLU PO)         |                      |           |         |          |           |
+----------------------+----------------------+-----------+---------+----------+-----------+
|   traMADol (ULTRAM)  | One tablet orally    |   30      | 0       | 20 | 10/08/201 |
| 50 mg                | every 4 hours as     | tablet    |         | 16       | 6         |
| tabletIndications:   | needed for pain      |           |         |          |           |
| Low back pain,       |                      |           |         |          |           |
| unspecified back     |                      |           |         |          |           |
| pain laterality,     |                      |           |         |          |           |
| unspecified          |                      |           |         |          |           |
| chronicity, with     |                      |           |         |          |           |
| sciatica presence    |                      |           |         |          |           |
| unspecified          |                      |           |         |          |           |
+----------------------+----------------------+-----------+---------+----------+-----------+
 documented as of this encounter
 
 
 Plan of Treatment
 Not on filedocumented as of this encounter
 
 Procedures
 
 
+----------------------+--------+-------------+----------------------+----------------------
+
| Procedure Name       | Priori | Date/Time   | Associated Diagnosis | Comments             
|
|                      | ty     |             |                      |                      
|
+----------------------+--------+-------------+----------------------+----------------------
+
| XR LUMBAR SPINE 2 OR | Routin | 2016  |   Low back pain,     |   Results for this   
|
|  3 VW                | e      |  3:11 PM    | unspecified back     | procedure are in the 
|
|                      |        | PDT         | pain laterality,     |  results section.    
|
|                      |        |             | unspecified          |                      
|
|                      |        |             | chronicity, with     |                      
|
|                      |        |             | sciatica presence    |                      
|
|                      |        |             | unspecified  Right   |                      
|
|                      |        |             | flank pain           |                      
|
+----------------------+--------+-------------+----------------------+----------------------
+
 documented in this encounter
 
 Results
 XR Lumbar Spine 2 or 3 Vw (2016  3:11 PM PDT)
 
+----------+
| Specimen |
+----------+
|          |
+----------+
 
 
 
+------------------------------------------------------------------------+-----------------+
| Narrative                                                              | Performed At    |
+------------------------------------------------------------------------+-----------------+
|   XR LUMBAR SPINE 2 OR 3 VW 2016 3:11 PM     HISTORY: pain.       |   [facility]    |
| COMPARISON: None.     FINDINGS:  There are no acute osseous findings.  | [facility]        |
| Vertebral body height are preserved with no  evidence for compression  | [facility]  |
| fractures. Disc height are maintained. Facet joints are  intact.       | - [facility]AG[facility]       |
| Visualized ribs and pelvic osseous structures show no acute findings.  |                 |
|  Soft tissue structures are unremarkable.     IMPRESSION -  No acute   |                 |
| findings.     Dictated and Signed by: Bryson Nicholas MD                 |                 |
| Electronically signed: 2016 4:46 PM                               |                 |
+------------------------------------------------------------------------+-----------------+
 
 
 
 
+------------------------------------------------------------------------------------------+
| Procedure Note                                                                           |
+------------------------------------------------------------------------------------------+
|   [facility], Rad Results In - 2016  4:49 PM PDT  XR LUMBAR SPINE 2 OR 3 VW 2016     |
| 3:11 PMHISTORY: pain.COMPARISON: None.FINDINGS:There are no acute osseous findings.      |
| Vertebral body height are preserved with noevidence for compression fractures. Disc      |
| height are maintained. Facet joints areintact. Visualized ribs and pelvic osseous        |
| structures show no acute findings.Soft tissue structures are unremarkable.IMPRESSION -No |
|  acute findings.Dictated and Signed by: Bryson Nicholas MD  Electronically signed:          |
| 2016 4:46 PM                                                                        |
|There are no acute osseous findings. Vertebral body height are preserved with no          |
|evidence for compression fractures. Disc height are maintained. Facet joints are          |
|intact. Visualized ribs and pelvic osseous structures show no acute findings.            |
|Soft tissue structures are unremarkable.                                                  |
|                                                                                          |
|IMPRESSION -                                                                              |
|No acute findings.                                                                        |
|                                                                                          |
|Dictated and Signed by: Bryson Nicholas MD                                                   |
| Electronically signed: 2016 4:46 PM                                                 |
+------------------------------------------------------------------------------------------+
 
 
 
+----------------------+---------------------+--------------------+----------------+
| Performing           | Address             | City/State/Zipcode | Phone Number   |
| Organization         |                     |                    |                |
+----------------------+---------------------+--------------------+----------------+
|   [facility]     |   [address] |   [place]  |   [phone] |
| [facility]  |                     | [id number]              |                |
| - IMAGING            |                     |                    |                |
+----------------------+---------------------+--------------------+----------------+
 documented in this encounter
 
 Visit Diagnoses
 Not on filedocumented in this encounter

## 2020-09-28 NOTE — XMS
Encounter Summary
  Created on: 2020
 
 Marie Fermin
 External Reference #: [id number]
 : [birth year]
 Sex: Female
 
 Demographics
 
 
+-----------------------+------------------------+
| Address               | [address]          |
|                       | [place]  [address]      |
+-----------------------+------------------------+
| Home Phone            | [phone]        |
+-----------------------+------------------------+
| Preferred Language    | Unknown                |
+-----------------------+------------------------+
| Marital Status        |                 |
+-----------------------+------------------------+
| Congregation Affiliation | Unknown                |
+-----------------------+------------------------+
| Race                  | White                  |
+-----------------------+------------------------+
| Ethnic Group          | Not  or  |
+-----------------------+------------------------+
 
 
 Author
 
 
+--------------+--------------------------------------------+
| Author       | [facility]  |
|              | and [place]                                |
+--------------+--------------------------------------------+
| Organization | [facility]  |
|              | and [place]                                |
+--------------+--------------------------------------------+
| Address      | Unknown                                    |
+--------------+--------------------------------------------+
| Phone        | Unavailable                                |
+--------------+--------------------------------------------+
 
 
 
 Support
 
 
+-----------------+--------------+---------+-----------------+
| Name            | Relationship | Address | Phone           |
+-----------------+--------------+---------+-----------------+
| Rodríguez Fermin | ECON         | Unknown | [phone] |
+-----------------+--------------+---------+-----------------+
 
 
 
 Care Team Providers
 
 
 
+-----------------------+------+-----------------+
| Care Team Member Name | Role | Phone           |
+-----------------------+------+-----------------+
| ROLDAN Horta NP   | PCP  | [phone] |
+-----------------------+------+-----------------+
 
 
 
 Reason for Referral
 Evaluate & Treat (Routine)
 
+--------+--------------+-----------+--------------+--------------+---------------+
| Status | Reason       | Specialty | Diagnoses /  | Referred By  | Referred To   |
|        |              |           | Procedures   | Contact      | Contact       |
+--------+--------------+-----------+--------------+--------------+---------------+
| Closed |   Specialty  | Physical  |   Diagnoses  |   [facility],   |   [facility] |
|        | Services     | Therapy   |  Spondylosis | Alejandra        |  [facility]  [address] |
|        | Required     |           |  of lumbar   | Gwendolyn, PA-C |  [address]       |
|        |              |           | region       |   [address]      | [facility],  |
|        |              |           | without      | [place]       | [place] [address] |
|        |              |           | myelopathy   | [address]       |   Phone:      |
|        |              |           | or           | [address]     | [phone]  |
|        |              |           | radiculopath | [facility], |  Fax:         |
|        |              |           | y            |  [place] [address]    | [phone]  |
|        |              |           |              | Phone:       |               |
|        |              |           |              | [phone] |               |
|        |              |           |              |   Fax:       |               |
|        |              |           |              | [phone] |               |
+--------+--------------+-----------+--------------+--------------+---------------+
 
 
 
 
 Reason for Visit
 
 
+-------------+----------+
| Reason      | Comments |
+-------------+----------+
| New Patient |          |
+-------------+----------+
| Back Pain   |          |
+-------------+----------+
 Evaluate & Treat (Routine)
 
+------------+--------+---------------+--------------+--------------+---------------+
| Status     | Reason | Specialty     | Diagnoses /  | Referred By  | Referred To   |
|            |        |               | Procedures   | Contact      | Contact       |
+------------+--------+---------------+--------------+--------------+---------------+
| Authorized |        | Physical      |   Diagnoses  |   Mychal, W  |   Marie, |
|            |        | Medicine and  |  Low back    | MALKA Benites   |  Rony KAPLAN MD   |
|            |        | Rehabilitatio | pain         | [address]  | [address]  |
|            |        | n             |              | [address]  [place]    | [address]  [place]     |
|            |        |               |              | [place]    | [place]     |
|            |        |               |              | [address]   | [address]  Phone: |
|            |        |               |              | Phone:       |  [phone] |
|            |        |               |              | [phone] |   Fax:        |
|            |        |               |              |   Fax:       | [phone]  |
 
|            |        |               |              | [phone] |               |
+------------+--------+---------------+--------------+--------------+---------------+
 
 
 
 
 Encounter Details
 
 
+--------+---------+---------------------+----------------------+---------------------+
| Date   | Type    | Department          | Care Team            | Description         |
+--------+---------+---------------------+----------------------+---------------------+
| 10/22/ | Office  |   [facility]         |   Alejandra Jackson     | Spondylosis of      |
| 2019   | Visit   | PHYSIATRY  [address]    | [address]  | lumbar region       |
|        |         | [place] [address]   | [place]  [address] | without myelopathy  |
|        |         | [place]     |  [address]  [place] | or radiculopathy    |
|        |         | [address]          |  [address]  [phone] | (Primary Dx)        |
|        |         | [phone]        |   [phone] (Fax) |                     |
+--------+---------+---------------------+----------------------+---------------------+
 
 
 
 Social History
 
 
+--------------------+-------+-----------+--------+------+
| Tobacco Use        | Types | Packs/Day | Years  | Date |
|                    |       |           | Used   |      |
+--------------------+-------+-----------+--------+------+
| Current Every Day  |       | 1         |        |      |
| Smoker             |       |           |        |      |
+--------------------+-------+-----------+--------+------+
 
 
 
+---------------------+---+---+---+
| Smokeless Tobacco:  |   |   |   |
| Never Used          |   |   |   |
+---------------------+---+---+---+
 
 
 
+-------------+-------------+---------+----------+
| Alcohol Use | Drinks/Week | oz/Week | Comments |
+-------------+-------------+---------+----------+
| Yes         |             |         | Rare     |
+-------------+-------------+---------+----------+
 
 
 
+------------------+---------------+
| Sex Assigned at  | Date Recorded |
| Birth            |               |
+------------------+---------------+
| Not on file      |               |
+------------------+---------------+
 documented as of this encounter
 
 Last Filed Vital Signs
 
 
 
+-------------------+-------------------+----------------------+----------+
| Vital Sign        | Reading           | Time Taken           | Comments |
+-------------------+-------------------+----------------------+----------+
| Blood Pressure    | 116/64            | 10/22/2019  3:25 PM  |          |
|                   |                   | PDT                  |          |
+-------------------+-------------------+----------------------+----------+
| Pulse             | -                 | -                    |          |
+-------------------+-------------------+----------------------+----------+
| Temperature       | -                 | -                    |          |
+-------------------+-------------------+----------------------+----------+
| Respiratory Rate  | 14                | 10/22/2019  3:25 PM  |          |
|                   |                   | PDT                  |          |
+-------------------+-------------------+----------------------+----------+
| Oxygen Saturation | -                 | -                    |          |
+-------------------+-------------------+----------------------+----------+
| Inhaled Oxygen    | -                 | -                    |          |
| Concentration     |                   |                      |          |
+-------------------+-------------------+----------------------+----------+
| Weight            | 105.6 kg (232 lb  | 10/22/2019  3:25 PM  |          |
|                   | 12.9 oz)          | PDT                  |          |
+-------------------+-------------------+----------------------+----------+
| Height            | 165.1 cm (5' 5")  | 10/22/2019  3:25 PM  |          |
|                   |                   | PDT                  |          |
+-------------------+-------------------+----------------------+----------+
| Body Mass Index   | 38.74             | 10/22/2019  3:25 PM  |          |
|                   |                   | PDT                  |          |
+-------------------+-------------------+----------------------+----------+
 documented in this encounter
 
 Patient Instructions
 Patient Instructions Alejandra Jackson PA-C - 10/22/2019  3:40 PM PDT1.  A referral ha
s been placed for physical therapy at [facility] for your low back pain.  If this do
es not relieve your pain or if your pain progresses please call the office and we will sched
ule L4-5 and L5-S1 facet injections.
 
 Facet Joint Injection
 Back or neck pain may be caused by a problem with your facet joints. If so, a facet joint i
njection may help. With this treatment, medicine is injected into certain facet joints. The 
injection can help your doctor find problem joints. It may also relieve your pain.
 
 What is a facet joint?
 Bones called vertebrae make up your spine. Each vertebra has facets (flat surfaces) that to
uch where the vertebrae fit together. These form a structure called a facet joint on each si
de of the vertebrae.
 What is a facet joint injection?
 One or more facet joints in your back or neck can become inflamed (swollen and irritated). 
This may cause pain. During a facet joint injection, medicine is injected into the inflamed 
joints. This treatment may help reduce inflammation and relieve pain. Pain reliefmay last 
for weeks to months or longer. If the pain returns, you may need a repeat injection.
 
 Getting ready
 To get ready for your treatment, do the following:
  At least a week before treatment, tell your healthcare provider what medicines you take.
 This includes aspirin. Ask whether you should stop taking any of them before treatment.
  Tell your provider if you are pregnant or allergic to any medicines.
  Follow any directions you are given for not eating or drinking before the treatment.
  If asked, bring X-rays, MRIs, or other tests with you on the day of your treatment.
 Date Last Reviewed: 2018-2018 [employer]. [address] [place] [address]. All [facility]
 
ts reserved. This information is not intended as a substitute for professional medical care.
 Always follow your healthcare professional's instructions.
 
 Electronically signed by Alejandra Jackson PA-C at 10/22/2019  3:57 PM PDT
 documented in this encounter
 
 Progress Notes
 Alejandra Jackson PA-C - 10/22/2019  3:40 PM PDTFormatting of this note might be diffe
rent from the original.
 CAESAR Jackson PA-C
 [address] [place] [address], [address]
 [place] [address]
 PHONE:  [phone]
 FAX:  [phone]  
 
 PHYSIATRY HISTORY AND PHYSICAL EXAMINATION
 
 CHIEF COMPLAINT: 
 Chief Complaint 
 Patient presents with 
   New Patient 
   Back Pain 
 
 HISTORY OF PRESENT ILLNESS:  The patient is a 44 y.o. female with the complaint of low back
 pain that has been occurring on and off for greater than 5 years.  The patient describes sy
mptom onset following no particular incident.  The symptoms have been gradually worsening.  
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
 as fast as she used to.
 
 She rates the pain as moderate and rated a 4/10.  The symptoms are intermittent.  She descr
ibes the pain as aching, sharp, shooting and throbbing.  
 
 The patient denies any lower extremity pain, weakness or sensation changes.  The patient al
so describes a right knee injury with prior surgery that she is currently in physical therap
y for.
 
 The patient does not report any change in bowel or bladder function recently.  
 
 Her symptoms improve with changing position.
 
 Her symptoms worsen with standing, sitting, walking, running, kneeling, bending and twistin
g.
 
 She has tried Chiropactic and NSAIDS.  She is also tried narcotics and muscle relaxers.  
 
 .
 
 PAST MEDICAL HISTORY:
 Past Medical History: 
 Diagnosis Date 
   Asthma  
   Back pain  
   Bronchitis  
   Fibroid  
   Hypercholesterolemia  
   Pelvic pain  
   Right knee pain  
   Uterine leiomyoma  
 
 PAST SURGICAL HISTORY:
 
 Past Surgical History: 
 Procedure Laterality Date 
   CYSTOSCOPY   
   DILATION AND CURETTAGE OF UTERUS   
   HYSTERECTOMY   
   KNEE SURGERY   
   OTHER SURGICAL HISTORY Right  
  skin lesion excision 
   SALPINGO-OOPHORECTOMY   
   TUBAL LIGATION  2003 
 
 CURRENT MEDICATIONS:
 Current Outpatient Medications 
 Medication Sig Dispense Refill 
   fenofibrate 160 mg tablet Take 160 mg by mouth Daily.   
   ibuprofen (ADVIL, MOTRIN) 200 mg tablet Take 400 mg by mouth every 6 hours as needed fo
r Pain.   
 
 No current facility-administered medications for this visit.  
 
 ALLERGIES:
 Allergies 
 Allergen Reactions 
   Aspirin Other (See Comments) 
   Patient reports history of bleeding ulcer at age 5 
   Food Swelling 
   Mangos cause swelling
 Walnuts causeTongue swelling 
 Mangos cause swelling 
   Peanuts Swelling 
   Walnuts causeTongue swelling  
 
 SOCIAL HISTORY:
 The patient  reports that she has been smoking.  She has been smoking about 1.00 pack per d
ay. She has never used smokeless tobacco. She reports that she drinks alcohol. She reports t
hat she has current or past drug history.
 
 FAMILY HISTORY:
 Family History 
 Problem Relation Age of Onset 
   Other (see comment) Mother  
      CVA 
 
 REVIEW OF SYSTEMS:
 GENERALLY:   No fever, no night sweats, no anemia, no fatigue, no recent profound weight ch
anges.  
 EYES:  No eye problems, no impaired sight, no use of corrective lenses, no eye injury, no d
ouble vision, no transient blindness.
 EARS, NOSE, AND THROAT:  No changes in taste or smell, no hearing difficulty, no ringing in
 the ears, no ear drainage, no ear injury, no dizziness, no voice changes, no difficulty swa
llowing, no significant snoring, no sleep apnea/CPAP, no sinus problems, no major dental wor
k.
 NEUROLOGICALLY:  Please see the review of systems discussed above in the history of present
 illness. 
 PSYCHIATRIC:  + depression, no difficulty sleeping, + anxiety, no bipolar disorder.
 CARDIOVASCULAR:  No heart attacks, no heart murmur, no heart fluttering, no chest pain, no 
ankle swelling.  
 LUNG DISEASE:  No shortness of breath, no cough, no tuberculosis, no bloody cough, + asthma
, no emphysema/COPD.
 GASTROINTESTINAL:  No bowel disease, no nausea or vomiting, no rectal bleeding, no constipa
 
tion, no fecal stool incontinence, no liver/gallbladder disease, no abdominal pain, + ulcers
.
 KIDNEY DISEASE:  No urinary frequency, no painful or difficult urination, no urinary incont
inence, no bladder problems, no impotence.
 ENDOCRINE:  No diabetes, no thyroid disease, no osteopenia or osteoporosis, no breast drain
age.  
 SKIN:  No breast lumps, no skin disease or skin changes, no rashes/itches.
 HEMATOLOGIC/LYMPHATIC:  No enlarged lymph nodes, no easy or unusual bleeding, no personal h
istory of cancer.
 RHEUMATOLOGIC:  No joint pain/arthritis, no rheumatoid arthritis.
 
 PHYSICAL EXAMINATION:
 Blood pressure 116/64, resp. rate 14, height 1.651 m (5' 5"), weight 105.6 kg (232 lb 12.9 
oz), last menstrual period 2016, not currently breastfeeding. Body mass index is 38.74
 kg/m.
 
 GENERAL: Marie Fermin is in no acute distress with unlabored respirations.  She does n
ot appear uncomfortable throughout the exam today.   
 HEENT:   Head: Normocephalic/atraumatic with no areas of recent trauma.  
 Eyes: Normal sclerae without icterus.  
 Ears: No drainage or tenderness.  
 Nasopharynx: Clear without drainage.
 Oropharynx: Clear without erythema.   
 NECK (ANTERIOR): Supple and without palpable masses. 
 CHEST:  Unlabored respirations 
 HEART:  No lower extremity edema 
 ABDOMEN: Soft, non-tender, non-distended, and without palpable masses.  The patient is obes
e.   
 NEUROLOGICAL: The patient is awake, alert, and oriented to time, place, person.  
 She follows simple and complex commands.
 Her speech is fluent. She comprehends speech well.  
 She has no apparent deficits with short or long term memory.
 Cranial nerves 2-12 appear grossly intact.
 
 Sensory exam  does not show diminished sensation to light touch in the bilateral lower extr
emities. 
 EXTREMITIES: No cyanosis, clubbing, or edema.  Distal pulses are palpable.   
 
 PHYSICAL EXAM:
 
 MENTAL STATUS: 
 She is awake, alert, and oriented.  
 She follows simple and complex commands.
 Her speech is fluent, she comprehends speech well, and she repeats well.  
 She has no apparent deficits with short or long term memory. 
 CRANIAL NERVES: 
 II: Acuity is intact.  Fields are full to confrontation. 
 III, IV, VI: The pupils are reactive.  Extraocular movements are intact.  No ptosis is note
d. 
 V: Facial sensation is intact and symmetric. 
 VII: Facial movements are symmetric. 
 VIII: Hearing is intact bilaterally. 
 IX, X: The uvula and palate move appropriately.   
 XI: Shrug is equal bilaterally. 
 XII: Tongue protrusion is midline. 
  
 MOTOR EXAM:
 
 (5 IS NORMAL)
 
 
 * Indicates pain limited 
 MUSCLE/  MOVEMENT: 
 RIGHT  
 LEFT 
 Hip Flexion 5 5 
 Hip Extension 5 5 
 Knee Flexion 5 5 
 Knee Extension 5 5 
 Dorsiflexion 5 5 
 Extensor Hallicus Longus 5 5 
 Plantarflexion 5 5 
  
 REFLEXES:
 
 (2 OR 2+ IS NORMAL) 
 REFLEX: 
 RIGHT 
 LEFT 
 PATELLAR 2 2 
 ACHILLES 2 2 
  
 GAIT: 
 Gait is steady.  Patient is able to demonstrate tiptoe and heel walk with ease.  Lumbar fle
xion and extension demonstrated with pain complaints in lumbar extension.  Facet loading pos
itive at L4-5 and L5-S1 bilateral patient denies any tenderness to palpation of her bilatera
l SI joint or trochanteric bursa region 
 PERIPHERAL NERVE/MISC: 
 Straight leg raise is negative bilaterally.
 Yash's test of the hips is negative bilaterally. 
 
 TEST AND RADIOGRAPHIC REVIEW:
 Her imaging was reviewed in detail today during the visit.  The MRI from 2016 shows L5
-S1 right eccentric disc protrusion with mild right neuroforaminal narrowing.  Patient has s
mall facet joint effusions at L3-4, L4-5 with facet hypertrophy at L4-5 and L5-S1.
 
 Lumbar x-rays from 10/7/2019 shows no major instability.  
 
 ASSESSMENT:
 
 NEUROSURGICAL DIAGNOSES:
 Encounter Diagnosis 
 Name Primary? 
   Spondylosis of lumbar region without myelopathy or radiculopathy Yes 
 
 GENERAL DIAGNOSES:
 Past Medical History: 
 Diagnosis Date 
   Asthma  
   Back pain  
   Bronchitis  
   Fibroid  
   Hypercholesterolemia  
   Pelvic pain  
   Right knee pain  
   Uterine leiomyoma  
 
 PLAN:
 
 Marie Fermin presented today, and it was a pleasure seeing this patient and assessing 
her problems. 
 
 
 1) Today we discussed the patient's differential diagnosis with the likely primary issue be
ing LUMBAR spondylosis.  Patient's description of symptoms, physical exam, and imaging sugge
st this diagnosis at this time. 
 
 2) I counseled patient on treatment options which included conservative self management usi
ng OTC NSAIDs/Ice and heat packs, physical therapy, prescription medications, epidural stero
id injection, neuromodulation therapies, as well as possible surgical intervention. 
 
 3) Imaging: As descibed above in radiology review. 
 
 4) The patient has had significant conservative care including medications (NSAIDS and narc
otics), PT (multiple sessions over the years) and chiropractic care. Unfortunately Marie Fermin continues to have significant discomfort.  It appears to me that the pain is primar
dwight coming from L4-5 and L5-S1 bilateral facet joints.  
 
  I did feel that Marie Fermin would be a good candidate for interventional procedure
s and I offered a L4-5 and L5-S1 facet joint injections to be done if physical therapy is no
t helpful.  
 
 5) Patient will follow up with me after physical therapy for symptoms persist.
 
 6) If current treatment plan is insufficient for symptom relief we could try L4-5 and 5 S1 
facet joint injections as the next therapy option. 
 
 I spent 30 minutes in visit with Marie Fermin today with the majority of time spent co
unselling the patient on her diagnosis, options for her care, and coordinating her care.  
 
 10/22/19
 
 ELECTRONICALLY SIGNED BY:  CAESAR Jackson PA-C, 10/22/2019 15:57
 
 Electronically signed by Alejandra Jackson PA-C at 10/22/2019  4:08 PM PDTdocumented in
 this encounter
 
 Plan of Treatment
 
 
+----------------------+-------------+--------+----------------------+---------------------+
| Name                 | Type        | Priori | Associated Diagnoses | Order Schedule      |
|                      |             | ty     |                      |                     |
+----------------------+-------------+--------+----------------------+---------------------+
| AMB REFERRAL TO [facility] | Outpatient  | Routin |   Spondylosis of     | Ordered: 10/22/2019 |
|  PHYSICAL THERAPY    | Referral    | e      | lumbar region        |                     |
|                      |             |        | without myelopathy   |                     |
|                      |             |        | or radiculopathy     |                     |
+----------------------+-------------+--------+----------------------+---------------------+
 documented as of this encounter
 
 Visit Diagnoses
 
 
+--------------------------------------------------------------------------------+
| Diagnosis                                                                      |
+--------------------------------------------------------------------------------+
|   Spondylosis of lumbar region without myelopathy or radiculopathy - Primary   |
| Lumbosacral spondylosis without myelopathy                                     |
+--------------------------------------------------------------------------------+
 documented in this encounter

## 2020-09-28 NOTE — XMS
Encounter Summary
  Created on: 2020
 
 Marie Fermin
 External Reference #: [id number]
 : [birth year]
 Sex: Female
 
 Demographics
 
 
+-----------------------+------------------------+
| Address               | [address]          |
|                       | [place]  [address]      |
+-----------------------+------------------------+
| Home Phone            | [phone]        |
+-----------------------+------------------------+
| Preferred Language    | Unknown                |
+-----------------------+------------------------+
| Marital Status        |                 |
+-----------------------+------------------------+
| Restorationism Affiliation | Unknown                |
+-----------------------+------------------------+
| Race                  | White                  |
+-----------------------+------------------------+
| Ethnic Group          | Not  or  |
+-----------------------+------------------------+
 
 
 Author
 
 
+--------------+--------------------------------------------+
| Author       | [facility]  |
|              | [facility] [place]                                |
+--------------+--------------------------------------------+
| Organization | [facility]  |
|              | [facility] [place]                                |
+--------------+--------------------------------------------+
| Address      | Unknown                                    |
+--------------+--------------------------------------------+
| Phone        | Unavailable                                |
+--------------+--------------------------------------------+
 
 
 
 Support
 
 
+-----------------+--------------+---------+-----------------+
| Name            | Relationship | Address | Phone           |
+-----------------+--------------+---------+-----------------+
| Rodríguez Fermin | ECON         | Unknown | [phone] |
+-----------------+--------------+---------+-----------------+
 
 
 
 Care Team Providers
 
 
 
+-----------------------+------+-------------+
| Care Team Member Name | Role | Phone       |
+-----------------------+------+-------------+
| No, Physician         | PCP  | Unavailable |
+-----------------------+------+-------------+
 
 
 
 Encounter Details
 
 
+--------+-----------+----------------------+----------------------+-------------+
| Date   | Type      | Department           | Care Team            | Description |
+--------+-----------+----------------------+----------------------+-------------+
| / | Hospital  |   [facility] |   Segundo Botello    |             |
|    | Encounter |  [facility]  | Godwin Samayoa MD      |             |
|        |           |  [address]  [place] | [address]       |             |
|        |           |  [place]           | [place]      |             |
|        |           | [address]           | [address]  [phone]  |             |
|        |           | [phone]         |  [phone] (Fax)  |             |
+--------+-----------+----------------------+----------------------+-------------+
 
 
 
 Social History
 
 
+--------------------+-------+-----------+--------+------+
| Tobacco Use        | Types | Packs/Day | Years  | Date |
|                    |       |           | Used   |      |
+--------------------+-------+-----------+--------+------+
| Current Every Day  |       | 1         |        |      |
| Smoker             |       |           |        |      |
+--------------------+-------+-----------+--------+------+
 
 
 
+---------------------+---+---+---+
| Smokeless Tobacco:  |   |   |   |
| Never Used          |   |   |   |
+---------------------+---+---+---+
 
 
 
+-------------+-------------+---------+----------+
| Alcohol Use | Drinks/Week | oz/Week | Comments |
+-------------+-------------+---------+----------+
| Not Asked   |             |         |          |
+-------------+-------------+---------+----------+
 
 
 
+------------------+---------------+
| Sex Assigned at  | Date Recorded |
| Birth            |               |
+------------------+---------------+
| Not on file      |               |
+------------------+---------------+
 
 documented as of this encounter
 
 Medications at Time of Discharge
 
 
+----------------------+----------------------+-----------+---------+----------+-----------+
| Medication           | Sig                  | Dispensed | Refills | Start    | End Date  |
|                      |                      |           |         | Date     |           |
+----------------------+----------------------+-----------+---------+----------+-----------+
|   albuterol          | Inhale 2 puffs into  |   1       | 3       | 20 |  |
| (VENTOLIN HFA) 90    | the lungs every 4    | Inhaler   |         | 15       | 6         |
| mcg/puff             | hours as needed for  |           |         |          |           |
| inhalerIndications:  | Wheezing.            |           |         |          |           |
| Asthma, mild         |                      |           |         |          |           |
| intermittent,        |                      |           |         |          |           |
| uncomplicated        |                      |           |         |          |           |
+----------------------+----------------------+-----------+---------+----------+-----------+
|   albuterol 2.5 mg/3 | Use one vial in your |   60 vial | 0       | 20 | 10/08/201 |
|  mL nebulizer        |  nebulizer every 4   |           |         | 15       | 6         |
| solutionIndications: | hours as needed for  |           |         |          |           |
|  Asthma, mild        | wheezing             |           |         |          |           |
| intermittent,        |                      |           |         |          |           |
| uncomplicated        |                      |           |         |          |           |
+----------------------+----------------------+-----------+---------+----------+-----------+
|                      | Take 1 tablet by     |   20      | 0       | 20 |  |
| amoxicillin-clavulan | mouth 2 times daily  | tablet    |         | 15       | 5         |
| ate (AUGMENTIN)      | for 10 days.         |           |         |          |           |
| 875-125 mg per       |                      |           |         |          |           |
| tabletIndications:   |                      |           |         |          |           |
| RML pneumonia        |                      |           |         |          |           |
+----------------------+----------------------+-----------+---------+----------+-----------+
|   azithromycin       | Take 2 tablets       |   6       | 0       | 20 |  |
| (ZITHROMAX) 250 mg   | orally on the first  | tablet    |         | 15       | 5         |
| tablet (ED           | day then take one    |           |         |          |           |
| prepack)Indications: | tablet a day for 4   |           |         |          |           |
|  Bronchitis,         | days                 |           |         |          |           |
| Pharyngitis          |                      |           |         |          |           |
+----------------------+----------------------+-----------+---------+----------+-----------+
|   Cyclobenzaprine    | Take  by mouth.      |           | 0       |          |  |
| HCl (FLEXERIL PO)    |                      |           |         |          | 6         |
+----------------------+----------------------+-----------+---------+----------+-----------+
|   ibuprofen (ADVIL,  | Take 400 mg by mouth |           | 0       |          |  |
| MOTRIN) 200 mg       |  every 6 hours as    |           |         |          | 6         |
| tablet               | needed.              |           |         |          |           |
+----------------------+----------------------+-----------+---------+----------+-----------+
|                      | Take  by mouth.      |           | 0       |          | 10/08/201 |
| Ymipowsvc-JLG-IY-APA |                      |           |         |          | 6         |
| P (DIMETAPP          |                      |           |         |          |           |
| MULTISYMPTOM         |                      |           |         |          |           |
| COLD/FLU PO)         |                      |           |         |          |           |
+----------------------+----------------------+-----------+---------+----------+-----------+
|   predniSONE         | Take 1 tablet by     |   5       | 0       | 20 |  |
| (DELTASONE) 20 mg    | mouth Daily for 5    | tablet    |         | 15       | 5         |
| tabletIndications:   | days.                |           |         |          |           |
| Asthma, mild         |                      |           |         |          |           |
| intermittent,        |                      |           |         |          |           |
| uncomplicated        |                      |           |         |          |           |
+----------------------+----------------------+-----------+---------+----------+-----------+
 documented as of this encounter
 
 
 Plan of Treatment
 Not on filedocumented as of this encounter
 
 Procedures
 
 
+------------------+--------+-------------+----------------------+----------------------+
| Procedure Name   | Priori | Date/Time   | Associated Diagnosis | Comments             |
|                  | ty     |             |                      |                      |
+------------------+--------+-------------+----------------------+----------------------+
| XR CHEST PA AND  | Routin | 2015  |   Cough              |   Results for this   |
| LATERAL          | e      |  2:39 PM    |                      | procedure are in the |
|                  |        | PST         |                      |  results section.    |
+------------------+--------+-------------+----------------------+----------------------+
 documented in this encounter
 
 Results
 XR Chest PA and Lateral (2015  2:39 PM PST)
 
+----------+
| Specimen |
+----------+
|          |
+----------+
 
 
 
+------------------------------------------------------------------------+-----------------+
| Narrative                                                              | Performed At    |
+------------------------------------------------------------------------+-----------------+
|   XR CHEST PA AND LATERAL 2015 2:01 PM     HISTORY: cough.         |   [facility]    |
| COMPARISON: None.     Findings:  Heart size is within normal limits.   | [facility]        |
| Aorta is normal. Mediastinum is  unremarkable. Central pulmonary       | [facility]  |
| vasculature is normal. There is slight haziness  in the medial aspect  | - IMAGING       |
| of the right lung base, possibly in the right middle lobe,  that could |                 |
|  represent developing pneumonia. There are no acute osseous            |                 |
| abnormalities.     IMPRESSION -  Possible developing pneumonia in      |                 |
| right middle lobe.     Dictated and Signed by: Bryson Nicholas MD        |                 |
| Electronically signed: 2015 8:50 AM                                |                 |
+------------------------------------------------------------------------+-----------------+
 
 
 
+------------------------------------------------------------------------------------------+
| Procedure Note                                                                           |
+------------------------------------------------------------------------------------------+
|   [facility], Rad Results In - 2015  8:54 AM PST  XR CHEST PA AND LATERAL 2015 2:01   |
| PMHISTORY: cough.COMPARISON: None.Findings:Heart size is within normal limits. Aorta is  |
| normal. Mediastinum isunremarkable. Central pulmonary vasculature is normal. There is    |
| slight hazinessin the medial aspect of the right lung base, possibly in the right middle |
|  lobe,that could represent developing pneumonia. There are no acute                      |
| osseousabnormalities.IMPRESSION -Possible developing pneumonia in right middle           |
| lobe.Dictated and Signed by: Bryson Nicholas MD  Electronically signed: 2015 8:50 AM    |
|Heart size is within normal limits. Aorta is normal. Mediastinum is                       |
|unremarkable. Central pulmonary vasculature is normal. There is slight haziness          |
|in the medial aspect of the right lung base, possibly in the right middle lobe,           |
|that could represent developing pneumonia. There are no acute osseous                     |
|abnormalities.                                                                           |
|                                                                                          |
|IMPRESSION -                                                                              |
 
|Possible developing pneumonia in right middle lobe.                                       |
|                                                                                          |
|Dictated and Signed by: Bryson Nicholas MD                                                   |
| Electronically signed: 2015 8:50 AM                                                  |
+------------------------------------------------------------------------------------------+
 
 
 
+----------------------+---------------------+--------------------+----------------+
| Performing           | Address             | [facility] | Phone Number   |
| Organization         |                     |                    |                |
+----------------------+---------------------+--------------------+----------------+
|   [facility]     |   [address] |   [place]  |   [phone] |
| [facility]  |                     | [id number]              |                |
| - IMAGING            |                     |                    |                |
+----------------------+---------------------+--------------------+----------------+
 documented in this encounter
 
 Visit Diagnoses
 Not on filedocumented in this encounter

## 2020-09-28 NOTE — XMS
Encounter Summary
  Created on: 2020
 
 Marie Fermin
 External Reference #: [id number]
 : [birth year]
 Sex: Female
 
 Demographics
 
 
+-----------------------+------------------------+
| Address               | [address]          |
|                       | [place]  [address]      |
+-----------------------+------------------------+
| Home Phone            | [phone]        |
+-----------------------+------------------------+
| Preferred Language    | Unknown                |
+-----------------------+------------------------+
| Marital Status        |                 |
+-----------------------+------------------------+
| Restorationism Affiliation | Unknown                |
+-----------------------+------------------------+
| Race                  | White                  |
+-----------------------+------------------------+
| Ethnic Group          | Not  or  |
+-----------------------+------------------------+
 
 
 Author
 
 
+--------------+--------------------------------------------+
| Author       | [facility]  |
|              | [facility] [place]                                |
+--------------+--------------------------------------------+
| Organization | [facility]  |
|              | [facility] [place]                                |
+--------------+--------------------------------------------+
| Address      | Unknown                                    |
+--------------+--------------------------------------------+
| Phone        | Unavailable                                |
+--------------+--------------------------------------------+
 
 
 
 Support
 
 
+-----------------+--------------+---------+-----------------+
| Name            | Relationship | Address | Phone           |
+-----------------+--------------+---------+-----------------+
| Rodríguez Fermin | ECON         | Unknown | [phone] |
+-----------------+--------------+---------+-----------------+
 
 
 
 Care Team Providers
 
 
 
+-----------------------+------+-------------+
| Care Team Member Name | Role | Phone       |
+-----------------------+------+-------------+
| No, Physician         | PCP  | Unavailable |
+-----------------------+------+-------------+
 
 
 
 Reason for Visit
 
 
+-----------+---------------------------------------------------------------+
| Reason    | Comments                                                      |
+-----------+---------------------------------------------------------------+
| Mass      | Rm3; pt states swollen spot on tailbone; poss pilondial cyst? |
+-----------+---------------------------------------------------------------+
| Back Pain | has been back to work x4 days, post hysterectomy 7 weeks ago  |
+-----------+---------------------------------------------------------------+
 
 
 
 Encounter Details
 
 
+--------+---------+----------------------+----------------------+----------------------+
| Date   | Type    | Department           | Care Team            | Description          |
+--------+---------+----------------------+----------------------+----------------------+
| 10/08/ | Office  |   [facility]   |   Johnathan Belle MD | Pilonidal cyst with  |
|    | Visit   | CARE  [address] |   [address]     | abscess (Primary Dx) |
|        |         |   [place]    | [place]      |                      |
|        |         | [id number]           | [address] [phone]  |                      |
|        |         | [phone]         |  [phone] (Fax)  |                      |
+--------+---------+----------------------+----------------------+----------------------+
 
 
 
 Social History
 
 
+--------------------+-------+-----------+--------+------+
| Tobacco Use        | Types | Packs/Day | Years  | Date |
|                    |       |           | Used   |      |
+--------------------+-------+-----------+--------+------+
| Current Every Day  |       | 1         |        |      |
| Smoker             |       |           |        |      |
+--------------------+-------+-----------+--------+------+
 
 
 
+---------------------+---+---+---+
| Smokeless Tobacco:  |   |   |   |
| Never Used          |   |   |   |
+---------------------+---+---+---+
 
 
 
+-------------+-------------+---------+----------+
| Alcohol Use | Drinks/Week | oz/Week | Comments |
 
+-------------+-------------+---------+----------+
| Not Asked   |             |         |          |
+-------------+-------------+---------+----------+
 
 
 
+------------------+---------------+
| Sex Assigned at  | Date Recorded |
| Birth            |               |
+------------------+---------------+
| Not on file      |               |
+------------------+---------------+
 documented as of this encounter
 
 Last Filed Vital Signs
 
 
+-------------------+---------------------+----------------------+----------+
| Vital Sign        | Reading             | Time Taken           | Comments |
+-------------------+---------------------+----------------------+----------+
| Blood Pressure    | 124/67              | 10/08/2016 12:53 PM  |          |
|                   |                     | PDT                  |          |
+-------------------+---------------------+----------------------+----------+
| Pulse             | 105                 | 10/08/2016 12:53 PM  |          |
|                   |                     | PDT                  |          |
+-------------------+---------------------+----------------------+----------+
| Temperature       | 37.1   C (98.7   F) | 10/08/2016 12:53 PM  |          |
|                   |                     | PDT                  |          |
+-------------------+---------------------+----------------------+----------+
| Respiratory Rate  | 16                  | 10/08/2016 12:53 PM  |          |
|                   |                     | PDT                  |          |
+-------------------+---------------------+----------------------+----------+
| Oxygen Saturation | 98%                 | 10/08/2016 12:53 PM  |          |
|                   |                     | PDT                  |          |
+-------------------+---------------------+----------------------+----------+
| Inhaled Oxygen    | -                   | -                    |          |
| Concentration     |                     |                      |          |
+-------------------+---------------------+----------------------+----------+
| Weight            | 104.3 kg (230 lb)   | 10/08/2016 12:53 PM  |          |
|                   |                     | PDT                  |          |
+-------------------+---------------------+----------------------+----------+
| Height            | 165.1 cm (5' 5")    | 10/08/2016 12:53 PM  |          |
|                   |                     | PDT                  |          |
+-------------------+---------------------+----------------------+----------+
| Body Mass Index   | 38.27               | 10/08/2016 12:53 PM  |          |
|                   |                     | PDT                  |          |
+-------------------+---------------------+----------------------+----------+
 documented in this encounter
 
 Patient Instructions
 Patient Instructions Johnathan Belle MD - 10/08/2016  1:30 PM PDT
 Pilonidal Cyst, Infected (Antibiotic Treatment)
 A pilonidal cyst is a swelling that starts under the skin on the sacrum near the tail bone.
 It is present at birth and may look like a small dimple. It can fill with skin oils, hair, 
and dead skin cells,and may stay small or grow larger. Because it often has an opening to 
the surface, it may become infected with normal skin bacteria. A pilonidal cyst is different
 than a hemorrhoid.
 Causes
 Thecause of pilonidal cysts has been debated since they were first recognized. It may be 
present at birth and go unnoticed. It may appear like a small dimple. Injury, rubbing, or sk
 
in irritation may also cause pilonidal cysts. It can also be caused by aningrown hair. Mos
t likely, the cause is a combination of these things. Becausesome injury or irritation can
 lead to pilonidal cysts, it can be more common in people who sit or drive a lot for work.
 Symptoms
 A pilonidal cyst may be small and painless. Ifsymptoms occur, they may include:
  Swelling
  Irritation or redness
  Pain
  Drainage
 The cyst can swell and drain on its own. The swelling and drainage can come and go.
 Treatment
 If the infection is limited, it can possibly be treated with antibiotics alone. If the infe
ction is more severe or gets worse, it will need to be drained. Often this can be donewith
 a small incision under local anesthesia, but may need surgery to treat it or prevent it fro
m returning.
 Home care
 The following guidelines will help you care for your wound at home:
  Sit in a tub filled with about 6 inches ofhot water.Keep the water hot for a total o
f 10 to 15 minutes.
  Do not squeeze the pilonidal cyst or stick a need to drain it. It may feel better at fir
st, but it will make the infection worse, or spread it.
  Cover the cyst with a pad or something to prevent it from becoming more irritated, damag
ed, and painful.
 Medications
  Take acetaminophen or ibuprofen for pain, unless you were given a different pain medicin
e to use. If you have chronic liver or kidney disease, or have ever had a stomach ulcer or g
astrointestinal bleeding, or are taking blood thinner medications, talk with your doctor bef
ore using these medicines.
  If you were given antibiotics, take them until they are all gone. To make sure the infec
tion is cured, it is important to finish the antibiotics even if the wound looks better.
  Useantibiotic cream or ointment.
 Preventing future infections
 Once this infection has healed,the followingmay decrease the risk of future infections:
  Keep the area of the cyst clean by bathing or showering daily.
  Avoid tight-fitting clothing to minimize sweat and irritation of the skin.
  Recurrent pilonidal cysts may be completely removed by surgery, but this can only be don
e at a time when there is no infection. Ask your doctor for more information.
  Watchfor signs of infection listed below so that treatment may be started early.
 Follow-up care
 Follow up with your doctor as advised by our staff. Check your wound every day for the sign
s listed below.
 When to seek medical care
 Get prompt medical attention if any of the following occur:
  Pus coming from the cyst
  Increasing local pain, redness or swelling
  Fever over 100.4F (38.0C) for more than two days
  9410-5569 [employer]. [address] [place] [address], [place] [address]. All righ
ts reserved. This information is not intended as a substitute for professional medical care.
 Always follow your healthcare professional's instructions.
 
 Pilonidal Cyst
 A pilonidal cyst is found near the tailbone or top of the buttocks crease. It may look like
 a pit or small depression. In some cases, it may have a hollow tunnel (sinus tract) that co
nnects it to the surface of the skin. Normally, a pilonidal cyst does not cause symptoms. Bu
t if it becomes infected, it can cause pain and swelling. This sheet tells you more about pi
lonidal cysts and how they are treated.
 
 What causes a pilonidal cyst and who gets them?
 Two primary causes are:
 Ingrown hairs, in which a hair is forced under the skin or when a hair follicle ruptures.
 
 Injury, such as sitting for long periods of time.
 These cysts are often diagnosed in people between the ages of 16 and 26. But people of any 
age can have a pilonidal cyst. While they affect both men and women, they are more common in
 men than in women.
 Symptoms of a pilonidal cyst infection
 A pilonidal cyst does not cause symptoms unless it becomes infected. Once a pilonidal cyst 
becomes infected, it is commonly referred to as a pilonidal abscess. Infection may cause the
 following symptoms:
  Pain, redness, and swelling of the cyst and area around it
  Foul-smelling drainage from the cyst
  Fever
 Diagnosing a pilonidal cyst
 A pilonidal cyst can be diagnosed by how it looks and by its location. Your health care pro
vider will examine the suspected cyst to confirm a diagnosis. You will be told if any tests 
are needed.
 Treating a pilonidal cyst infection
 Most pilonidal cysts are left alone. If a cyst becomes infected, though, treatment is neede
d. It may include the following:
  Incision and drainage if needed, the cyst is cut open, and pus and other infected materi
al is allowed to drain.
  Antibiotic medicines for the infection. Know that medicines do not make the cyst go away
, and antibiotics have limited use in the treatment of an abscess. They also won  t keep a 
cyst from becoming infected again.
  Hot water soaks to help draw out the infection and relieve pain and itching.
  Excision of the cyst for infection that is severe, does not respond to medicine, or keep
s coming back. A surgeon cuts and removes the cyst and the tissue around it. Your health car
e provider can tell you more if this is needed.
 Preventing infection
 A pilonidal cyst can easily become infected. Do the following to help prevent infections:
  Keep the cyst and surrounding skin area clean.
  Remove hair from the area of the cyst regularly. Ask your health care provider about saf
e hair removal products or procedures.
  Avoid sitting in one position for long periods of time. This helps to reduce weight and 
pressure on your tailbone area. Sitting on a special cushion to relieve pressure on the tail
bone may also help. Ask your health care provider about where to purchase these cushions.
  Avoid tight-fitting clothing to reduce skin irritation around the cyst.
  9642-6769 [employer]. [address] [place] [address], [place] [address]. All righ
ts reserved. This information is not intended as a substitute for professional medical care.
 Always follow your healthcare professional's instructions.
 
 [facility]-ILIAC SUPPORT      --- PROSTHETICS & MOREElectronically signed by Johnathan lakhani MD at 10/08/2016  1:31 PM PDT
 documented in this encounter
 
 Progress Notes
 Johnathan Belle MD - 10/08/2016  1:16 PM PDTFormatting of this note might be different from
 the original.
 Subjective: 
  
 Patient ID: Marei Fermin is a 41 y.o. female.  Patient reports she had low back pain a
 few months ago, and as part of the workup she was found to have a large uterine fibroid.  S
he had hysterectomy 7 weeks ago, and has been convalescing and doing well.  She has not been
 troubled with low back pain.  4 days back at work, and she notes low back pain has returned
.  She works as a .  She also notes some well localized pain at the apex of h
er intergluteal cleft.  She wonders about a pilonidal cyst.  There does seem to be some swel
ling
 
 HPI
 Patient's medications, allergies, past medical, surgical, social and family histories were 
obtained and reviewed as appropriate.
 
 
 Review of Systems  denies radiation of pain down the legs.  No bowel or bladder dysfunction
 at this point.  No fever.
 
 Objective: 
 Physical Exam 
 Constitutional: 
 Normally developed, adequately nourished, and not in acute distress.  Does not appear acute
ly ill.  
  
 Musculoskeletal: 
      Legs:
 Further examination shows some sacroiliac tenderness.  No spinous process tenderness. 
 
 Assessment: 
 
 Pilonidal cyst, early abscess
 Mechanical low back pain
 
 Plan: 
 
 She is counseled regarding body mechanics with which she has some familiarity as a former [employer].  Suggest also a Serola sacroiliac support.
 Discussed pilonidal cyst formation and abscess.  At present this is early, and we have a ch
ance for antibiotic benefit.  Rx Keflex 500 mg 4 times a day  7 days.  Intermittent warm so
aks.  Return for poor progress.
 
 This note was dictated using dragon voice recognition software.  Occasional wrong- word or 
sound-alike substitutions may have occurred due to the inherent limitations of voice recogni
tion software.  Please read the chart carefully and recognize, using context, where these scott
bstitutions have occurred. 
 
 Electronically signed by Johnathan Belle MD at 10/08/2016  1:39 PM PDTdocumented in this enc
ounter
 
 Plan of Treatment
 Not on filedocumented as of this encounter
 
 Visit Diagnoses
 
 
+-----------------------------------------+
| Diagnosis                               |
+-----------------------------------------+
|   Pilonidal cyst with abscess - Primary |
+-----------------------------------------+
 documented in this encounter

## 2020-09-28 NOTE — XMS
Encounter Summary
  Created on: 2020
 
 Marie Fermin
 External Reference #: [id number]
 : [birth year]
 Sex: Female
 
 Demographics
 
 
+-----------------------+------------------------+
| Address               | [address]          |
|                       | [place]  [address]      |
+-----------------------+------------------------+
| Home Phone            | [phone]        |
+-----------------------+------------------------+
| Preferred Language    | Unknown                |
+-----------------------+------------------------+
| Marital Status        |                 |
+-----------------------+------------------------+
| Protestant Affiliation | Unknown                |
+-----------------------+------------------------+
| Race                  | White                  |
+-----------------------+------------------------+
| Ethnic Group          | Not  or  |
+-----------------------+------------------------+
 
 
 Author
 
 
+--------------+--------------------------------------------+
| Author       | [facility]  |
|              | and [place]                                |
+--------------+--------------------------------------------+
| Organization | [facility]  |
|              | and [place]                                |
+--------------+--------------------------------------------+
| Address      | Unknown                                    |
+--------------+--------------------------------------------+
| Phone        | Unavailable                                |
+--------------+--------------------------------------------+
 
 
 
 Support
 
 
+-----------------+--------------+---------+-----------------+
| Name            | Relationship | Address | Phone           |
+-----------------+--------------+---------+-----------------+
| Rodríguez Fermin | ECON         | Unknown | [phone] |
+-----------------+--------------+---------+-----------------+
 
 
 
 Care Team Providers
 
 
 
+-----------------------+------+-------------+
| Care Team Member Name | Role | Phone       |
+-----------------------+------+-------------+
| No, Physician         | PCP  | Unavailable |
+-----------------------+------+-------------+
 
 
 
 Reason for Referral
 Diagnostic/Screening (Routine)
 
+--------+--------+-----------+--------------+--------------+---------------+
| Status | Reason | Specialty | Diagnoses /  | Referred By  | Referred To   |
|        |        |           | Procedures   | Contact      | Contact       |
+--------+--------+-----------+--------------+--------------+---------------+
| Closed |        | Radiology |   Diagnoses  |   [place],      |   [facility]     |
|        |        |           |  Right-sided | Yary D,   | [address]  |
|        |        |           |  low back    | MD  Need     |  [place], |
|        |        |           | pain with    | updated      |  [place]           |
|        |        |           | right-sided  | address      | [address]    |
|        |        |           | sciatica,    |              | Phone:        |
|        |        |           | unspecified  |              | [phone]  |
|        |        |           | chronicity   |              |  Fax:         |
|        |        |           | Procedures   |              | [phone]  |
|        |        |           | MRI Lumbar   |              |               |
|        |        |           | Spine wo     |              |               |
|        |        |           | Contrast     |              |               |
+--------+--------+-----------+--------------+--------------+---------------+
 
 
 Evaluate & Treat (Routine)
 
+--------+--------------+---------------+--------------+--------------+---------------+
| Status | Reason       | Specialty     | Diagnoses /  | Referred By  | Referred To   |
|        |              |               | Procedures   | Contact      | Contact       |
+--------+--------------+---------------+--------------+--------------+---------------+
| Closed |   Specialty  | Physical      |   Diagnoses  |   [facility],      |   Marie, |
|        | Services     | Medicine and  |  Right-sided | Yary HOFFMAN,   |  Rony KAPLAN MD   |
|        | Required     | Rehabilitatio |  low back    | MD  Need     | [address]  |
|        |              | n             | pain with    | updated      | [address]  [place]     |
|        |              |               | right-sided  | address      | [place], [place]     |
|        |              |               | sciatica,    |              | [address]  Phone: |
|        |              |               | unspecified  |              |  [phone] |
|        |              |               | chronicity   |              |   Fax:        |
|        |              |               |              |              | [phone]  |
+--------+--------------+---------------+--------------+--------------+---------------+
 
 
 
 
 Reason for Visit
 
 
+-----------+--------------------------+
| Reason    | Comments                 |
+-----------+--------------------------+
| Back Pain | rm 3 back pain follow up |
+-----------+--------------------------+
 
 
 
 
 Encounter Details
 
 
+--------+---------+----------------------+---------------------+----------------------+
| Date   | Type    | Department           | Care Team           | Description          |
+--------+---------+----------------------+---------------------+----------------------+
| / | Office  |   [facility]   |   Yary Jeff,  | Right-sided low back |
| 2016   | Visit   | CARE  [address] | MD  Need updated    |  pain with           |
|        |         |   [place]    | address             | right-sided          |
|        |         | [address]           |                     | sciatica,            |
|        |         | [phone]         |                     | unspecified          |
|        |         |                      |                     | chronicity (Primary  |
|        |         |                      |                     | Dx)                  |
+--------+---------+----------------------+---------------------+----------------------+
 
 
 
 Social History
 
 
+--------------------+-------+-----------+--------+------+
| Tobacco Use        | Types | Packs/Day | Years  | Date |
|                    |       |           | Used   |      |
+--------------------+-------+-----------+--------+------+
| Current Every Day  |       | 1         |        |      |
| Smoker             |       |           |        |      |
+--------------------+-------+-----------+--------+------+
 
 
 
+---------------------+---+---+---+
| Smokeless Tobacco:  |   |   |   |
| Never Used          |   |   |   |
+---------------------+---+---+---+
 
 
 
+-------------+-------------+---------+----------+
| Alcohol Use | Drinks/Week | oz/Week | Comments |
+-------------+-------------+---------+----------+
| Not Asked   |             |         |          |
+-------------+-------------+---------+----------+
 
 
 
+------------------+---------------+
| Sex Assigned at  | Date Recorded |
| Birth            |               |
+------------------+---------------+
| Not on file      |               |
+------------------+---------------+
 documented as of this encounter
 
 Last Filed Vital Signs
 
 
+-------------------+----------------------+----------------------+----------+
 
| Vital Sign        | Reading              | Time Taken           | Comments |
+-------------------+----------------------+----------------------+----------+
| Blood Pressure    | 128/67               | 2016  8:07 AM  |          |
|                   |                      | PDT                  |          |
+-------------------+----------------------+----------------------+----------+
| Pulse             | 101                  | 2016  8:07 AM  |          |
|                   |                      | PDT                  |          |
+-------------------+----------------------+----------------------+----------+
| Temperature       | 36.3   C (97.3   F)  | 2016  8:07 AM  |          |
|                   |                      | PDT                  |          |
+-------------------+----------------------+----------------------+----------+
| Respiratory Rate  | 18                   | 2016  8:07 AM  |          |
|                   |                      | PDT                  |          |
+-------------------+----------------------+----------------------+----------+
| Oxygen Saturation | 96%                  | 2016  8:07 AM  |          |
|                   |                      | PDT                  |          |
+-------------------+----------------------+----------------------+----------+
| Inhaled Oxygen    | -                    | -                    |          |
| Concentration     |                      |                      |          |
+-------------------+----------------------+----------------------+----------+
| Weight            | 105 kg (231 lb 8 oz) | 2016  8:07 AM  |          |
|                   |                      | PDT                  |          |
+-------------------+----------------------+----------------------+----------+
| Height            | -                    | -                    |          |
+-------------------+----------------------+----------------------+----------+
| Body Mass Index   | 39.74                | 2016  9:26 AM  |          |
|                   |                      | PDT                  |          |
+-------------------+----------------------+----------------------+----------+
 documented in this encounter
 
 Patient Instructions
 Patient Instructions Yary Jeff MD - 2016  8:37 AM PDT
 Sciatica
 
 Sciatica is a condition that causes pain in the lower back and down into the buttock, hip, 
and leg. Sometimes the leg pain can happen without any back pain. Sciatica happens when a sp
inal nerve is irritated or has pressure put on it as comes out of the spinal canal in the lo
wer back. This most often happens when a bulge or rupture of a nearby spinal disk presses on
 the nerve. Sciatica can also be caused by a narrowing of the spinal canal (spinal stenosis)
 or spasm of the muscle in the buttocks that the sciatic nerve passes through (pyriform musc
le). Sciatica is also called lumbar radiculopathy.
 Sciatica may begin after a sudden twisting or bending force, such as in a car accident. Or 
it can happen after a simple awkward movement. In either case, muscle spasm often also happe
ns. Muscle spasm makes the pain worse.
 A health care provider makes a diagnosis of sciatica from your symptoms and a physical exam
. Unless you had an injury from a car accident or fall, you usually won  t have X-rays take
n at this time. This is because the nerves and disks in your back can  t be seen on an X-ra
y. If the provider sees signs of a compressed nerve, you will need to schedule an MRI scan a
s an outpatient. Signs of a compressed nerve include loss of strength in a leg.
 Most sciatica gets better with medicine, exercise, and physical therapy. If your symptoms c
ontinue after at least 3 months of medical treatment, you may need surgery.
 Home care
 Follow these tips when caring for yourself at home:
  You may need to stay in bed the first few days. But as soon as possible, begin sitting o
r walking. This will help you avoid problems that come from staying in bed for long periods.
  When in bed, try to find a position that is comfortable. A firm mattress is best. Try ly
ing flat on your back with pillows under your knees. You can also try lying on your side wit
h your knees bent up toward your chest and a pillow between your knees.
  Avoid sitting for long periods. This puts more stress on your lower back than standing o
r walking.
 
  Use heat from a hot shower, hot bath, or heating pad to help ease pain. Massage can also
 help. You can also try using an ice pack. You can make your own ice pack by putting ice cub
es in a plastic bag. Wrap the bag in a thin towel. Try both heat and cold to see which works
 best. Use the method that feels best for 20 minutes several times a day.
  You may use acetaminophen or ibuprofen to ease pain, unless another pain medicine was pr
escribed. Note: If you have chronic liver or kidney disease, talk with your health care prov
ider before taking these medicines. Also talk with your provider if you  ve had a stomach u
lcer or GI bleeding.
  Use safe lifting methods. Don  t lift anything heavier than 15 pounds until all of the 
pain is gone.
 Follow-up care
 Follow up with your health care provider if your symptoms don  t start to get better after
 1 week. You may need physical therapy or additional tests.
 If X-rays were taken, they will be looked at by a radiologist. You will be told of any new 
findings that may affect your care.
 When to seek medical advice
 Call your health care provider right awayif any of these occur:
  Pain gets worse even after taking prescribed medicine
  Weakness or numbness in 1 or both legs or hips
  Numbness in your groin or genital area
  You can  t control your bowel or bladder
  Fever
  Redness or swelling over your back or spine
  [id number] [employer]. [address] [place] [address], [place] [address]. All righ
ts reserved. This information is not intended as a substitute for professional medical care.
 Always follow your healthcare professional's instructions.
 
 Electronically signed by Yary Jeff MD at 2016  8:38 AM PDT
 documented in this encounter
 
 Progress Notes
 Yary Jeff MD - 2016  8:24 AM PDTFormatting of this note might be different fro
m the original.
 Subjective: 
  
 Chief Complaint: Back Pain
  
 
 Marie is a 41 y.o. female who comes in complaining of low back pain 16 .  It began afte
r leaning over to  a bag on the floor, bent her knees. Pain is primarily low back, wi
th + radiation to R leg.  Has no  weakness but positive paresthesias radiating to right inne
r thigh to the right calf.  She demonstrates a dermatomal distribution.  Marie has been off 
work and she would like to try to go back to work.  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  Her goal is to try 6 hours shifts this week to see if she 
can  one place for that long.  She would like to go ahead and get an MRI and go see 
the physiatrist.  No other complaints.
 Saw [facility] private office where Dr. Lundberg.  She has had a lot of trouble
 getting the paperwork done correctly and she will not be going back to [facility].   On short 
term disability.  PT referral.  Can't do PT here d/t 600$ copay.  Got referral to [facility] is covered.  Really bad skin reaction with steroids.  
 Patient's medications, allergies, past medical, surgical, social and family histories were 
reviewed and updated as appropriate.
 [facility]
  ROS: see HPI  denies numbness of the genitals, loss of bowel or bladder control or leg wea
kness
 Objective: 
 
 /67 mmHg | Pulse 101 | Temp(Src) 36.3 C (97.3 F) (Temporal) | Resp 18 | Wt 105.00
8 kg (231 lb 8 oz) | SpO2 96%
 
 
 General Appearance:  Alert, cooperative, changes position frequently and seems more comfort
able with standing, appears stated age
 Head:  Normocephalic, without obvious abnormality, atraumatic
 Back:  Spine nontender, +tender in the R lower lumbar paraspinous muscles + spasm at L3L4 a
francesca
 Range of motion low back 45 deg flexion, 5 deg extension
 Lower extremities with grossly normal strength and sensation throughout, with reflexes norm
al to knee jerk and ankle jerk bilaterally, with negative straight leg raise bilaterally
 Skin: Normal without rash or ecchymosis.
 I reviewed the notes from Dr. Botello and Dr. Garrison.  They treated her conservatively as
 appropriate for initial low back injury.  I looked at her x-ray report.  The x-ray showed n
o abnormality to account for the low back pain with radicular symptoms.  Dr. Trujillo sa
w her on  and that it was too early to get an MRI at that point.
 
 Assessment and Plans: 
 
 Assessment:  Low back pain with right L3-L4 radiculopathy
 Plan:   At this point she had this for 2 months.  I feel it appropriate for her to follow t
leanna with physical therapy.  It has been delayed due to administrative errors.  She has fa
iled conservative measures.  She cannot tolerate steroids.  She is not improved on her own. 
 She does not have weakness but she does have paresthesias in the leg.  She has a normal x-r
ay which does not give indication of why she has this radiculopathy.  So at this point I terra
l request an MRI of the lumbar spine.  I also put in a referral to physiatry.  She should go
 to physical therapy.  I explained the difference between physiatry and physical therapy.
 I put her back to work without restriction at her request and if she finds she cannot do he
r job she should come back here for limited duty note.  I encouraged her to try to find anot
her primary care provider.
 
 Follow up immediately if loss or bowel or bladder control, saddle anesthesia or leg weaknes
s.
 
 This note was dictated using dragon voice recognition software. Occasional wrong- word or s
ound-alike substitutions may have occurred due to the inherent limitations of voice [employer]
ion software. Please read the chart carefully and recognize, using context, where these subs
titutions have occurred. 
 
 Electronically signed by Yary Jeff MD at 2016 11:05 AM PDTdocumented in this en
counter
 
 Plan of Treatment
 
 
+------------------+-------------+--------+----------------------+---------------------+
| Name             | Type        | Priori | Associated Diagnoses | Order Schedule      |
|                  |             | ty     |                      |                     |
+------------------+-------------+--------+----------------------+---------------------+
| * [facility]      | Outpatient  | Routin |   Right-sided low    | Ordered: 2016 |
| Physiatry - AMB  | Referral    | e      | back pain with       |                     |
| Referral         |             |        | right-sided          |                     |
|                  |             |        | sciatica,            |                     |
|                  |             |        | unspecified          |                     |
|                  |             |        | chronicity           |                     |
+------------------+-------------+--------+----------------------+---------------------+
 documented as of this encounter
 
 Procedures
 
 
+----------------------+--------+-------------+----------------------+----------------------
+
 
| Procedure Name       | Priori | Date/Time   | Associated Diagnosis | Comments             
|
|                      | ty     |             |                      |                      
|
+----------------------+--------+-------------+----------------------+----------------------
+
| MRI LUMBAR SPINE WO  | Routin | 2016  |   Right-sided low    |   Results for this   
|
| CONTRAST             | e      | 11:58 AM    | back pain with       | procedure are in the 
|
|                      |        | PDT         | right-sided          |  results section.    
|
|                      |        |             | sciatica,            |                      
|
|                      |        |             | unspecified          |                      
|
|                      |        |             | chronicity           |                      
|
+----------------------+--------+-------------+----------------------+----------------------
+
 documented in this encounter
 
 Results
 MRI Lumbar Spine wo Contrast (2016 11:58 AM PDT)
 
+----------+
| Specimen |
+----------+
|          |
+----------+
 
 
 
+------------------------------------------------------------------------+-----------------+
| Narrative                                                              | Performed At    |
+------------------------------------------------------------------------+-----------------+
|   EXAM: MRI LUMBAR SPINE WO CONTRAST dated 2016 10:00 AM          |   [facility]    |
| HISTORY:2 months of low back pain with radiation into R leg            | [facility]        |
| COMPARISON:   2016 radiographs.     TECHNIQUE: Multiplanar   | [facility]  |
| multisequence MR imaging of the lumbar spine without  contrast.   This | - IMAGING       |
|  is performed on a 3Tesla MRI scanner.      FINDINGS:There are 5       |                 |
| lumbar-type vertebral bodies.   This is either assumed for  counting   |                 |
| purposes or documented on prior studies.   No scoliosis.   No          |                 |
| significant  spondylolisthesis.   There is mild disc desiccation and   |                 |
| narrowing at L4-L5.   There are no significant endplate degenerative   |                 |
| changes.   The vertebral body  height and signal is normal in all      |                 |
| levels.   The conus terminates at the L1  level.   The visible distal  |                 |
| cord is unremarkable.   The terminal ventricle is  visible.     The    |                 |
| following levels are evaluated in the axial plane:     T12-L1: No      |                 |
| significant central stenosis or neural foraminal narrowing.     L1-2:  |                 |
| No significant central stenosis or neural foraminal narrowing.         |                 |
| L2-3: No significant central stenosis or neural foraminal narrowing.   |                 |
|    L3-4: No significant central stenosis or neural foraminal           |                 |
| narrowing.      L4-5: No significant central stenosis or neural        |                 |
| foraminal narrowing.      L5-S1: There is a small right eccentric disc |                 |
|  protrusion which mildly narrows the  right subarticular recess.       |                 |
|   There is mild right neural foraminal narrowing.      There is a      |                 |
| partially visualized large heterogeneous mass emanating from the top   |                 |
| of the uterus.   This measures approximately 17 cm in craniocaudal     |                 |
| dimension.     IMPRESSION -      Mild lumbar spondylosis at L4-L5.     |                 |
 
|  17 cm heterogeneous mass emanating from fundus of the uterus.   This  |                 |
| most common  would represent a fibroid.     Dictated and Signed by:    |                 |
| Homer Vazquez MD    Electronically signed: 2016 1:19 PM        |                 |
+------------------------------------------------------------------------+-----------------+
 
 
 
+------------------------------------------------------------------------------------------+
| Procedure Note                                                                           |
+------------------------------------------------------------------------------------------+
|   Charlie Briceño Results In - 2016  1:22 PM PDT  EXAM: MRI LUMBAR SPINE WO CONTRAST      |
| dated 2016 10:00 AMHISTORY:2 months of low back pain with radiation into R          |
| legCOMPARISON:  2016 radiographs.TECHNIQUE: Multiplanar multisequence MR       |
| imaging of the lumbar spine withoutcontrast.  This is performed on a 3Tesla MRI scanner. |
|  FINDINGS:There are 5 lumbar-type vertebral bodies.  This is either assumed forcounting  |
| purposes or documented on prior studies.  No scoliosis.  No                              |
| significantspondylolisthesis.  There is mild disc desiccation and narrowing at L4-L5.    |
| There are no significant endplate degenerative changes.  The vertebral bodyheight and    |
| signal is normal in all levels.  The conus terminates at the B4sumkc.  The visible       |
| distal cord is unremarkable.  The terminal ventricle isvisible.The following levels are  |
| evaluated in the axial plane:T12-L1: No significant central stenosis or neural foraminal |
|  narrowing.L1-2: No significant central stenosis or neural foraminal narrowing.L2-3: No  |
| significant central stenosis or neural foraminal narrowing.L3-4: No significant central  |
| stenosis or neural foraminal narrowing. L4-5: No significant central stenosis or neural  |
| foraminal narrowing. L5-S1: There is a small right eccentric disc protrusion which       |
| mildly narrows theright subarticular recess.  There is mild right neural foraminal       |
| narrowing. There is a partially visualized large heterogeneous mass emanating from the   |
| topof the uterus.  This measures approximately 17 cm in craniocaudal                     |
| dimension.IMPRESSION - Mild lumbar spondylosis at L4-L5.17 cm heterogeneous mass         |
| emanating from fundus of the uterus.  This most commonwould represent a fibroid.Dictated |
|  and Signed by: Homer Vazquez MD  Electronically signed: 2016 1:19 PM            |
|L1-2: No significant central stenosis or neural foraminal narrowing.                      |
|                                                                                          |
|L2-3: No significant central stenosis or neural foraminal narrowing.                      |
|                                                                                          |
|L3-4: No significant central stenosis or neural foraminal narrowing.                      |
|                                                                                          |
|L4-5: No significant central stenosis or neural foraminal narrowing.                      |
|                                                                                          |
|L5-S1: There is a small right eccentric disc protrusion which mildly narrows the          |
|right subarticular recess.  There is mild right neural foraminal narrowing.               |
|                                                                                          |
|There is a partially visualized large heterogeneous mass emanating from the top           |
|of the uterus.  This measures approximately 17 cm in craniocaudal dimension.              |
|                                                                                          |
|IMPRESSION -                                                                              |
|                                                                                          |
|Mild lumbar spondylosis at L4-L5.                                                         |
|                                                                                          |
|17 cm heterogeneous mass emanating from fundus of the uterus.  This most common           |
|would represent a fibroid.                                                                |
|                                                                                          |
|Dictated and Signed by: Homer Vazquez MD                                               |
| Electronically signed: 2016 1:19 PM                                                 |
+------------------------------------------------------------------------------------------+
 
 
 
+----------------------+---------------------+--------------------+----------------+
| Performing           | Address             | City/State/Zipcode | Phone Number   |
 
| Organization         |                     |                    |                |
+----------------------+---------------------+--------------------+----------------+
|   [facility]     |   [address] |   [place]  |   [phone] |
| [facility]  |                     | [id number]              |                |
| - IMAGING            |                     |                    |                |
+----------------------+---------------------+--------------------+----------------+
 documented in this encounter
 
 Visit Diagnoses
 
 
+-----------------------------------------------------------------------------------------+
| Diagnosis                                                                               |
+-----------------------------------------------------------------------------------------+
|   Right-sided low back pain with right-sided sciatica, unspecified chronicity - Primary |
+-----------------------------------------------------------------------------------------+
 documented in this encounter

## 2020-09-28 NOTE — XMS
Encounter Summary
  Created on: 2020
 
 Marie Fermin
 External Reference #: [id number]
 : [birth year]
 Sex: Female
 
 Demographics
 
 
+-----------------------+------------------------+
| Address               | [address]          |
|                       | [place]  [address]      |
+-----------------------+------------------------+
| Home Phone            | [phone]        |
+-----------------------+------------------------+
| Preferred Language    | Unknown                |
+-----------------------+------------------------+
| Marital Status        |                 |
+-----------------------+------------------------+
| Pentecostalism Affiliation | Unknown                |
+-----------------------+------------------------+
| Race                  | White                  |
+-----------------------+------------------------+
| Ethnic Group          | Not  or  |
+-----------------------+------------------------+
 
 
 Author
 
 
+--------------+--------------------------------------------+
| Author       | [facility]  |
|              | and [place]                                |
+--------------+--------------------------------------------+
| Organization | [facility]  |
|              | and [place]                                |
+--------------+--------------------------------------------+
| Address      | Unknown                                    |
+--------------+--------------------------------------------+
| Phone        | Unavailable                                |
+--------------+--------------------------------------------+
 
 
 
 Support
 
 
+-----------------+--------------+---------+-----------------+
| Name            | Relationship | Address | Phone           |
+-----------------+--------------+---------+-----------------+
| Rodríguez Fermin | ECON         | Unknown | [phone] |
+-----------------+--------------+---------+-----------------+
 
 
 
 Care Team Providers
 
 
 
+-----------------------+------+-------------+
| Care Team Member Name | Role | Phone       |
+-----------------------+------+-------------+
 PCP  | Unavailable |
+-----------------------+------+-------------+
 
 
 
 Encounter Details
 
 
+--------+-----------+----------------------+-----------+-------------+
| Date   | Type      | Department           | Care Team | Description |
+--------+-----------+----------------------+-----------+-------------+
| / | Hospital  |   [facility] |           |             |
|    | Encounter |  [facility]   |           |             |
|        |           | [address] |           |             |
|        |           |   [place]    |           |             |
|        |           | [id number]           |           |             |
|        |           | [phone]         |           |             |
+--------+-----------+----------------------+-----------+-------------+
 
 
 
 Social History
 
 
+----------------+-------+-----------+--------+------+
| Tobacco Use    | Types | Packs/Day | Years  | Date |
|                |       |           | Used   |      |
+----------------+-------+-----------+--------+------+
| Never Assessed |       |           |        |      |
+----------------+-------+-----------+--------+------+
 
 
 
+------------------+---------------+
| Sex Assigned at  | Date Recorded |
| Birth            |               |
+------------------+---------------+
| Not on file      |               |
+------------------+---------------+
 documented as of this encounter
 
 Plan of Treatment
 Not on filedocumented as of this encounter
 
 Visit Diagnoses
 Not on filedocumented in this encounter

## 2020-09-28 NOTE — XMS
Encounter Summary
  Created on: 2020
 
 Marie Fermin
 External Reference #: [id number]
 : [birth year]
 Sex: Female
 
 Demographics
 
 
+-----------------------+------------------------+
| Address               | [address]          |
|                       | [place]  [address]      |
+-----------------------+------------------------+
| Home Phone            | [phone]        |
+-----------------------+------------------------+
| Preferred Language    | Unknown                |
+-----------------------+------------------------+
| Marital Status        |                 |
+-----------------------+------------------------+
| Sikhism Affiliation | Unknown                |
+-----------------------+------------------------+
| Race                  | White                  |
+-----------------------+------------------------+
| Ethnic Group          | Not  or  |
+-----------------------+------------------------+
 
 
 Author
 
 
+--------------+--------------------------------------------+
| Author       | [facility]  |
|              | and [place]                                |
+--------------+--------------------------------------------+
| Organization | [facility]  |
|              | and [place]                                |
+--------------+--------------------------------------------+
| Address      | Unknown                                    |
+--------------+--------------------------------------------+
| Phone        | Unavailable                                |
+--------------+--------------------------------------------+
 
 
 
 Support
 
 
+-----------------+--------------+---------+-----------------+
| Name            | Relationship | Address | Phone           |
+-----------------+--------------+---------+-----------------+
| Rodríguez Fermin | ECON         | Unknown | [phone] |
+-----------------+--------------+---------+-----------------+
 
 
 
 Care Team Providers
 
 
 
+-----------------------+------+-------------+
| Care Team Member Name | Role | Phone       |
+-----------------------+------+-------------+
| No, Physician         | PCP  | Unavailable |
+-----------------------+------+-------------+
 
 
 
 Reason for Visit
 
 
+-----------+--------------------------------+
| Reason    | Comments                       |
+-----------+--------------------------------+
| Follow-up | room 5/cough, swelling in legs |
+-----------+--------------------------------+
 
 
 
 Encounter Details
 
 
+--------+---------+----------------------+----------------------+----------------------+
| Date   | Type    | Department           | Care Team            | Description          |
+--------+---------+----------------------+----------------------+----------------------+
| / | Office  |   [facility]   |   Segundo Botello    | Cough (Primary Dx);  |
|    | Visit   | CARE  [address] | Godwin Samayoa MD      | RML pneumonia;       |
|        |         |   [place]    | [address]       | Asthma, mild         |
|        |         | [address]           | [place]      | intermittent,        |
|        |         | [phone]         | [id number]  [phone]  | uncomplicated        |
|        |         |                      |  [phone] (Fax)  |                      |
+--------+---------+----------------------+----------------------+----------------------+
 
 
 
 Social History
 
 
+--------------------+-------+-----------+--------+------+
| Tobacco Use        | Types | Packs/Day | Years  | Date |
|                    |       |           | Used   |      |
+--------------------+-------+-----------+--------+------+
| Current Every Day  |       | 1         |        |      |
| Smoker             |       |           |        |      |
+--------------------+-------+-----------+--------+------+
 
 
 
+---------------------+---+---+---+
| Smokeless Tobacco:  |   |   |   |
| Never Used          |   |   |   |
+---------------------+---+---+---+
 
 
 
+-------------+-------------+---------+----------+
| Alcohol Use | Drinks/Week | oz/Week | Comments |
+-------------+-------------+---------+----------+
 
| Not Asked   |             |         |          |
+-------------+-------------+---------+----------+
 
 
 
+------------------+---------------+
| Sex Assigned at  | Date Recorded |
| Birth            |               |
+------------------+---------------+
| Not on file      |               |
+------------------+---------------+
 documented as of this encounter
 
 Last Filed Vital Signs
 
 
+-------------------+---------------------+----------------------+----------+
| Vital Sign        | Reading             | Time Taken           | Comments |
+-------------------+---------------------+----------------------+----------+
| Blood Pressure    | 109/69              | 2015 12:56 PM  |          |
|                   |                     | PST                  |          |
+-------------------+---------------------+----------------------+----------+
| Pulse             | 86                  | 2015 12:56 PM  |          |
|                   |                     | PST                  |          |
+-------------------+---------------------+----------------------+----------+
| Temperature       | 36.9   C (98.4   F) | 2015 12:56 PM  |          |
|                   |                     | PST                  |          |
+-------------------+---------------------+----------------------+----------+
| Respiratory Rate  | 16                  | 2015 12:56 PM  |          |
|                   |                     | PST                  |          |
+-------------------+---------------------+----------------------+----------+
| Oxygen Saturation | 100%                | 2015 12:56 PM  |          |
|                   |                     | PST                  |          |
+-------------------+---------------------+----------------------+----------+
| Inhaled Oxygen    | -                   | -                    |          |
| Concentration     |                     |                      |          |
+-------------------+---------------------+----------------------+----------+
| Weight            | -                   | -                    |          |
+-------------------+---------------------+----------------------+----------+
| Height            | -                   | -                    |          |
+-------------------+---------------------+----------------------+----------+
| Body Mass Index   | -                   | -                    |          |
+-------------------+---------------------+----------------------+----------+
 documented in this encounter
 
 Patient Instructions
 Patient Instructions Segundo Botello Jr., MD - 2015  2:22 PM PSTNo work for 2
 days
 Follow-up with your doctor or the clinic if not improving in 5 days.
 Take medication as directed
 Increase fluid intake
 Recheck sooner, in the clinic, with your doctor or in the ER, if your symptoms worsen or ne
w problems develop Electronically signed by Segundo Botello Jr., MD at 2015  2:
23 PM PST
 documented in this encounter
 
 Progress Notes
 Segundo Botello Jr., MD - 2015  5:36 PM PSTFormatting of this note might be d
ifferent from the original.
 Marie Fermin was seen on  with diagnosis of bronchitis and placed on Zithromax.  Gretchen villareal returns with a persistent cough productive of some yellow phlegm and she feels as if her l
egs are swollen.  She's had no chest pain or abdominal pain.  She has a history of asthma bu
t is not currently taking any medications
 
 Exam: No distress, non-toxic in appearance
 Nose: clear
 Ears:  TM's  not inflamed
 Throat: erythema, no exudate
 Neck:  Supple, no stridor, no adenopathy
 Chest:  No rales, no rhonchi, few expiratory wheezes, good breath sounds bilaterally, no re
spiratory distress
 Heart:  Regular rhythm, no murmur, no gallop, no rub
 Abdomen: Soft, bowel sounds normal, no hepatosplenomegaly, no CVA tenderness, no localized 
tenderness guarding or rebound
 Extremities: No pitting edema
 
 Chest x-ray: Slight right middle lobe infiltrate
 Recent Results (from the past 24 hour(s)) 
 CBC WITH DIFFERENTIAL 
     Result Value Range 
  WBC 9.3  4.0-11.0 K/uL 
  RBC 4.39  3.70-5.20 M/uL 
  Hgb 13.8  11.5-16.0 g/dL 
  Hct 39.8  34.0-47.0 % 
  MCV 90.7  83.0-101.0 fL 
  MCH 31.4  28.0-35.0 pg 
  MCHC 34.7  32.0-36.0 g/dL 
  RDW 13.1  <15.0 % 
  Platelet Count 325  140-440 K/uL 
  MPV 7.2   
  % Neutrophils 69.0  45.0-82.0 % 
  % Lymphocytes 22.6  20.0-45.0 % 
  % Monocytes 5.8  4.0-12.0 % 
  % Eosinophils 1.7  0.0-5.0 % 
  % Basophils 0.9  0.0-1.0 % 
  Absolute Neutrophils 6.40  1.80-8.50 K/uL 
  Absolute Lymphocytes 2.10  0.60-3.20 K/uL 
  Absolute Monocytes 0.50  0.00-1.00 K/uL 
  Absolute Eosinophils 0.20  0.00-0.40 K/uL 
  Absolute Basophils 0.10  0.00-0.10 K/uL 
 COMPREHENSIVE METABOLIC PANEL 
     Result Value Range 
    136-149 mmol/L 
  K 3.9  3.5-5.1 mmol/L 
     mmol/L 
  CO2 28  24-31 mmol/L 
  ANION GAP 7  3-16 mmol/L 
  GLUCOSE 103   mg/dL 
  BUN 4 (*) 7-18 mg/dL 
  Creatinine, Serum/Plasma 0.56 (*) 0.60-1.30 mg/dL 
  eGFR if not A[employer] AMERICAN >60  >=60 mL/min/1.73m2 
  CALCIUM 9.3  8.3-10.5 mg/dL 
  ALBUMIN 3.8  3.2-5.0 g/dL 
  BILIRUBIN TOTAL 0.7  0.1-1.5 mg/dL 
  Total protein 7.0  6.0-7.8 g/dL 
  AST 27  10-42 U/L 
  ALT 19  6-45 U/L 
  ALK PHOS 93   U/L 
  GLOBULIN 3.2   
  Albumin/Globulin ratio 1.2   
 
  BUN/CREA 7.1   
   
 Diagnosis: Right middle lobe pneumonia, asthmaElectronically signed by Segundo Peters ms, Jr., MD at 2015  5:40 PM PSTdocumented in this encounter
 
 Plan of Treatment
 Not on filedocumented as of this encounter
 
 Procedures
 
 
+------------------+--------+-------------+----------------------+----------------------+
| Procedure Name   | Priori | Date/Time   | Associated Diagnosis | Comments             |
|                  | ty     |             |                      |                      |
+------------------+--------+-------------+----------------------+----------------------+
| XR CHEST PA AND  | Routin | 2015  |   Cough              |   Results for this   |
| LATERAL          | e      |  2:39 PM    |                      | procedure are in the |
|                  |        | PST         |                      |  results section.    |
+------------------+--------+-------------+----------------------+----------------------+
| CBC WITH         | Routin | 2015  |   Cough              |   Results for this   |
| DIFFERENTIAL     | e      |  1:40 PM    |                      | procedure are in the |
|                  |        | PST         |                      |  results section.    |
+------------------+--------+-------------+----------------------+----------------------+
| COMPREHENSIVE    | Routin | 2015  |   Cough              |   Results for this   |
| METABOLIC PANEL  | e      |  1:40 PM    |                      | procedure are in the |
|                  |        | PST         |                      |  results section.    |
+------------------+--------+-------------+----------------------+----------------------+
 documented in this encounter
 
 Results
 XR Chest PA and Lateral (2015  2:39 PM PST)
 
+----------+
| Specimen |
+----------+
|          |
+----------+
 
 
 
+------------------------------------------------------------------------+-----------------+
| Narrative                                                              | Performed At    |
+------------------------------------------------------------------------+-----------------+
|   XR CHEST PA AND LATERAL 2015 2:01 PM     HISTORY: cough.         |   [facility]    |
| COMPARISON: None.     Findings:  Heart size is within normal limits.   | [facility]        |
| Aorta is normal. Mediastinum is  unremarkable. Central pulmonary       | [facility]  |
| vasculature is normal. There is slight haziness  in the medial aspect  | - IMAGING       |
| of the right lung base, possibly in the right middle lobe,  that could |                 |
|  represent developing pneumonia. There are no acute osseous            |                 |
| abnormalities.     IMPRESSION -  Possible developing pneumonia in      |                 |
| right middle lobe.     Dictated and Signed by: Bryson Nicholas MD        |                 |
| Electronically signed: 2015 8:50 AM                                |                 |
+------------------------------------------------------------------------+-----------------+
 
 
 
+------------------------------------------------------------------------------------------+
| Procedure Note                                                                           |
+------------------------------------------------------------------------------------------+
|   [place], Rad Results In - 2015  8:54 AM PST  XR CHEST PA AND LATERAL 2015 2:01   |
 
| PMHISTORY: cough.COMPARISON: None.Findings:Heart size is within normal limits. Aorta is  |
| normal. Mediastinum isunremarkable. Central pulmonary vasculature is normal. There is    |
| slight hazinessin the medial aspect of the right lung base, possibly in the right middle |
|  lobe,that could represent developing pneumonia. There are no acute                      |
| osseousabnormalities.IMPRESSION -Possible developing pneumonia in right middle           |
| lobe.Dictated and Signed by: Bryson Nicholas MD  Electronically signed: 2015 8:50 AM    |
|Heart size is within normal limits. Aorta is normal. Mediastinum is                       |
|unremarkable. Central pulmonary vasculature is normal. There is slight haziness          |
|in the medial aspect of the right lung base, possibly in the right middle lobe,           |
|that could represent developing pneumonia. There are no acute osseous                     |
|abnormalities.                                                                           |
|                                                                                          |
|IMPRESSION -                                                                              |
|Possible developing pneumonia in right middle lobe.                                       |
|                                                                                          |
|Dictated and Signed by: Bryson Nicholas MD                                                   |
| Electronically signed: 2015 8:50 AM                                                  |
+------------------------------------------------------------------------------------------+
 
 
 
+----------------------+---------------------+--------------------+----------------+
| Performing           | Address             | City/State/Zipcode | Phone Number   |
| Organization         |                     |                    |                |
+----------------------+---------------------+--------------------+----------------+
|   [facility]     |   [address] |   [place], [place]  |   [phone] |
| [facility]  |                     | [address]              |                |
| - IMAGING            |                     |                    |                |
+----------------------+---------------------+--------------------+----------------+
 Comprehensive Metabolic Panel (2015  1:40 PM PST)
 
+-------------+--------------------------+-----------------+-------------+--------------+
| Component   | Value                    | Ref Range       | Performed   | Pathologist  |
|             |                          |                 | At          | Signature    |
+-------------+--------------------------+-----------------+-------------+--------------+
| Na          | 141                      | 136 - 149       | [facility]  |              |
|             |                          | mmol/L          | [facility]    |              |
|             |                          |                 | MED[facility]     |              |
|             |                          |                 | CENTER -    |              |
|             |                          |                 | LABORATORY  |              |
+-------------+--------------------------+-----------------+-------------+--------------+
| K           | 3.9                      | 3.5 - 5.1       | [facility]  |              |
|             |                          | mmol/L          | [facility]    |              |
|             |                          |                 | MEDICAL     |              |
|             |                          |                 | CENTER -    |              |
|             |                          |                 | LABORATORY  |              |
+-------------+--------------------------+-----------------+-------------+--------------+
| Cl          | 106                      | 98 - 109 mmol/L | [facility]  |              |
|             |                          |                 | [facility]    |              |
|             |                          |                 | MEDICAL     |              |
|             |                          |                 | CENTER -    |              |
|             |                          |                 | LABORATORY  |              |
+-------------+--------------------------+-----------------+-------------+--------------+
| CO2         | 28                       | 24 - 31 mmol/L  | [facility]  |              |
|             |                          |                 | [facility]    |              |
|             |                          |                 | MEDICAL     |              |
|             |                          |                 | CENTER -    |              |
|             |                          |                 | LABORATORY  |              |
+-------------+--------------------------+-----------------+-------------+--------------+
| Anion Gap   | 7                        | 3 - 16 mmol/L   | [facility]  |              |
 
|             |                          |                 | [facility]    |              |
|             |                          |                 | MEDICAL     |              |
|             |                          |                 | CENTER -    |              |
|             |                          |                 | LABORATORY  |              |
+-------------+--------------------------+-----------------+-------------+--------------+
| Glucose     | 103                      | 70 - 109 mg/dL  | [facility]  |              |
|             |                          |                 | [facility]    |              |
|             |                          |                 | MED[facility]     |              |
|             |                          |                 | CENTER -    |              |
|             |                          |                 | LABORATORY  |              |
+-------------+--------------------------+-----------------+-------------+--------------+
| BUN         | 4 (L)                    | 7 - 18 mg/dL    | [facility]  |              |
|             |                          |                 | [facility]    |              |
|             |                          |                 | MEDICAL     |              |
|             |                          |                 | CENTER -    |              |
|             |                          |                 | LABORATORY  |              |
+-------------+--------------------------+-----------------+-------------+--------------+
| Creatinine  | 0.56 (L)                 | 0.60 - 1.30     | [facility]  |              |
|             |                          | mg/dL           | [facility]    |              |
|             |                          |                 | MEDICAL     |              |
|             |                          |                 | CENTER -    |              |
|             |                          |                 | LABORATORY  |              |
+-------------+--------------------------+-----------------+-------------+--------------+
| eGFR,       | >60Comment: GLOMERULAR   | >=60            | [facility]  |              |
| non- | FILTRATION               | mL/min/1.73m2   | [facility]    |              |
|  [facility]   | RATE,ESTIMATED           |                 | MEDICAL     |              |
|             |   mL/min/1.53m6Jark than |                 | CENTER -    |              |
|             |  60    Chronic kidney    |                 | LABORATORY  |              |
|             | disease,if found over a  |                 |             |              |
|             | 3-month period.Less than |                 |             |              |
|             |  15    Kidney failureFor |                 |             |              |
|             |                   |                 |             |              |
|             | Americans,multiply the   |                 |             |              |
|             | calculated GFR by 1.21.  |                 |             |              |
|             |                          |                 |             |              |
+-------------+--------------------------+-----------------+-------------+--------------+
| Calcium     | 9.3                      | 8.3 - 10.5      | [facility]  |              |
|             |                          | mg/dL           | [facility]    |              |
|             |                          |                 | [facility]     |              |
|             |                          |                 | CENTER -    |              |
|             |                          |                 | LABORATORY  |              |
+-------------+--------------------------+-----------------+-------------+--------------+
| Albumin     | 3.8                      | 3.2 - 5.0 g/dL  | [facility]  |              |
|             |                          |                 | [facility]    |              |
|             |                          |                 | MEDICAL     |              |
|             |                          |                 | CENTER -    |              |
|             |                          |                 | LABORATORY  |              |
+-------------+--------------------------+-----------------+-------------+--------------+
| Bilirubin   | 0.7                      | 0.1 - 1.5 mg/dL | [facility]  |              |
| Total       |                          |                 | [facility]    |              |
|             |                          |                 | MEDICAL     |              |
|             |                          |                 | CENTER -    |              |
|             |                          |                 | LABORATORY  |              |
+-------------+--------------------------+-----------------+-------------+--------------+
| Total       | 7.0                      | 6.0 - 7.8 g/dL  | [facility]  |              |
| Protein     |                          |                 | [facility]    |              |
|             |                          |                 | MEDICAL     |              |
|             |                          |                 | CENTER -    |              |
|             |                          |                 | LABORATORY  |              |
+-------------+--------------------------+-----------------+-------------+--------------+
 
| AST         | 27                       | 10 - 42 U/L     | [facility]  |              |
|             |                          |                 | [facility]    |              |
|             |                          |                 | MEDICAL     |              |
|             |                          |                 | CENTER -    |              |
|             |                          |                 | LABORATORY  |              |
+-------------+--------------------------+-----------------+-------------+--------------+
| ALT         | 19                       | 6 - 45 U/L      | [facility]  |              |
|             |                          |                 | [facility]    |              |
|             |                          |                 | MEDICAL     |              |
|             |                          |                 | CENTER -    |              |
|             |                          |                 | LABORATORY  |              |
+-------------+--------------------------+-----------------+-------------+--------------+
| Alkaline    | 93                       | 40 - 110 U/L    | [facility]  |              |
| Phosphatase |                          |                 | [facility]    |              |
|             |                          |                 | MEDICAL     |              |
|             |                          |                 | CENTER -    |              |
|             |                          |                 | LABORATORY  |              |
+-------------+--------------------------+-----------------+-------------+--------------+
| Globulin    | 3.2                      | g/dL            | [facility]  |              |
|             |                          |                 | [facility]    |              |
|             |                          |                 | MEDICAL     |              |
|             |                          |                 | CENTER -    |              |
|             |                          |                 | LABORATORY  |              |
+-------------+--------------------------+-----------------+-------------+--------------+
| Albumin/[place] | 1.2                      |                 | [facility]  |              |
| bulin Ratio |                          |                 | [facility]    |              |
|             |                          |                 | MEDICAL     |              |
|             |                          |                 | CENTER -    |              |
|             |                          |                 | LABORATORY  |              |
+-------------+--------------------------+-----------------+-------------+--------------+
| BUN/Creatin | 7.1                      |                 | [facility]  |              |
| ine Ratio   |                          |                 | [facility]    |              |
|             |                          |                 | MEDICAL     |              |
|             |                          |                 | CENTER -    |              |
|             |                          |                 | LABORATORY  |              |
+-------------+--------------------------+-----------------+-------------+--------------+
 
 
 
+----------------------+
| Specimen             |
+----------------------+
| Blood - Right upper  |
| arm structure (body  |
| structure)           |
+----------------------+
 
 
 
+----------------------+--------------------+--------------------+----------------+
| Performing           | Address            | City/State/Zipcode | Phone Number   |
| Organization         |                    |                    |                |
+----------------------+--------------------+--------------------+----------------+
|   [facility]     |   [address] |   [place]  |   [phone] |
| [facility]  |                    | [address]              |                |
| - LABORATORY         |                    |                    |                |
+----------------------+--------------------+--------------------+----------------+
|   [facility]     |   [address] |   [place], [place]  |                |
| [facility]  |                    | [address], [place]         |                |
| - LABORATORY         |                    |                    |                |
 
+----------------------+--------------------+--------------------+----------------+
 CBC with Differential (2015  1:40 PM PST)
 
+-------------+-------+-----------------+-------------+--------------+
| Component   | Value | Ref Range       | Performed   | Pathologist  |
|             |       |                 | At          | Signature    |
+-------------+-------+-----------------+-------------+--------------+
| White Blood | 9.3   | 4.0 - 11.0 K/uL | [facility]  |              |
|  Cells      |       |                 | [facility]    |              |
|             |       |                 | MEDICAL     |              |
|             |       |                 | CENTER -    |              |
|             |       |                 | LABORATORY  |              |
+-------------+-------+-----------------+-------------+--------------+
| Red Blood   | 4.39  | 3.70 - 5.20     | [facility]  |              |
| Cells       |       | M/uL            | [facility]    |              |
|             |       |                 | MEDICAL     |              |
|             |       |                 | CENTER -    |              |
|             |       |                 | LABORATORY  |              |
+-------------+-------+-----------------+-------------+--------------+
| Hemoglobin  | 13.8  | 11.5 - 16.0     | [facility]  |              |
|             |       | g/dL            | [facility]    |              |
|             |       |                 | MEDICAL     |              |
|             |       |                 | CENTER -    |              |
|             |       |                 | LABORATORY  |              |
+-------------+-------+-----------------+-------------+--------------+
| Hematocrit  | 39.8  | 34.0 - 47.0 %   | [facility]  |              |
|             |       |                 | [facility]    |              |
|             |       |                 | MEDICAL     |              |
|             |       |                 | CENTER -    |              |
|             |       |                 | LABORATORY  |              |
+-------------+-------+-----------------+-------------+--------------+
| MCV         | 90.7  | 83.0 - 101.0 fL | [facility]  |              |
|             |       |                 | [facility]    |              |
|             |       |                 | MEDICAL     |              |
|             |       |                 | CENTER -    |              |
|             |       |                 | LABORATORY  |              |
+-------------+-------+-----------------+-------------+--------------+
| MCH         | 31.4  | 28.0 - 35.0 pg  | [facility]  |              |
|             |       |                 | [facility]    |              |
|             |       |                 | MEDICAL     |              |
|             |       |                 | CENTER -    |              |
|             |       |                 | LABORATORY  |              |
+-------------+-------+-----------------+-------------+--------------+
| MCHC        | 34.7  | 32.0 - 36.0     | [facility]  |              |
|             |       | g/dL            | [facility]    |              |
|             |       |                 | MEDICAL     |              |
|             |       |                 | CENTER -    |              |
|             |       |                 | LABORATORY  |              |
+-------------+-------+-----------------+-------------+--------------+
| RDW-CV      | 13.1  | <15.0 %         | [facility]  |              |
|             |       |                 | [facility]    |              |
|             |       |                 | MEDICAL     |              |
|             |       |                 | CENTER -    |              |
|             |       |                 | LABORATORY  |              |
+-------------+-------+-----------------+-------------+--------------+
| Platelet    | 325   | 140 - 440 K/uL  | [facility]  |              |
| Count       |       |                 | [facility]    |              |
|             |       |                 | MEDICAL     |              |
|             |       |                 | CENTER -    |              |
|             |       |                 | LABORATORY  |              |
 
+-------------+-------+-----------------+-------------+--------------+
| MPV         | 7.2   | fL              | [facility]  |              |
|             |       |                 | [facility]    |              |
|             |       |                 | MEDICAL     |              |
|             |       |                 | CENTER -    |              |
|             |       |                 | LABORATORY  |              |
+-------------+-------+-----------------+-------------+--------------+
| %           | 69.0  | 45.0 - 82.0 %   | [facility]  |              |
| Neutrophils |       |                 | [facility]    |              |
|             |       |                 | MEDICAL     |              |
|             |       |                 | CENTER -    |              |
|             |       |                 | LABORATORY  |              |
+-------------+-------+-----------------+-------------+--------------+
| %           | 22.6  | 20.0 - 45.0 %   | [facility]  |              |
| Lymphocytes |       |                 | [facility]    |              |
|             |       |                 | MEDICAL     |              |
|             |       |                 | CENTER -    |              |
|             |       |                 | LABORATORY  |              |
+-------------+-------+-----------------+-------------+--------------+
| % Monocytes | 5.8   | 4.0 - 12.0 %    | [facility]  |              |
|             |       |                 | [facility]    |              |
|             |       |                 | MEDICAL     |              |
|             |       |                 | CENTER -    |              |
|             |       |                 | LABORATORY  |              |
+-------------+-------+-----------------+-------------+--------------+
| %           | 1.7   | 0.0 - 5.0 %     | [facility]  |              |
| Eosinophils |       |                 | [facility]    |              |
|             |       |                 | MEDICAL     |              |
|             |       |                 | CENTER -    |              |
|             |       |                 | LABORATORY  |              |
+-------------+-------+-----------------+-------------+--------------+
| % Basophils | 0.9   | 0.0 - 1.0 %     | [facility]  |              |
|             |       |                 | [facility]    |              |
|             |       |                 | MEDICAL     |              |
|             |       |                 | CENTER -    |              |
|             |       |                 | LABORATORY  |              |
+-------------+-------+-----------------+-------------+--------------+
| Absolute    | 6.40  | 1.80 - 8.50     | [facility]  |              |
| Neutrophils |       | K/uL            | [facility]    |              |
|             |       |                 | MEDICAL     |              |
|             |       |                 | CENTER -    |              |
|             |       |                 | LABORATORY  |              |
+-------------+-------+-----------------+-------------+--------------+
| Absolute    | 2.10  | 0.60 - 3.20     | [facility]  |              |
| Lymphocytes |       | K/uL            | [facility]    |              |
|             |       |                 | MEDICAL     |              |
|             |       |                 | CENTER -    |              |
|             |       |                 | LABORATORY  |              |
+-------------+-------+-----------------+-------------+--------------+
| Absolute    | 0.50  | 0.00 - 1.00     | [facility]  |              |
| Monocytes   |       | K/uL            | [facility]    |              |
|             |       |                 | MEDICAL     |              |
|             |       |                 | CENTER -    |              |
|             |       |                 | LABORATORY  |              |
+-------------+-------+-----------------+-------------+--------------+
| Absolute    | 0.20  | 0.00 - 0.40     | [facility]  |              |
| Eosinophils |       | K/uL            | [facility]    |              |
|             |       |                 | MEDICAL     |              |
|             |       |                 | CENTER -    |              |
|             |       |                 | LABORATORY  |              |
 
+-------------+-------+-----------------+-------------+--------------+
| Absolute    | 0.10  | 0.00 - 0.10     | [facility]  |              |
| Basophils   |       | K/uL            | [facility]    |              |
|             |       |                 | MEDICAL     |              |
|             |       |                 | CENTER -    |              |
|             |       |                 | LABORATORY  |              |
+-------------+-------+-----------------+-------------+--------------+
 
 
 
+----------------------+
| Specimen             |
+----------------------+
| Blood - Right upper  |
| arm structure (body  |
| structure)           |
+----------------------+
 
 
 
+----------------------+--------------------+--------------------+----------------+
| Performing           | Address            | City/State/Zipcode | Phone Number   |
| Organization         |                    |                    |                |
+----------------------+--------------------+--------------------+----------------+
|   [facility]     |   [address] |   [place]  |   [phone] |
| [facility]  |                    | [id number]              |                |
| - LABORATORY         |                    |                    |                |
+----------------------+--------------------+--------------------+----------------+
|   [facility]     |   [address] |   [place]  |                |
| [facility]  |                    | [address], [place]         |                |
| - LABORATORY         |                    |                    |                |
+----------------------+--------------------+--------------------+----------------+
 documented in this encounter
 
 Visit Diagnoses
 
 
+--------------------------------------------------+
| Diagnosis                                        |
+--------------------------------------------------+
|   Cough - Primary                                |
+--------------------------------------------------+
|   RML pneumonia  Pneumonia, organism unspecified |
+--------------------------------------------------+
|   Asthma, mild intermittent, uncomplicated       |
+--------------------------------------------------+
 documented in this encounter

## 2020-09-28 NOTE — XMS
Encounter Summary
  Created on: 2020
 
 Marie Fermin
 External Reference #: [id number]
 : [birth year]
 Sex: Female
 
 Demographics
 
 
+-----------------------+------------------------+
| Address               | [address]          |
|                       | [place]  [address]      |
+-----------------------+------------------------+
| Home Phone            | [phone]        |
+-----------------------+------------------------+
| Preferred Language    | Unknown                |
+-----------------------+------------------------+
| Marital Status        |                 |
+-----------------------+------------------------+
| Temple Affiliation | Unknown                |
+-----------------------+------------------------+
| Race                  | White                  |
+-----------------------+------------------------+
| Ethnic Group          | Not  or  |
+-----------------------+------------------------+
 
 
 Author
 
 
+--------------+--------------------------------------------+
| Author       | [facility]  |
|              | and [place]                                |
+--------------+--------------------------------------------+
| Organization | [facility]  |
|              | and [place]                                |
+--------------+--------------------------------------------+
| Address      | Unknown                                    |
+--------------+--------------------------------------------+
| Phone        | Unavailable                                |
+--------------+--------------------------------------------+
 
 
 
 Support
 
 
+-----------------+--------------+---------+-----------------+
| Name            | Relationship | Address | Phone           |
+-----------------+--------------+---------+-----------------+
| Rodríguez Fermin | ECON         | Unknown | [phone] |
+-----------------+--------------+---------+-----------------+
 
 
 
 Care Team Providers
 
 
 
+-----------------------+------+-------------+
| Care Team Member Name | Role | Phone       |
+-----------------------+------+-------------+
| No, Physician         | PCP  | Unavailable |
+-----------------------+------+-------------+
 
 
 
 Reason for Visit
 
 
+-----------+---------------------------------------------------+
| Reason    | Comments                                          |
+-----------+---------------------------------------------------+
| Follow-up | RM 3- follow up on back pain, needs note for work |
+-----------+---------------------------------------------------+
 
 
 
 Encounter Details
 
 
+--------+---------+----------------------+----------------------+---------------------+
| Date   | Type    | Department           | Care Team            | Description         |
+--------+---------+----------------------+----------------------+---------------------+
| 04/15/ | Office  |   [facility]   |   Homer Garrison,  | Low back pain       |
|    | Visit   | CARE  [address] | MD  [address]   | radiating down leg  |
|        |         |   [place]    | [place]      | (Primary Dx)        |
|        |         | [id number]           | [id number]  [phone]  |                     |
|        |         | [phone]         |  [phone] (Fax)  |                     |
+--------+---------+----------------------+----------------------+---------------------+
 
 
 
 Social History
 
 
+--------------------+-------+-----------+--------+------+
| Tobacco Use        | Types | Packs/Day | Years  | Date |
|                    |       |           | Used   |      |
+--------------------+-------+-----------+--------+------+
| Current Every Day  |       | 1         |        |      |
| Smoker             |       |           |        |      |
+--------------------+-------+-----------+--------+------+
 
 
 
+---------------------+---+---+---+
| Smokeless Tobacco:  |   |   |   |
| Never Used          |   |   |   |
+---------------------+---+---+---+
 
 
 
+-------------+-------------+---------+----------+
| Alcohol Use | Drinks/Week | oz/Week | Comments |
+-------------+-------------+---------+----------+
| Not Asked   |             |         |          |
 
+-------------+-------------+---------+----------+
 
 
 
+------------------+---------------+
| Sex Assigned at  | Date Recorded |
| Birth            |               |
+------------------+---------------+
| Not on file      |               |
+------------------+---------------+
 documented as of this encounter
 
 Last Filed Vital Signs
 
 
+-------------------+----------------------+----------------------+----------+
| Vital Sign        | Reading              | Time Taken           | Comments |
+-------------------+----------------------+----------------------+----------+
| Blood Pressure    | 124/65               | 04/15/2016  9:03 AM  |          |
|                   |                      | PDT                  |          |
+-------------------+----------------------+----------------------+----------+
| Pulse             | 81                   | 04/15/2016  9:03 AM  |          |
|                   |                      | PDT                  |          |
+-------------------+----------------------+----------------------+----------+
| Temperature       | 36.6   C (97.8   F)  | 04/15/2016  9:03 AM  |          |
|                   |                      | PDT                  |          |
+-------------------+----------------------+----------------------+----------+
| Respiratory Rate  | 18                   | 04/15/2016  9:03 AM  |          |
|                   |                      | PDT                  |          |
+-------------------+----------------------+----------------------+----------+
| Oxygen Saturation | 99%                  | 04/15/2016  9:03 AM  |          |
|                   |                      | PDT                  |          |
+-------------------+----------------------+----------------------+----------+
| Inhaled Oxygen    | -                    | -                    |          |
| Concentration     |                      |                      |          |
+-------------------+----------------------+----------------------+----------+
| Weight            | 102 kg (224 lb 14.4  | 04/15/2016  9:03 AM  |          |
|                   | oz)                  | PDT                  |          |
+-------------------+----------------------+----------------------+----------+
| Height            | 162.6 cm (5' 4")     | 04/15/2016  9:03 AM  |          |
|                   |                      | PDT                  |          |
+-------------------+----------------------+----------------------+----------+
| Body Mass Index   | 38.6                 | 04/15/2016  9:03 AM  |          |
|                   |                      | PDT                  |          |
+-------------------+----------------------+----------------------+----------+
 documented in this encounter
 
 Patient Instructions
 Patient Instructions Homer Garrison MD - 04/15/2016  9:19 AM PDTTake the next 5 days off
 work.  Continue the medications as you have been doing.  Recheck here in 4 days if no kateryna
r.  At that point I think you would need to have an MRI.  If you are worsening before that t
hen recheck either here or in the ER. Electronically signed by Homer Garrison MD at 04/15/
2016  9:27 AM PDT
 documented in this encounter
 
 Progress Notes
 Homer Garrison MD - 04/15/2016  9:29 AM PDTFormatting of this note might be different fr
om the original.
 Subjective: 
  
 
 Patient ID: Marie Fermin is a 40 y.o. female.
 
 HPI
 Patient's medications, allergies, past medical, surgical, social and family histories were 
obtained and reviewed as appropriate.
 This lady is here in follow-up for low back pain.  She was seen here 4 days ago by Dr. Ed moe and diagnosed with low back pain and started on treatment with tramadol and Flexeril a
nd ibuprofen is being use also.  She says she has had the pain for about 10 days.  She has n
ot had previous problem like this.  The pain is across the low back but now has started to r
adiate down the right leg to the foot.  There is a little bit of a numb or tight feeling in 
the thigh and her low back feels almost like back labor she says.  She has no bowel or bladd
er symptoms.  She had x-rays the other day which apparently were essentially unremarkable.  
She also had CBC and chem panel and sed rate all of which looked quite good.  She also had a
n ultrasound ordered and got that done but is waiting for the results.  She is here today ma
umang to get a note for work.  She works at [employer] and can't really do her job with these sy
mptoms because it involves some physical demands.  Her pain seems a little atypical for norm
al mechanical back pain so I've asked her some other questions.  She denies fever, nausea or
 vomiting, diarrhea, constipation, rectal bleeding, dysuria, or vaginal bleeding or discharg
e.  She had no other complaint.  I reviewed her past history and meds.
 Review of Systems 
 Constitutional: Negative.  
 HENT: Negative.  
 Respiratory: Negative.  
 Cardiovascular: Negative.  
 Gastrointestinal: Negative.  
 Genitourinary: Negative.  
 Musculoskeletal: Positive for back pain. 
 
 Objective: 
 Physical Exam 
 Constitutional: She is oriented to person, place, and time. She appears well-developed and 
well-nourished. No distress. 
 HENT: 
 Head: Normocephalic. 
 Cardiovascular: Normal rate, regular rhythm and normal heart sounds.  
 Pulmonary/Chest: Effort normal and breath sounds normal. 
 Musculoskeletal: 
      Back:
 
 +1 patellar reflexes bilaterally.  She can achieve essentially normal range of motion of th
e low back but does have discomfort with doing this. 
 Neurological: She is alert and oriented to person, place, and time. 
 Vitals reviewed.
 
 Assessment: 
 
 1. Low back pain radiating down leg   
 
 Plan: 
 
 Please see the AVS for the plan unless outlined elsewhere.
 
 I discussed situation with her.  I will give her a note to excuse her from work from now th
rough April 19 for 5 days off.  She is to continue her present medications.  She is still wa
iting for the report on the ultrasound.  She is to recheck here in 4 days if no better.  If 
she is worsening before that she should recheck either here or the ER.  I specifically discu
ssed cauda equina symptoms and told her to recheck immediately if these occur.  The progress
ion of pain going down the right leg now there is more suggestion for a neurologic involveme
nt such as compression on the nerve root and if she is not better in 4 days and returns I to
ld her I think she probably should have an MRI ordered at that point.  She had no questions.
 
 
 Electronically signed by Homer Garrison MD at 04/15/2016  9:38 AM PDTdocumented in this e
ncounter
 
 Plan of Treatment
 Not on filedocumented as of this encounter
 
 Visit Diagnoses
 
 
+----------------------------------------------+
| Diagnosis                                    |
+----------------------------------------------+
|   Low back pain radiating down leg - Primary |
+----------------------------------------------+
 documented in this encounter

## 2020-09-28 NOTE — XMS
Encounter Summary
  Created on: 2020
 
 Marie Fermin
 External Reference #: [id number]
 : [birth year]
 Sex: Female
 
 Demographics
 
 
+-----------------------+------------------------+
| Address               | [address]          |
|                       | [place]  [address]      |
+-----------------------+------------------------+
| Home Phone            | [phone]        |
+-----------------------+------------------------+
| Preferred Language    | Unknown                |
+-----------------------+------------------------+
| Marital Status        |                 |
+-----------------------+------------------------+
| Scientology Affiliation | Unknown                |
+-----------------------+------------------------+
| Race                  | White                  |
+-----------------------+------------------------+
| Ethnic Group          | Not  or  |
+-----------------------+------------------------+
 
 
 Author
 
 
+--------------+--------------------------------------------+
| Author       | [facility]  |
|              | and [place]                                |
+--------------+--------------------------------------------+
| Organization | [facility]  |
|              | and [place]                                |
+--------------+--------------------------------------------+
| Address      | Unknown                                    |
+--------------+--------------------------------------------+
| Phone        | Unavailable                                |
+--------------+--------------------------------------------+
 
 
 
 Support
 
 
+-----------------+--------------+---------+-----------------+
| Name            | Relationship | Address | Phone           |
+-----------------+--------------+---------+-----------------+
| Rodríguez Fermin | ECON         | Unknown | [phone] |
+-----------------+--------------+---------+-----------------+
 
 
 
 Care Team Providers
 
 
 
+-----------------------+------+-------------+
| Care Team Member Name | Role | Phone       |
+-----------------------+------+-------------+
| No, Physician         | PCP  | Unavailable |
+-----------------------+------+-------------+
 
 
 
 Reason for Referral
 Evaluate & Treat (Routine)
 
+--------+--------------+---------------+--------------+--------------+--------------+
| Status | Reason       | Specialty     | Diagnoses /  | Referred By  | Referred To  |
|        |              |               | Procedures   | Contact      | Contact      |
+--------+--------------+---------------+--------------+--------------+--------------+
| Closed |   Specialty  | Chiropractic  |   Diagnoses  |   [facility]    |              |
|        | Services     | Medicine      |  Cervical    | Cathi     |              |
|        | Required     |               | strain,      | MD [facility]      |              |
|        |              |               | subsequent   | [address]       |              |
|        |              |               | encounter    | [address]   |              |
|        |              |               |              | [place], |              |
|        |              |               |              |  [place] [address]    |              |
|        |              |               |              | Phone:       |              |
|        |              |               |              | [phone] |              |
|        |              |               |              |   Fax:       |              |
|        |              |               |              | [phone] |              |
+--------+--------------+---------------+--------------+--------------+--------------+
 
 
 
+----------------------------------------------------------------------+
| Scheduling Instructions                                              |
+----------------------------------------------------------------------+
|   noheliaal & treat  6-12 sessions  [facility]- [facility] |
+----------------------------------------------------------------------+
 
 
 
 
 Reason for Visit
 
 
+-----------+-------------------------------------------------+
| Reason    | Comments                                        |
+-----------+-------------------------------------------------+
| Follow-up | Multiple injury follow up for neck and shoulder |
+-----------+-------------------------------------------------+
 
 
 
 Encounter Details
 
 
+--------+---------+----------------------+---------------------+----------------------+
| Date   | Type    | Department           | Care Team           | Description          |
+--------+---------+----------------------+---------------------+----------------------+
| / | Office  |   [facility]          |   Cathi Osorio  | Cervical strain,     |
|    | Visit   | [facility]  | MD Becky  [address]     | subsequent encounter |
 
|        |         | [address]    | [address]  [place]   |  (Primary Dx); Place |
|        |         | [address]  [place] | [place] [address]     |  of occurrence,      |
|        |         |  [place], [place]           | [phone]        | industrial places    |
|        |         | [address]           | [phone] (Fax)  | and premises;        |
|        |         | [phone]         |                     | Civilian activity    |
|        |         |                      |                     | done for income or   |
|        |         |                      |                     | pay                  |
+--------+---------+----------------------+---------------------+----------------------+
 
 
 
 Social History
 
 
+--------------------+-------+-----------+--------+------+
| Tobacco Use        | Types | Packs/Day | Years  | Date |
|                    |       |           | Used   |      |
+--------------------+-------+-----------+--------+------+
| Current Every Day  |       | 1         |        |      |
| Smoker             |       |           |        |      |
+--------------------+-------+-----------+--------+------+
 
 
 
+---------------------+---+---+---+
| Smokeless Tobacco:  |   |   |   |
| Never Used          |   |   |   |
+---------------------+---+---+---+
 
 
 
+-------------+-------------+---------+----------+
| Alcohol Use | Drinks/Week | oz/Week | Comments |
+-------------+-------------+---------+----------+
| Not Asked   |             |         |          |
+-------------+-------------+---------+----------+
 
 
 
+------------------+---------------+
| Sex Assigned at  | Date Recorded |
| Birth            |               |
+------------------+---------------+
| Not on file      |               |
+------------------+---------------+
 documented as of this encounter
 
 Last Filed Vital Signs
 
 
+-------------------+----------------------+----------------------+----------+
| Vital Sign        | Reading              | Time Taken           | Comments |
+-------------------+----------------------+----------------------+----------+
| Blood Pressure    | 108/80               | 2014  3:01 PM  |          |
|                   |                      | PST                  |          |
+-------------------+----------------------+----------------------+----------+
| Pulse             | 84                   | 2014  3:01 PM  |          |
|                   |                      | PST                  |          |
+-------------------+----------------------+----------------------+----------+
| Temperature       | 36.4   C (97.6   F)  | 2014  3:01 PM  |          |
 
|                   |                      | PST                  |          |
+-------------------+----------------------+----------------------+----------+
| Respiratory Rate  | 16                   | 2014  3:01 PM  |          |
|                   |                      | PST                  |          |
+-------------------+----------------------+----------------------+----------+
| Oxygen Saturation | -                    | -                    |          |
+-------------------+----------------------+----------------------+----------+
| Inhaled Oxygen    | -                    | -                    |          |
| Concentration     |                      |                      |          |
+-------------------+----------------------+----------------------+----------+
| Weight            | 103.7 kg (228 lb 9.6 | 2014  3:01 PM  |          |
|                   |  oz)                 | PST                  |          |
+-------------------+----------------------+----------------------+----------+
| Height            | 165.1 cm (5' 5")     | 2014  3:01 PM  |          |
|                   |                      | PST                  |          |
+-------------------+----------------------+----------------------+----------+
| Body Mass Index   | 38.04                | 2014  3:01 PM  |          |
|                   |                      | PST                  |          |
+-------------------+----------------------+----------------------+----------+
 documented in this encounter
 
 Progress Notes
 Cathi Osorio MD - 2014 11:27 AM PSTEmployer: [employer]
 Guarantor: [facility]
 Date of injury: 14
 Claim number: [id number]
 
 Chief complaint: Followup neck strain
 
 Subjective:
 Patient is a 39-year-old female presents today for scheduled followup.  Since her last visi
t she has had resolve of all of her symptoms except she continues to have mild headaches.  P
ain starts in the posterior neck, radiates up and feels as if it is in circling her head in 
a vice.  Overall her headaches have lessened in intensity and frequency but still persist.  
They're not interfering with an irregularly activities and she has been at her regular job w
ithout difficulty.  She did try muscle relaxer, found to be helpful.  On 87 she use the musc
le relaxer she awoke pain-free, but also states when she doesn't take the medication she sti
ll has several days and she awakes pain-free so is not sure if it is the medication is helpi
ng her or if she sustained better on her own.  She reports no new symptoms or complaints or 
reports of new injury.  She has sought chiropractic manipulation in the past for a variety o
f musculoskeletal complaints and found it to be very helpful.  She states when she popped he
r back or her neck herself she finds that this helps with her headaches and thinks that chir
opractic manipulation may be very helpful for her.
 
 Past medical history, medications, allergies reviewed
 
 Review of systems:
 As per HPI
 
 Objective:
 Vital signs as noted, nursing notes reviewed.
 General-well-developed, well-nourished, in no apparent distress, pleasant cooperative.
 Neck-normal to inspection.  No paraspinous muscle spasticity.  No vertebral point tendernes
s, crepitus, or step-off.  No tender points with palpation.  Full range of motion without pa
in or limitation.  Negative Spurling testing bilaterally.
 Neuro-Motor strength 5/5 bilat upper extremities
 DTR's biceps,triceps, brachioradialis 2+/4 bilat
 Sensation grossly intact to light touch bilat upper extremities
 
 Imaging/diagnostics:
 
 None indicated this visit
 
 Pending interventions:
 Continue conservative management.  Chiropractic referral made.
 
 Assessment:
 1.  Cervical strain-with secondary muscle tension headaches
 
 Plan:
 Continue conservative management.  Referred for chiropractic manipulation in the hopes that
 this will decrease her headaches.  Followup in 3-4 weeks to recheck, returning sooner for a
cute worsening or other concerns.  Patient voices understanding and agreement.
 
 E: Regular duty
 
 R: None
 
 Impairment undetermined at this point in time, based on current objective findings it is no
t an anticipated consequence of this injury however patient has not yet reached MMI status.
 
 This note was dictated using dragon voice recognition software.  Occasional wrong- word or 
sound-alike substitutions may have occurred due to the inherent limitations of voice recogni
tion software.  Please read the chart carefully and recognize, using context, where these scott
bstitutions have occurred. 
 
 Electronically signed by Cathi Osorio MD at 2014 11:33 AM PSTdocumented in th
is encounter
 
 Miscellaneous Notes
 Plan of Care - ONBASE SCAN WAMT - 2014 12:00 AM PSTElectronically signed by ROLADN Rabago at 2014  1:07 PM PSTdocumented in this encounter
 
 Plan of Treatment
 
 
+-----------------+-------------+--------+----------------------+---------------------+
| Name            | Type        | Priori | Associated Diagnoses | Order Schedule      |
|                 |             | ty     |                      |                     |
+-----------------+-------------+--------+----------------------+---------------------+
| Chiropractic,   | Outpatient  | Routin |   Cervical strain,   | Ordered: 2014 |
| External - AMB  | Referral    | e      | subsequent encounter |                     |
| Referral        |             |        |                      |                     |
+-----------------+-------------+--------+----------------------+---------------------+
 documented as of this encounter
 
 Visit Diagnoses
 
 
+-------------------------------------------------------+
| Diagnosis                                             |
+-------------------------------------------------------+
|   Cervical strain, subsequent encounter - Primary     |
+-------------------------------------------------------+
|   Place of occurrence, industrial places and premises |
+-------------------------------------------------------+
|   Civilian activity done for income or pay            |
+-------------------------------------------------------+
 documented in this encounter

## 2020-09-28 NOTE — XMS
Encounter Summary
  Created on: 2020
 
 Marie Fermin
 External Reference #: [id number]
 : [birth year]
 Sex: Female
 
 Demographics
 
 
+-----------------------+------------------------+
| Address               | [address]          |
|                       | [place]  [address]      |
+-----------------------+------------------------+
| Home Phone            | [phone]        |
+-----------------------+------------------------+
| Preferred Language    | Unknown                |
+-----------------------+------------------------+
| Marital Status        |                 |
+-----------------------+------------------------+
| Taoism Affiliation | Unknown                |
+-----------------------+------------------------+
| Race                  | White                  |
+-----------------------+------------------------+
| Ethnic Group          | Not  or  |
+-----------------------+------------------------+
 
 
 Author
 
 
+--------------+--------------------------------------------+
| Author       | [facility]  |
|              | and [place]                                |
+--------------+--------------------------------------------+
| Organization | [facility]  |
|              | and [place]                                |
+--------------+--------------------------------------------+
| Address      | Unknown                                    |
+--------------+--------------------------------------------+
| Phone        | Unavailable                                |
+--------------+--------------------------------------------+
 
 
 
 Support
 
 
+-----------------+--------------+---------+-----------------+
| Name            | Relationship | Address | Phone           |
+-----------------+--------------+---------+-----------------+
| Rodríguez Fermin | ECON         | Unknown | [phone] |
+-----------------+--------------+---------+-----------------+
 
 
 
 Care Team Providers
 
 
 
+-----------------------+------+-------------+
| Care Team Member Name | Role | Phone       |
+-----------------------+------+-------------+
 PCP  | Unavailable |
+-----------------------+------+-------------+
 
 
 
 Encounter Details
 
 
+--------+-----------+----------------------+-----------+-------------+
| Date   | Type      | Department           | Care Team | Description |
+--------+-----------+----------------------+-----------+-------------+
| / | Hospital  |   [facility] |           |             |
|    | Encounter |  [facility]   |           |             |
|        |           | [address] |           |             |
|        |           |   [place]    |           |             |
|        |           | [id number]           |           |             |
|        |           | [phone]         |           |             |
+--------+-----------+----------------------+-----------+-------------+
 
 
 
 Social History
 
 
+----------------+-------+-----------+--------+------+
| Tobacco Use    | Types | Packs/Day | Years  | Date |
|                |       |           | Used   |      |
+----------------+-------+-----------+--------+------+
| Never Assessed |       |           |        |      |
+----------------+-------+-----------+--------+------+
 
 
 
+------------------+---------------+
| Sex Assigned at  | Date Recorded |
| Birth            |               |
+------------------+---------------+
| Not on file      |               |
+------------------+---------------+
 documented as of this encounter
 
 Plan of Treatment
 Not on filedocumented as of this encounter
 
 Visit Diagnoses
 Not on filedocumented in this encounter

## 2020-09-28 NOTE — XMS
Encounter Summary
  Created on: 2020
 
 Marie Fermin
 External Reference #: [id number]
 : [birth year]
 Sex: Female
 
 Demographics
 
 
+-----------------------+------------------------+
| Address               | [address]          |
|                       | [place]  [address]      |
+-----------------------+------------------------+
| Home Phone            | [phone]        |
+-----------------------+------------------------+
| Preferred Language    | Unknown                |
+-----------------------+------------------------+
| Marital Status        |                 |
+-----------------------+------------------------+
| Church Affiliation | Unknown                |
+-----------------------+------------------------+
| Race                  | White                  |
+-----------------------+------------------------+
| Ethnic Group          | Not  or  |
+-----------------------+------------------------+
 
 
 Author
 
 
+--------------+--------------------------------------------+
| Author       | [facility]  |
|              | and [place]                                |
+--------------+--------------------------------------------+
| Organization | [facility]  |
|              | and [place]                                |
+--------------+--------------------------------------------+
| Address      | Unknown                                    |
+--------------+--------------------------------------------+
| Phone        | Unavailable                                |
+--------------+--------------------------------------------+
 
 
 
 Support
 
 
+-----------------+--------------+---------+-----------------+
| Name            | Relationship | Address | Phone           |
+-----------------+--------------+---------+-----------------+
| Rodríguez Fermin | ECON         | Unknown | [phone] |
+-----------------+--------------+---------+-----------------+
 
 
 
 Care Team Providers
 
 
 
+-----------------------+------+-------------+
| Care Team Member Name | Role | Phone       |
+-----------------------+------+-------------+
| No, Physician         | PCP  | Unavailable |
+-----------------------+------+-------------+
 
 
 
 Reason for Visit
 
 
+-----------+-----------------------------------------+
| Reason    | Comments                                |
+-----------+-----------------------------------------+
| Follow-up | room [facility]/  inj, 9.14, neck/shoulders |
+-----------+-----------------------------------------+
 
 
 
 Encounter Details
 
 
+--------+---------+----------------------+---------------------+----------------------+
| Date   | Type    | Department           | Care Team           | Description          |
+--------+---------+----------------------+---------------------+----------------------+
| / | Office  |   [facility]   |   Yary eJff,  | Neck strain,         |
|    | Visit   | CARE  [address] | MD  Need updated    | subsequent encounter |
|        |         |   [place]    | address             |  (Primary Dx)        |
|        |         | [address]           |                     |                      |
|        |         | [phone]         |                     |                      |
+--------+---------+----------------------+---------------------+----------------------+
 
 
 
 Social History
 
 
+--------------------+-------+-----------+--------+------+
| Tobacco Use        | Types | Packs/Day | Years  | Date |
|                    |       |           | Used   |      |
+--------------------+-------+-----------+--------+------+
| Current Every Day  |       | 1         |        |      |
| Smoker             |       |           |        |      |
+--------------------+-------+-----------+--------+------+
 
 
 
+---------------------+---+---+---+
| Smokeless Tobacco:  |   |   |   |
| Never Used          |   |   |   |
+---------------------+---+---+---+
 
 
 
+-------------+-------------+---------+----------+
| Alcohol Use | Drinks/Week | oz/Week | Comments |
+-------------+-------------+---------+----------+
| Not Asked   |             |         |          |
 
+-------------+-------------+---------+----------+
 
 
 
+------------------+---------------+
| Sex Assigned at  | Date Recorded |
| Birth            |               |
+------------------+---------------+
| Not on file      |               |
+------------------+---------------+
 documented as of this encounter
 
 Last Filed Vital Signs
 
 
+-------------------+---------------------+----------------------+----------+
| Vital Sign        | Reading             | Time Taken           | Comments |
+-------------------+---------------------+----------------------+----------+
| Blood Pressure    | 120/74              | 2014  3:40 PM  |          |
|                   |                     | PDT                  |          |
+-------------------+---------------------+----------------------+----------+
| Pulse             | 71                  | 2014  3:40 PM  |          |
|                   |                     | PDT                  |          |
+-------------------+---------------------+----------------------+----------+
| Temperature       | 37.2   C (99   F)   | 2014  3:40 PM  |          |
|                   |                     | PDT                  |          |
+-------------------+---------------------+----------------------+----------+
| Respiratory Rate  | 12                  | 2014  3:40 PM  |          |
|                   |                     | PDT                  |          |
+-------------------+---------------------+----------------------+----------+
| Oxygen Saturation | 99%                 | 2014  3:40 PM  |          |
|                   |                     | PDT                  |          |
+-------------------+---------------------+----------------------+----------+
| Inhaled Oxygen    | -                   | -                    |          |
| Concentration     |                     |                      |          |
+-------------------+---------------------+----------------------+----------+
| Weight            | 104.1 kg (229 lb 8  | 2014  3:40 PM  |          |
|                   | oz)                 | PDT                  |          |
+-------------------+---------------------+----------------------+----------+
| Height            | -                   | -                    |          |
+-------------------+---------------------+----------------------+----------+
| Body Mass Index   | 38.19               | 2014 12:19 PM  |          |
|                   |                     | PDT                  |          |
+-------------------+---------------------+----------------------+----------+
 documented in this encounter
 
 Progress Notes
 Yary Jeff MD - 2014  4:22 PM PDTFormatting of this note might be different fro
m the original.
 Subjective: 
  
 Chief Complaint: Follow-up
  
 
 Marie is a 39 y.o. female who comes in for follow up of neck pain and h/a after slip injury
.   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  She tried changing tags and that seemed
 fine but lifting is still really hurting.  No other complaints.
 
 Patient's medications, allergies, past medical, surgical, social and family histories were 
reviewed and updated as appropriate.
 
 
  
 Objective: 
 
 /74 | Pulse 71 | Temp 37.2 C (99 F) (Temporal) | Resp 12 | Wt 104.101 kg (229 lb 
8 oz) | SpO2 99%
 
 General Appearance:  Alert, cooperative, no distress, appears stated age
 Cervical spine nontender
 Tight paraspinous mm and ttp at occiput on R
 Flexion limited somewhat and ext normal.
 Looking lat 75 deg bilat
 Good rom arms  strength 5/5 bilat
 Mm spasm upper shoulder and base neck on R
 
 Assessment and Plans: 
 
 Neck strain
 Continue light duty order 1 wk and f/u w Dr. Osorio
 Heat and stretching after work
 No overhead work and no lifting over 5 lbs.
 
 Electronically signed by Yary Jeff MD at 10/01/2014  9:33 AM PDTdocumented in this en
counter
 
 Miscellaneous Notes
 Plan of Care - ONBASE SCAN [facility] - 2014 12:00 AM PDTElectronically signed by ROLDAN Rabago at 10/13/2014 11:06 AM PDTPlan of [facility] - 2014 12:00 AM PDTElect
ronically signed by Anoop Rabago at 10/06/2014  3:48 PM PDTdocumented in this encounter
 
 Plan of Treatment
 Not on filedocumented as of this encounter
 
 Visit Diagnoses
 
 
+-----------------------------------------------+
| Diagnosis                                     |
+-----------------------------------------------+
|   Neck strain, subsequent encounter - Primary |
+-----------------------------------------------+
 documented in this encounter

## 2020-09-28 NOTE — XMS
Encounter Summary
  Created on: 2020
 
 Marie Fermin
 External Reference #: [id number]
 : [birth year]
 Sex: Female
 
 Demographics
 
 
+-----------------------+------------------------+
| Address               | [address]          |
|                       | [place]  [address]      |
+-----------------------+------------------------+
| Home Phone            | [phone]        |
+-----------------------+------------------------+
| Preferred Language    | Unknown                |
+-----------------------+------------------------+
| Marital Status        |                 |
+-----------------------+------------------------+
| Restoration Affiliation | Unknown                |
+-----------------------+------------------------+
| Race                  | White                  |
+-----------------------+------------------------+
| Ethnic Group          | Not  or  |
+-----------------------+------------------------+
 
 
 Author
 
 
+--------------+--------------------------------------------+
| Author       | [facility]  |
|              | and [place]                                |
+--------------+--------------------------------------------+
| Organization | [facility]  |
|              | and [place]                                |
+--------------+--------------------------------------------+
| Address      | Unknown                                    |
+--------------+--------------------------------------------+
| Phone        | Unavailable                                |
+--------------+--------------------------------------------+
 
 
 
 Support
 
 
+-----------------+--------------+---------+-----------------+
| Name            | Relationship | Address | Phone           |
+-----------------+--------------+---------+-----------------+
| Rodríguez Fermin | ECON         | Unknown | [phone] |
+-----------------+--------------+---------+-----------------+
 
 
 
 Care Team Providers
 
 
 
+-----------------------+------+-------------+
| Care Team Member Name | Role | Phone       |
+-----------------------+------+-------------+
 PCP  | Unavailable |
+-----------------------+------+-------------+
 
 
 
 Encounter Details
 
 
+--------+-----------+----------------------+-----------+-------------+
| Date   | Type      | Department           | Care Team | Description |
+--------+-----------+----------------------+-----------+-------------+
| / | Hospital  |   [facility] |           |             |
|  - | Encounter |  MED CTR GENERIC OP  |           |             |
|        |           | CONV DEPT  [address]     |           |             |
| / |           | [place], |           |             |
|    |           |  [place] [address]       |           |             |
|        |           | [phone]         |           |             |
+--------+-----------+----------------------+-----------+-------------+
 
 
 
 Social History
 
 
+----------------+-------+-----------+--------+------+
| Tobacco Use    | Types | Packs/Day | Years  | Date |
|                |       |           | Used   |      |
+----------------+-------+-----------+--------+------+
| Never Assessed |       |           |        |      |
+----------------+-------+-----------+--------+------+
 
 
 
+------------------+---------------+
| Sex Assigned at  | Date Recorded |
| Birth            |               |
+------------------+---------------+
| Not on file      |               |
+------------------+---------------+
 documented as of this encounter
 
 Plan of Treatment
 Not on filedocumented as of this encounter
 
 Visit Diagnoses
 Not on filedocumented in this encounter

## 2020-09-28 NOTE — XMS
Encounter Summary
  Created on: 2020
 
 Marie Fermin
 External Reference #: [id number]
 : [birth year]
 Sex: Female
 
 Demographics
 
 
+-----------------------+------------------------+
| Address               | [address]          |
|                       | [place]  [address]      |
+-----------------------+------------------------+
| Home Phone            | [phone]        |
+-----------------------+------------------------+
| Preferred Language    | Unknown                |
+-----------------------+------------------------+
| Marital Status        |                 |
+-----------------------+------------------------+
| Mandaen Affiliation | Unknown                |
+-----------------------+------------------------+
| Race                  | White                  |
+-----------------------+------------------------+
| Ethnic Group          | Not  or  |
+-----------------------+------------------------+
 
 
 Author
 
 
+--------------+--------------------------------------------+
| Author       | [facility]  |
|              | and [place]                                |
+--------------+--------------------------------------------+
| Organization | [facility]  |
|              | [facility] [place]                                |
+--------------+--------------------------------------------+
| Address      | Unknown                                    |
+--------------+--------------------------------------------+
| Phone        | Unavailable                                |
+--------------+--------------------------------------------+
 
 
 
 Support
 
 
+-----------------+--------------+---------+-----------------+
| Name            | Relationship | Address | Phone           |
+-----------------+--------------+---------+-----------------+
| Rodríguez Fermin | ECON         | Unknown | [phone] |
+-----------------+--------------+---------+-----------------+
 
 
 
 Care Team Providers
 
 
 
+-----------------------+------+-------------+
| Care Team Member Name | Role | Phone       |
+-----------------------+------+-------------+
| No, Physician         | PCP  | Unavailable |
+-----------------------+------+-------------+
 
 
 
 Reason for Visit
 Diagnostic/Screening (Routine)
 
+--------+--------+-----------+--------------+--------------+---------------+
| Status | Reason | Specialty | Diagnoses /  | Referred By  | Referred To   |
|        |        |           | Procedures   | Contact      | Contact       |
+--------+--------+-----------+--------------+--------------+---------------+
| Closed |        | Radiology |   Diagnoses  |   [place]      |   [facility]     |
|        |        |           |  Right-sided | Yary D,   | [address]  |
|        |        |           |  low back    | MD  Need     |  [place], |
|        |        |           | pain with    | updated      |  [place]           |
|        |        |           | right-sided  | address      | [address]    |
|        |        |           | sciatica,    |              | Phone:        |
|        |        |           | unspecified  |              | [phone]  |
|        |        |           | chronicity   |              |  Fax:         |
|        |        |           | Procedures   |              | [phone]  |
|        |        |           | MRI Lumbar   |              |               |
|        |        |           | Spine wo     |              |               |
|        |        |           | Contrast     |              |               |
+--------+--------+-----------+--------------+--------------+---------------+
 
 
 
 
 Encounter Details
 
 
+--------+-----------+----------------------+---------------------+------------------+
| Date   | Type      | Department           | Care Team           | Description      |
+--------+-----------+----------------------+---------------------+------------------+
| 06/10/ | Hospital  |   [facility] |   Yary Jeff,  | Canceled (OTHER) |
| 2016   | Encounter |  [facility]  [address]  | MD  Need updated    |                  |
|        |           | [place], | address             |                  |
|        |           |  [place] [address]       |                     |                  |
|        |           | [phone]         |                     |                  |
+--------+-----------+----------------------+---------------------+------------------+
 
 
 
 Social History
 
 
+--------------------+-------+-----------+--------+------+
| Tobacco Use        | Types | Packs/Day | Years  | Date |
|                    |       |           | Used   |      |
+--------------------+-------+-----------+--------+------+
| Current Every Day  |       | 1         |        |      |
| Smoker             |       |           |        |      |
+--------------------+-------+-----------+--------+------+
 
 
 
 
+---------------------+---+---+---+
| Smokeless Tobacco:  |   |   |   |
| Never Used          |   |   |   |
+---------------------+---+---+---+
 
 
 
+-------------+-------------+---------+----------+
| Alcohol Use | Drinks/Week | oz/Week | Comments |
+-------------+-------------+---------+----------+
| Not Asked   |             |         |          |
+-------------+-------------+---------+----------+
 
 
 
+------------------+---------------+
| Sex Assigned at  | Date Recorded |
| Birth            |               |
+------------------+---------------+
| Not on file      |               |
+------------------+---------------+
 documented as of this encounter
 
 Medications at Time of Discharge
 
 
+----------------------+----------------------+-----------+---------+----------+-----------+
| Medication           | Sig                  | Dispensed | Refills | Start    | End Date  |
|                      |                      |           |         | Date     |           |
+----------------------+----------------------+-----------+---------+----------+-----------+
|   ibuprofen (ADVIL,  | Take 400 mg by mouth |           | 0       |          |           |
| MOTRIN) 200 mg       |  every 6 hours as    |           |         |          |           |
| tablet               | needed for Pain.     |           |         |          |           |
+----------------------+----------------------+-----------+---------+----------+-----------+
|   albuterol          | Inhale 2 puffs into  |   1       | 3       | 20 |  |
| (VENTOLIN HFA) 90    | the lungs every 4    | Inhaler   |         | 15       | 6         |
| mcg/puff             | hours as needed for  |           |         |          |           |
| inhalerIndications:  | Wheezing.            |           |         |          |           |
| Asthma, mild         |                      |           |         |          |           |
| intermittent,        |                      |           |         |          |           |
| uncomplicated        |                      |           |         |          |           |
+----------------------+----------------------+-----------+---------+----------+-----------+
|   albuterol 2.5 mg/3 | Use one vial in your |   60 vial | 0       | 20 | 10/08/201 |
|  mL nebulizer        |  nebulizer every 4   |           |         | 15       | 6         |
| solutionIndications: | hours as needed for  |           |         |          |           |
|  Asthma, mild        | wheezing             |           |         |          |           |
| intermittent,        |                      |           |         |          |           |
| uncomplicated        |                      |           |         |          |           |
+----------------------+----------------------+-----------+---------+----------+-----------+
|   meloxicam (MOBIC)  | Take 1 tablet by     |   30      | 0       | 20 | 10/08/201 |
| 15 mg                | mouth Daily as       | tablet    |         | 16       | 6         |
| tabletIndications:   | needed for Pain.     |           |         |          |           |
| Right-sided low back |                      |           |         |          |           |
|  pain with           |                      |           |         |          |           |
| right-sided          |                      |           |         |          |           |
| sciatica,            |                      |           |         |          |           |
| unspecified          |                      |           |         |          |           |
| chronicity           |                      |           |         |          |           |
 
+----------------------+----------------------+-----------+---------+----------+-----------+
|                      | Take  by mouth.      |           | 0       |          | 10/08/201 |
| Nmptoooyq-OZI-IO-APA |                      |           |         |          | 6         |
| P (DIMETAPP          |                      |           |         |          |           |
| MULTISYMPTOM         |                      |           |         |          |           |
| COLD/FLU PO)         |                      |           |         |          |           |
+----------------------+----------------------+-----------+---------+----------+-----------+
|   traMADol (ULTRAM)  | One tablet orally    |   30      | 0       | 20 | 10/08/201 |
| 50 mg                | every 4 hours as     | tablet    |         | 16       | 6         |
| tabletIndications:   | needed for pain      |           |         |          |           |
| Low back pain,       |                      |           |         |          |           |
| unspecified back     |                      |           |         |          |           |
| pain laterality,     |                      |           |         |          |           |
| unspecified          |                      |           |         |          |           |
| chronicity, with     |                      |           |         |          |           |
| sciatica presence    |                      |           |         |          |           |
| unspecified          |                      |           |         |          |           |
+----------------------+----------------------+-----------+---------+----------+-----------+
 documented as of this encounter
 
 Plan of Treatment
 Not on filedocumented as of this encounter
 
 Procedures
 
 
+----------------------+--------+-------------+----------------------+----------------------
+
| Procedure Name       | Priori | Date/Time   | Associated Diagnosis | Comments             
|
|                      | ty     |             |                      |                      
|
+----------------------+--------+-------------+----------------------+----------------------
+
| MRI LUMBAR SPINE WO  | Routin | 2016  |   Right-sided low    |   Results for this   
|
| CONTRAST             | e      | 11:58 AM    | back pain with       | procedure are in the 
|
|                      |        | PDT         | right-sided          |  results section.    
|
|                      |        |             | sciatica,            |                      
|
|                      |        |             | unspecified          |                      
|
|                      |        |             | chronicity           |                      
|
+----------------------+--------+-------------+----------------------+----------------------
+
 documented in this encounter
 
 Results
 MRI Lumbar Spine wo Contrast (2016 11:58 AM PDT)
 
+----------+
| Specimen |
+----------+
|          |
+----------+
 
 
 
 
+------------------------------------------------------------------------+-----------------+
| Narrative                                                              | Performed At    |
+------------------------------------------------------------------------+-----------------+
|   EXAM: MRI LUMBAR SPINE WO CONTRAST dated 2016 10:00 AM          |   [facility]    |
| HISTORY:2 months of low back pain with radiation into R leg            | [facility]        |
| COMPARISON:   2016 radiographs.     TECHNIQUE: Multiplanar   | [facility]  |
| multisequence MR imaging of the lumbar spine without  contrast.   This | - IMAGING       |
|  is performed on a 3Tesla MRI scanner.      FINDINGS:There are 5       |                 |
| lumbar-type vertebral bodies.   This is either assumed for  counting   |                 |
| purposes or documented on prior studies.   No scoliosis.   No          |                 |
| significant  spondylolisthesis.   There is mild disc desiccation and   |                 |
| narrowing at L4-L5.   There are no significant endplate degenerative   |                 |
| changes.   The vertebral body  height and signal is normal in all      |                 |
| levels.   The conus terminates at the L1  level.   The visible distal  |                 |
| cord is unremarkable.   The terminal ventricle is  visible.     The    |                 |
| following levels are evaluated in the axial plane:     T12-L1: No      |                 |
| significant central stenosis or neural foraminal narrowing.     L1-2:  |                 |
| No significant central stenosis or neural foraminal narrowing.         |                 |
| L2-3: No significant central stenosis or neural foraminal narrowing.   |                 |
|    L3-4: No significant central stenosis or neural foraminal           |                 |
| narrowing.      L4-5: No significant central stenosis or neural        |                 |
| foraminal narrowing.      L5-S1: There is a small right eccentric disc |                 |
|  protrusion which mildly narrows the  right subarticular recess.       |                 |
|   There is mild right neural foraminal narrowing.      There is a      |                 |
| partially visualized large heterogeneous mass emanating from the top   |                 |
| of the uterus.   This measures approximately 17 cm in craniocaudal     |                 |
| dimension.     IMPRESSION -      Mild lumbar spondylosis at L4-L5.     |                 |
|  17 cm heterogeneous mass emanating from fundus of the uterus.   This  |                 |
| most common  would represent a fibroid.     Dictated and Signed by:    |                 |
| Homer Vazquez MD    Electronically signed: 2016 1:19 PM        |                 |
+------------------------------------------------------------------------+-----------------+
 
 
 
+------------------------------------------------------------------------------------------+
| Procedure Note                                                                           |
+------------------------------------------------------------------------------------------+
|   [place], Rad Results In - 2016  1:22 PM PDT  EXAM: MRI LUMBAR SPINE WO CONTRAST      |
| dated 2016 10:00 AMHISTORY:2 months of low back pain with radiation into R          |
| legCOMPARISON:  2016 radiographs.TECHNIQUE: Multiplanar multisequence MR       |
| imaging of the lumbar spine withoutcontrast.  This is performed on a 3Tesla MRI scanner. |
|  FINDINGS:There are 5 lumbar-type vertebral bodies.  This is either assumed forcounting  |
| purposes or documented on prior studies.  No scoliosis.  No                              |
| significantspondylolisthesis.  There is mild disc desiccation and narrowing at L4-L5.    |
| There are no significant endplate degenerative changes.  The vertebral bodyheight and    |
| signal is normal in all levels.  The conus terminates at the [id number].  The visible       |
| distal cord is unremarkable.  The terminal ventricle isvisible.The following levels are  |
| evaluated in the axial plane:T12-L1: No significant central stenosis or neural foraminal |
|  narrowing.L1-2: No significant central stenosis or neural foraminal narrowing.L2-3: No  |
| significant central stenosis or neural foraminal narrowing.L3-4: No significant central  |
| stenosis or neural foraminal narrowing. L4-5: No significant central stenosis or neural  |
| foraminal narrowing. L5-S1: There is a small right eccentric disc protrusion which       |
| mildly narrows theright subarticular recess.  There is mild right neural foraminal       |
| narrowing. There is a partially visualized large heterogeneous mass emanating from the   |
| topof the uterus.  This measures approximately 17 cm in craniocaudal                     |
| dimension.IMPRESSION - Mild lumbar spondylosis at L4-L5.17 cm heterogeneous mass         |
| emanating from fundus of the uterus.  This most commonwould represent a fibroid.Dictated |
|  and Signed by: Homer Vazquez MD  Electronically signed: 2016 1:19 PM            |
|L1-2: No significant central stenosis or neural foraminal narrowing.                      |
 
|                                                                                          |
|L2-3: No significant central stenosis or neural foraminal narrowing.                      |
|                                                                                          |
|L3-4: No significant central stenosis or neural foraminal narrowing.                      |
|                                                                                          |
|L4-5: No significant central stenosis or neural foraminal narrowing.                      |
|                                                                                          |
|L5-S1: There is a small right eccentric disc protrusion which mildly narrows the          |
|right subarticular recess.  There is mild right neural foraminal narrowing.               |
|                                                                                          |
|There is a partially visualized large heterogeneous mass emanating from the top           |
|of the uterus.  This measures approximately 17 cm in craniocaudal dimension.              |
|                                                                                          |
|IMPRESSION -                                                                              |
|                                                                                          |
|Mild lumbar spondylosis at L4-L5.                                                         |
|                                                                                          |
|17 cm heterogeneous mass emanating from fundus of the uterus.  This most common           |
|would represent a fibroid.                                                                |
|                                                                                          |
|Dictated and Signed by: Homer Vazquez MD                                               |
| Electronically signed: 2016 1:19 PM                                                 |
+------------------------------------------------------------------------------------------+
 
 
 
+----------------------+---------------------+--------------------+----------------+
| Performing           | Address             | City/State/Zipcode | Phone Number   |
| Organization         |                     |                    |                |
+----------------------+---------------------+--------------------+----------------+
|   [facility]     |   [address] |   [place]  |   [phone] |
| [facility]  |                     | [address]              |                |
| - IMAGING            |                     |                    |                |
+----------------------+---------------------+--------------------+----------------+
 documented in this encounter
 
 Visit Diagnoses
 Not on filedocumented in this encounter

## 2020-09-28 NOTE — XMS
Encounter Summary
  Created on: 2020
 
 Marie eFrmin
 External Reference #: [id number]
 : [birth year]
 Sex: Female
 
 Demographics
 
 
+-----------------------+------------------------+
| Address               | [address]          |
|                       | [place]  [address]      |
+-----------------------+------------------------+
| Home Phone            | [phone]        |
+-----------------------+------------------------+
| Preferred Language    | Unknown                |
+-----------------------+------------------------+
| Marital Status        |                 |
+-----------------------+------------------------+
| Denominational Affiliation | Unknown                |
+-----------------------+------------------------+
| Race                  | White                  |
+-----------------------+------------------------+
| Ethnic Group          | Not  or  |
+-----------------------+------------------------+
 
 
 Author
 
 
+--------------+--------------------------------------------+
| Author       | [facility]  |
|              | and [place]                                |
+--------------+--------------------------------------------+
| Organization | [facility]  |
|              | and [place]                                |
+--------------+--------------------------------------------+
| Address      | Unknown                                    |
+--------------+--------------------------------------------+
| Phone        | Unavailable                                |
+--------------+--------------------------------------------+
 
 
 
 Support
 
 
+-----------------+--------------+---------+-----------------+
| Name            | Relationship | Address | Phone           |
+-----------------+--------------+---------+-----------------+
| Rodríguez Fermin | ECON         | Unknown | [phone] |
+-----------------+--------------+---------+-----------------+
 
 
 
 Care Team Providers
 
 
 
+-----------------------+------+-------------+
| Care Team Member Name | Role | Phone       |
+-----------------------+------+-------------+
| No, Physician         | PCP  | Unavailable |
+-----------------------+------+-------------+
 
 
 
 Reason for Visit
 
 
+---------+--------+----------+
| Reason  | Onset  | Comments |
|         | Date   |          |
+---------+--------+----------+
| Results | / |          |
|         |    |          |
+---------+--------+----------+
 
 
 
 Encounter Details
 
 
+--------+-----------+----------------------+---------------------+-------------+
| Date   | Type      | Department           | Care Team           | Description |
+--------+-----------+----------------------+---------------------+-------------+
| / | Telephone |   [facility]   |   Yary Jeff,  | Results     |
| 2016   |           | CARE  [address] | MD  Need updated    |             |
|        |           |   [place], [place]    | address             |             |
|        |           | [address]           |                     |             |
|        |           | [phone]         |                     |             |
+--------+-----------+----------------------+---------------------+-------------+
 
 
 
 Social History
 
 
+--------------------+-------+-----------+--------+------+
| Tobacco Use        | Types | Packs/Day | Years  | Date |
|                    |       |           | Used   |      |
+--------------------+-------+-----------+--------+------+
| Current Every Day  |       | 1         |        |      |
| Smoker             |       |           |        |      |
+--------------------+-------+-----------+--------+------+
 
 
 
+---------------------+---+---+---+
| Smokeless Tobacco:  |   |   |   |
| Never Used          |   |   |   |
+---------------------+---+---+---+
 
 
 
+-------------+-------------+---------+----------+
| Alcohol Use | Drinks/Week | oz/Week | Comments |
 
+-------------+-------------+---------+----------+
| Not Asked   |             |         |          |
+-------------+-------------+---------+----------+
 
 
 
+------------------+---------------+
| Sex Assigned at  | Date Recorded |
| Birth            |               |
+------------------+---------------+
| Not on file      |               |
+------------------+---------------+
 documented as of this encounter
 
 Miscellaneous Notes
 Telephone Encounter - Yary Jeff MD - 2016 11:27 AM PDTPt came in  and Dr. Pearson reviewed results with patient.  She was referred to women's clinic in [facility] fo
r uterine mass and she already had appt with Dr. Salazar for the back pain.  I reviewed t
his note from .  I was on vacation the week results came back.  Called to speak to pt.  
No answer.  I did not leave msgElectronically signed by Yary Jeff MD at 2016 11:
31 AM PDTdocumented in this encounter
 
 Plan of Treatment
 Not on filedocumented as of this encounter
 
 Visit Diagnoses
 Not on filedocumented in this encounter

## 2020-09-28 NOTE — XMS
Encounter Summary
  Created on: 2020
 
 Marie Fermin
 External Reference #: [id number]
 : [birth year]
 Sex: Female
 
 Demographics
 
 
+-----------------------+------------------------+
| Address               | [address]          |
|                       | [place]  [address]      |
+-----------------------+------------------------+
| Home Phone            | [phone]        |
+-----------------------+------------------------+
| Preferred Language    | Unknown                |
+-----------------------+------------------------+
| Marital Status        |                 |
+-----------------------+------------------------+
| Sikhism Affiliation | Unknown                |
+-----------------------+------------------------+
| Race                  | White                  |
+-----------------------+------------------------+
| Ethnic Group          | Not  or  |
+-----------------------+------------------------+
 
 
 Author
 
 
+--------------+--------------------------------------------+
| Author       | [facility]  |
|              | and [place]                                |
+--------------+--------------------------------------------+
| Organization | [facility]  |
|              | and [place]                                |
+--------------+--------------------------------------------+
| Address      | Unknown                                    |
+--------------+--------------------------------------------+
| Phone        | Unavailable                                |
+--------------+--------------------------------------------+
 
 
 
 Support
 
 
+-----------------+--------------+---------+-----------------+
| Name            | Relationship | Address | Phone           |
+-----------------+--------------+---------+-----------------+
| Rodríguez Fermin | ECON         | Unknown | [phone] |
+-----------------+--------------+---------+-----------------+
 
 
 
 Care Team Providers
 
 
 
+-----------------------+------+-------------+
| Care Team Member Name | Role | Phone       |
+-----------------------+------+-------------+
| No, Physician         | PCP  | Unavailable |
+-----------------------+------+-------------+
 
 
 
 Reason for Visit
 
 
+--------------+--------+----------+
| Reason       | Onset  | Comments |
|              | Date   |          |
+--------------+--------+----------+
| Imaging Only | / |          |
|              |    |          |
+--------------+--------+----------+
 
 
 
 Encounter Details
 
 
+--------+-----------+----------------------+---------------------+--------------+
| Date   | Type      | Department           | Care Team           | Description  |
+--------+-----------+----------------------+---------------------+--------------+
| / | Telephone |   [facility]   |   Yary Jeff,  | Imaging Only |
| 2016   |           | CARE  [address] | MD  Need updated    |              |
|        |           |   [place]    | address             |              |
|        |           | [address]           |                     |              |
|        |           | [phone]         |                     |              |
+--------+-----------+----------------------+---------------------+--------------+
 
 
 
 Social History
 
 
+--------------------+-------+-----------+--------+------+
| Tobacco Use        | Types | Packs/Day | Years  | Date |
|                    |       |           | Used   |      |
+--------------------+-------+-----------+--------+------+
| Current Every Day  |       | 1         |        |      |
| Smoker             |       |           |        |      |
+--------------------+-------+-----------+--------+------+
 
 
 
+---------------------+---+---+---+
| Smokeless Tobacco:  |   |   |   |
| Never Used          |   |   |   |
+---------------------+---+---+---+
 
 
 
+-------------+-------------+---------+----------+
| Alcohol Use | Drinks/Week | oz/Week | Comments |
 
+-------------+-------------+---------+----------+
| Not Asked   |             |         |          |
+-------------+-------------+---------+----------+
 
 
 
+------------------+---------------+
| Sex Assigned at  | Date Recorded |
| Birth            |               |
+------------------+---------------+
| Not on file      |               |
+------------------+---------------+
 documented as of this encounter
 
 Miscellaneous Notes
 Telephone Encounter - Karina Enriquez Cert MA - 2016  2:34 PM PDTPatient called back a
nd was notified and will call and schedule.
 Electronically signed by Lakeisha Keith MA at 2016  2:36 PM PDTTelephone Encounjuanito
r - Karina Enriquez Cert MA - 2016 11:56 AM PDTCalled patient and left message for her 
to call back, need to let her knoe that her MRI was approved and she may call [phone] and s
adela her appointment.
 Electronically signed by Lakeisha Keith MA at 2016 11:57 AM PDTdocumented in this
 encounter
 
 Plan of Treatment
 Not on filedocumented as of this encounter
 
 Visit Diagnoses
 Not on filedocumented in this encounter

## 2020-09-28 NOTE — XMS
Encounter Summary
  Created on: 2020
 
 Marie Fermin
 External Reference #: [id number]
 : [birth year]
 Sex: Female
 
 Demographics
 
 
+-----------------------+------------------------+
| Address               | [address]          |
|                       | [place]  [address]      |
+-----------------------+------------------------+
| Home Phone            | [phone]        |
+-----------------------+------------------------+
| Preferred Language    | Unknown                |
+-----------------------+------------------------+
| Marital Status        |                 |
+-----------------------+------------------------+
| Hoahaoism Affiliation | Unknown                |
+-----------------------+------------------------+
| Race                  | White                  |
+-----------------------+------------------------+
| Ethnic Group          | Not  or  |
+-----------------------+------------------------+
 
 
 Author
 
 
+--------------+--------------------------------------------+
| Author       | [facility]  |
|              | and [place]                                |
+--------------+--------------------------------------------+
| Organization | [facility]  |
|              | and [place]                                |
+--------------+--------------------------------------------+
| Address      | Unknown                                    |
+--------------+--------------------------------------------+
| Phone        | Unavailable                                |
+--------------+--------------------------------------------+
 
 
 
 Support
 
 
+-----------------+--------------+---------+-----------------+
| Name            | Relationship | Address | Phone           |
+-----------------+--------------+---------+-----------------+
| Rodríguez Fermin | ECON         | Unknown | [phone] |
+-----------------+--------------+---------+-----------------+
 
 
 
 Care Team Providers
 
 
 
+-----------------------+------+-------------+
| Care Team Member Name | Role | Phone       |
+-----------------------+------+-------------+
| No, Physician         | PCP  | Unavailable |
+-----------------------+------+-------------+
 
 
 
 Reason for Visit
 
 
+--------+----------------------+
| Reason | Comments             |
+--------+----------------------+
| Cough  | Rm 2/ Cough x 5 days |
+--------+----------------------+
 
 
 
 Encounter Details
 
 
+--------+---------+----------------------+----------------------+----------------------+
| Date   | Type    | Department           | Care Team            | Description          |
+--------+---------+----------------------+----------------------+----------------------+
| / | Office  |   [facility]   |   Homer Garrison,  | [place] (upper           |
|    | Visit   | CARE  [address] | MD  [address]   | respiratory          |
|        |         |   [place], [place]    | [place]      | infection) (Primary  |
|        |         | [address]           | [address]  [phone]  | Dx)                  |
|        |         | [phone]         |  [phone] (Fax)  |                      |
+--------+---------+----------------------+----------------------+----------------------+
 
 
 
 Social History
 
 
+--------------------+-------+-----------+--------+------+
| Tobacco Use        | Types | Packs/Day | Years  | Date |
|                    |       |           | Used   |      |
+--------------------+-------+-----------+--------+------+
| Current Every Day  |       | 1         |        |      |
| Smoker             |       |           |        |      |
+--------------------+-------+-----------+--------+------+
 
 
 
+---------------------+---+---+---+
| Smokeless Tobacco:  |   |   |   |
| Never Used          |   |   |   |
+---------------------+---+---+---+
 
 
 
+-------------+-------------+---------+----------+
| Alcohol Use | Drinks/Week | oz/Week | Comments |
+-------------+-------------+---------+----------+
| Not Asked   |             |         |          |
 
+-------------+-------------+---------+----------+
 
 
 
+------------------+---------------+
| Sex Assigned at  | Date Recorded |
| Birth            |               |
+------------------+---------------+
| Not on file      |               |
+------------------+---------------+
 documented as of this encounter
 
 Last Filed Vital Signs
 
 
+-------------------+--------------------+----------------------+----------+
| Vital Sign        | Reading            | Time Taken           | Comments |
+-------------------+--------------------+----------------------+----------+
| Blood Pressure    | 110/78             | 2015  8:10 AM  |          |
|                   |                    | PST                  |          |
+-------------------+--------------------+----------------------+----------+
| Pulse             | 105                | 2015  8:10 AM  |          |
|                   |                    | PST                  |          |
+-------------------+--------------------+----------------------+----------+
| Temperature       | 37.8   C (100   F) | 2015  8:10 AM  |          |
|                   |                    | PST                  |          |
+-------------------+--------------------+----------------------+----------+
| Respiratory Rate  | 18                 | 2015  8:10 AM  |          |
|                   |                    | PST                  |          |
+-------------------+--------------------+----------------------+----------+
| Oxygen Saturation | 98%                | 2015  8:10 AM  |          |
|                   |                    | PST                  |          |
+-------------------+--------------------+----------------------+----------+
| Inhaled Oxygen    | -                  | -                    |          |
| Concentration     |                    |                      |          |
+-------------------+--------------------+----------------------+----------+
| Weight            | 106.6 kg (235 lb)  | 2015  8:10 AM  |          |
|                   |                    | PST                  |          |
+-------------------+--------------------+----------------------+----------+
| Height            | 162.6 cm (5' 4")   | 2015  8:10 AM  |          |
|                   |                    | PST                  |          |
+-------------------+--------------------+----------------------+----------+
| Body Mass Index   | 40.34              | 2015  8:10 AM  |          |
|                   |                    | PST                  |          |
+-------------------+--------------------+----------------------+----------+
 documented in this encounter
 
 Patient Instructions
 Patient Instructions Homer Garrison MD - 2015  8:40 AM PSTGet plenty of rest and w
ater.  Take the cough syrup as directed.Don't do anything you need to be alert to do for 6 h
ours after taking a narcotic, such as driving, running machinery, swimming, etc. 
 Viral Respiratory Illness [Adult]
 You have an Upper Respiratory Illness (URI) caused by a virus. This illness is contagious d
uring the first few days. It is spread through the air by coughing and sneezing or by direct
 contact (touching the sick person and then touching your own eyes, nose or mouth). Most vir
al illnesses go away within 7-10 days with rest and simple home remedies. Sometimes, the ill
ness may last for several weeks. Antibiotics will not kill a virus and are generally not pre
scribed for this condition.
 
 Home Care:
 
 1) If symptoms are severe, rest at home for the first 2-3 days. When you resume activity, d
on't let yourself get too tired.
 2) Avoid being exposed to cigarette smoke (yours or others  ).
 3) Tylenol (acetaminophen) or ibuprofen (Advil, Motrin) will help fever, muscle aching and 
headache. (Persons under 18 with fever should not take aspirin since this may cause liver da
mage.)
 4) Your appetite may be poor, so a light diet is fine. Avoid dehydration by drinking 6-8 gl
asses of fluids per day (water, soft drinks, juices, tea, soup). Extra fluids will help loos
en secretions in the nose and lungs.
 5) Over-the-counter cold medicines will not shorten the length of time you  re sick, but t
hey may be helpful for the following symptoms: cough (Robitussin DM); sore throat (Chlorasep
tic lozenges or spray); nasal and sinus congestion (Actifed, Sudafed, Chlortrimeton).
 Follow Up
 with your doctor or as advised if you don  t improve over the next week.
 Get Prompt Medical Attention
 if any of the following occur:
 -- Cough with lots of colored sputum (mucus) or blood in your sputum
 -- Chest pain, shortness of breath, wheezing or have trouble breathing
 -- Severe headache; face, neck or ear pain
 -- Fever over 100.4 F (38.0 C) for more than three days
 -- You can  t swallow due to throat pain
  [id number] [employer]. [address] [place] [address]. All righ
ts reserved. This information is not intended as a substitute for professional medical care.
 Always follow your healthcare professional's instructions.
 
 Electronically signed by Homer Garrison MD at 2015  8:40 AM PST
 documented in this encounter
 
 Progress Notes
 Homer Garrison MD - 2015  8:40 AM PSTFormatting of this note might be different fr
om the original.
 Subjective: 
  
 Patient ID: Marie Fermin is a 39 y.o. female.
 
 HPI
 Patient's medications, allergies, past medical, surgical, social and family histories were 
reviewed and updated as appropriate.
 This lady came to the clinic because of for 5 days of congestion and cough.  She is a smoke
r.  She is not coughing much phlegm up.  She has not had a fever.  She does not have a sore 
throat.  Her ears do not hurt.  And she denies sinus pain.  Basically it's a fairly dry coug
h that is making it hard for her to sleep.  She's had no nausea or vomiting or other complai
nt.  I reviewed her past history and meds.
 Review of Systems 
 Constitutional: Negative.  
 HENT: Positive for congestion.  
 Respiratory: Positive for cough.  
 Cardiovascular: Negative.  
 Gastrointestinal: Negative.  
 
 Objective: 
 Physical Exam 
 Vitals reviewed.
 Constitutional: She is oriented to person, place, and time. She appears well-developed and 
well-nourished. No distress. 
 HENT: 
 Head: Normocephalic. 
 Right Ear: External ear normal. 
 Left Ear: External ear normal. 
 Mouth/Throat: Oropharynx is clear and moist. 
 
      Mild nasal congestion.  No tenderness with pressure over the sinuses. 
 Eyes: Conjunctivae normal are normal. Pupils are equal, round, and reactive to light. 
 Neck: Normal range of motion. Neck supple. 
 Cardiovascular: Normal rate, regular rhythm and normal heart sounds.  
 Pulmonary/Chest: Effort normal and breath sounds normal. 
      Occasional cough 
 Musculoskeletal: Normal range of motion. 
 Lymphadenopathy: 
   She has no cervical adenopathy. 
 Neurological: She is alert and oriented to person, place, and time. 
 
 Assessment: 
 
 1. URI (upper respiratory infection)  
 
 Plan: 
 
 Please see the AVS for the plan unless outlined elsewhere.
 
 Have explained to her that I think this is a simple viral URI.  I will give her some Hycoda
n cough syrup to use at night.  I've instructed her in its use.
 
 Electronically signed by Homer Garrison MD at 2015  8:50 AM PSTdocumented in this e
ncounter
 
 Plan of Treatment
 Not on filedocumented as of this encounter
 
 Visit Diagnoses
 
 
+---------------------------------------------------------------------------------------+
| Diagnosis                                                                             |
+---------------------------------------------------------------------------------------+
|   URI (upper respiratory infection) - Primary  Acute upper respiratory infections of  |
| unspecified site                                                                      |
+---------------------------------------------------------------------------------------+
 documented in this encounter

## 2020-09-29 NOTE — EKG
[facility]
                                    [address]
                                  [place], [place]  [address]
_________________________________________________________________________________________
                                                                 Signed   
 
 
Sinus tachycardia
Otherwise normal ECG
No previous ECGs available
Confirmed by PIPPA DELEON DO ([id number]) on 9/29/2020 3:43:51 PM
 
 
 
 
 
 
 
 
 
 
 
 
 
 
 
 
 
 
 
 
 
 
 
 
 
 
 
 
 
 
 
 
 
 
 
 
 
 
 
 
 
    Electronically Signed By: PIPPA DELEON DO  09/29/20 1544
_________________________________________________________________________________________
PATIENT NAME:     RASHI PEREZ                     
MEDICAL RECORD #: [id number]                     Electrocardiogram             
          ACCT #: [id number]  
DATE OF BIRTH:   [birth year]                                       
PHYSICIAN:   PIPPA DELEON DO                     REPORT #: [id number]
REPORT IS CONFIDENTIAL AND NOT TO BE RELEASED WITHOUT AUTHORIZATION